# Patient Record
Sex: MALE | Race: WHITE | Employment: OTHER | URBAN - METROPOLITAN AREA
[De-identification: names, ages, dates, MRNs, and addresses within clinical notes are randomized per-mention and may not be internally consistent; named-entity substitution may affect disease eponyms.]

---

## 2014-09-03 LAB — LEFT VENTRICULAR EJECTION FRACTION, EXTERNAL: 40

## 2017-01-06 ENCOUNTER — OFFICE VISIT (OUTPATIENT)
Dept: SURGERY | Age: 62
End: 2017-01-06

## 2017-01-06 VITALS
WEIGHT: 186 LBS | OXYGEN SATURATION: 96 % | RESPIRATION RATE: 17 BRPM | BODY MASS INDEX: 31.76 KG/M2 | HEIGHT: 64 IN | HEART RATE: 82 BPM | TEMPERATURE: 98.5 F

## 2017-01-06 DIAGNOSIS — Z12.11 COLON CANCER SCREENING: ICD-10-CM

## 2017-01-06 DIAGNOSIS — Z01.818 PRE-OP TESTING: Primary | ICD-10-CM

## 2017-01-06 NOTE — PROGRESS NOTES
Review of Systems   Constitutional: Negative. HENT: Positive for tinnitus. Negative for congestion, ear discharge, ear pain, hearing loss, nosebleeds and sore throat. Eyes: Negative. Respiratory: Positive for cough and shortness of breath. Negative for hemoptysis, sputum production, wheezing and stridor. On excertion   Cardiovascular: Negative. Gastrointestinal: Positive for abdominal pain. Negative for blood in stool, constipation, diarrhea, heartburn, melena, nausea and vomiting. Right side abd pain occas. Genitourinary: Negative. Musculoskeletal: Negative. Skin: Positive for rash. Negative for itching. After shaving face   Neurological: Positive for dizziness. Negative for tingling, tremors, sensory change, speech change, focal weakness, seizures, loss of consciousness and headaches. Vertigo   Endo/Heme/Allergies: Negative for environmental allergies and polydipsia. Bruises/bleeds easily. Psychiatric/Behavioral: Positive for memory loss. Negative for depression, hallucinations, substance abuse and suicidal ideas. The patient is not nervous/anxious and does not have insomnia. Sleep apnea affects memory          Colon Screen    Patient: Narendra March MRN: [id number]  SSN: [id number]    YOB: 1955  Age: 64 y.o. Sex: male        Subjective:   Narendra March was referred by his PCP, Vinh Miller MD.  Patient referred for colonoscopy for   Screening colonoscopy. Patient denies rectal pain or bleeding. Abdominal surgeries as described below, specifically none. Family history as described below, specifically none. Patient has never had a colonoscopy.     Allergies   Allergen Reactions    Atorvastatin Myalgia     High dose    Peanut Diarrhea and Nausea and Vomiting    Acetaminophen Other (comments)     hallucinations    Dimethicone Rash    Percocet [Oxycodone-Acetaminophen] Other (comments)     hallucinations       Past Medical History Diagnosis Date    BPPV (benign paroxysmal positional vertigo) 5/26/2010    CAD (coronary artery disease), native coronary artery      cath (jan 2014): 40% LM, pLAD 85%, dLAD 70%, dLCx 80%, mRCA 95%. s/p CABG x 4 (LIMA->D, SVG->dLAD, Seq SVG -> RPDA & OM2)    Carotid duplex 01/13/2014     Mod 50-69% VALENTIN stenosis. Mild 5-16% LICA stenosis. Occluded right vertebral.       Carpal tunnel syndrome     Cerebrovascular disease      Moderate VALENTIN, mild LICA, occluded R vertebral (Doppler Jan 2014)    Chest pain, musculoskeletal      post CABG    Diabetes ([facility]     Dyslipidemia     H/O tobacco use, presenting hazards to health      quit 2014    Headache(784.0)      cluster    Heart attack ([facility]     Hiatal hernia     History of echocardiogram 09/03/2014     Tech difficult. EF 40-45%. Mild, diffuse hypk. Mild LVH. Indeterminate diastolic fx. Mild LAE. No significant valvular heart disease.  Hypercholesterolemia     Hypertension     Hypertensive heart disease with congestive heart failure ([address]     Internal carotid artery stenosis 5/26/2010    Obesity (BMI 30-39. 9)     S/P CABG x 4     S/P cardiac catheterization 01/10/2014     pLM 40%. pLAD 85%. dCX 80%. mRCA 95%. CABG recommended.  Sleep apnea     Stroke ([facility] 5/22/09     TIA    Systolic HF (heart failure) ([facility])      LV EF 35% (echo Jan 2014). Mean LA pressure 32 (Jan 2014)    Vertigo      Past Surgical History   Procedure Laterality Date    Hx urological       repair of spermatocele    Hx coronary artery bypass graft  1/14/14     LIMA to the D1, and GSV to the distal LAD, and anotherGSV sequentially to the PDA of the RCA and to theOM2 and ascending aortic endarterectomy, Dr. Ra Marroquin, 1/14/14.     Hx coronary stent placement        Family History   Problem Relation Age of Onset    Cancer Father      prostate    Heart Disease Father     Heart Disease Brother      age 48    Other Son      Brain damage    Heart Attack Brother Social History   Substance Use Topics    Smoking status: Former Smoker     Packs/day: 0.25     Years: 10.00     Types: Cigarettes     Quit date: 1/9/2014    Smokeless tobacco: Never Used      Comment:  less then 5 ciggs. a week    Alcohol use No      Prior to Admission medications    Medication Sig Start Date End Date Taking? Authorizing Provider   lovastatin (MEVACOR) 10 mg tablet TAKE ONE TABLET BY MOUTH IN THE EVENING 12/19/16  Yes Binh Garces NP   furosemide (LASIX) 40 mg tablet TAKE ONE TABLET BY MOUTH ONCE DAILY 9/26/16  Yes Binh Garces NP   glyBURIDE (DIABETA) 2.5 mg tablet TAKE ONE TABLET BY MOUTH ONCE DAILY BEFORE BREAKFAST 9/26/16  Yes Marlena Johnson MD   lisinopril (PRINIVIL, ZESTRIL) 5 mg tablet TAKE ONE TABLET BY MOUTH ONCE DAILY 6/16/16  Yes Julio ADAMS MD   carvedilol (COREG) 12.5 mg tablet TAKE ONE TABLET BY MOUTH TWICE DAILY WITH MEALS 5/4/16  Yes Julio ADAMS MD   KLOR-CON M10 10 mEq tablet TAKE ONE TABLET BY MOUTH ONCE DAILY 5/4/16  Yes Julio ADAMS MD   clopidogrel (PLAVIX) 75 mg tablet TAKE ONE TABLET BY MOUTH ONCE DAILY 3/14/16  Yes Julio ADAMS MD   lovastatin (MEVACOR) 10 mg tablet TAKE ONE TABLET BY MOUTH IN THE EVENING 11/12/15  Yes Julio ADAMS MD   cpap machine kit by Does Not Apply route. Overnight CPAP at 16 CWP with ramp and humidifier. Mask: Simplus FF small or mask of choice. Supplies 99 month. Please send compliance data [id number]. Diagnosis DAYANA. AHI 52 RDI 52 4/16/15  Yes Jose Zuleta MD   aspirin 81 mg chewable tablet Take 1 Tab by mouth daily. 1/23/14  Yes Akira Cheema PA-C   nitroglycerin (NITROSTAT) 0.4 mg SL tablet 1 Tab by SubLINGual route every five (5) minutes as needed for Chest Pain. 9/15/16   Marlena Johnson MD          Review of Systems:      Risks colonoscopy described- colon injury, missed lesion, anesthesia problems, bleeding       Lisa Ambrosio, WOODN  [birth year]73  9:01 AM

## 2017-01-06 NOTE — MR AVS SNAPSHOT
Visit Information Date & Time Provider Department Dept. Phone Encounter #  
 1/6/2017  9:00 AM TSS [facility] NURSE VISIT [facility] [phone] [id number] Your Appointments 1/6/2017  9:00 AM  
COLON SCREEN with TSS [facility] NURSE VISIT Melvin Coronado ([facility]) Appt Note: Dr Mel Aguero referring [address] [facility] [address] [facility] [address] [place] [address]  
  
    
 2/15/2017  8:20 AM  
Follow Up with José Luis Wiseman MD  
[facility] ([facility]) Appt Note: 1 year with an ekg; former Dr. Stas Rosas patient [place]l[facility] [place] [id number] [phone] [address] [place] [address] [address] Upcoming Health Maintenance Date Due Hepatitis C Screening 1955 FOOT EXAM Q1 12/10/1965 EYE EXAM RETINAL OR DILATED Q1 12/10/1965 COLONOSCOPY 12/10/1973 DTaP/Tdap/Td series (1 - Tdap) 12/10/1976 ZOSTER VACCINE AGE 60> 12/10/2015 HEMOGLOBIN A1C Q6M 3/13/2017 MICROALBUMIN Q1 9/13/2017 LIPID PANEL Q1 9/13/2017 Allergies as of 1/6/2017  Review Complete On: 1/6/2017 By: Nicolle [place]. Early, LPN Severity Noted Reaction Type Reactions Atorvastatin Medium 11/26/2014   Side Effect Myalgia High dose Peanut Medium 01/15/2014    Diarrhea, Nausea and Vomiting Acetaminophen  01/06/2017    Other (comments)  
 hallucinations Dimethicone  01/30/2015    Rash Percocet [Oxycodone-acetaminophen]  01/20/2014   Side Effect Other (comments)  
 hallucinations Current Immunizations  Reviewed on 5/11/2015 Name Date Influenza Vaccine 9/18/2014 Pneumococcal Polysaccharide (PPSV-23) 9/18/2014 Not reviewed this visit Vitals Pulse Temp Resp Height(growth percentile) Weight(growth percentile) SpO2 82 98.5 °F (36.9 °C) (Oral) 17 5' 4\" (1.626 m) 186 lb (84.4 kg) 96% BMI Smoking Status 31.93 kg/m2 Former Smoker BMI and BSA Data Body Mass Index Body Surface Area  
 31.93 kg/m 2 1.95 m 2 Preferred Pharmacy Pharmacy Name Phone [facility] [address] [phone] Your Updated Medication List  
  
   
This list is accurate as of: 1/6/17  8:49 AM.  Always use your most recent med list.  
  
  
  
  
 aspirin 81 mg chewable tablet Take 1 Tab by mouth daily. carvedilol 12.5 mg tablet Commonly known as:  COREG  
TAKE ONE TABLET BY MOUTH TWICE DAILY WITH MEALS  
  
 clopidogrel 75 mg Tab Commonly known as:  PLAVIX TAKE ONE TABLET BY MOUTH ONCE DAILY  
  
 cpap machine kit  
by Does Not Apply route. Overnight CPAP at 16 CWP with ramp and humidifier. Mask: Simplus FF small or mask of choice. Supplies 99 month. Please send compliance data [id number]. Diagnosis DAYANA. AHI 52 RDI 52  
  
 furosemide 40 mg tablet Commonly known as:  LASIX TAKE ONE TABLET BY MOUTH ONCE DAILY  
  
 glyBURIDE 2.5 mg tablet Commonly known as:  Fern Isha TAKE ONE TABLET BY MOUTH ONCE DAILY BEFORE BREAKFAST  
  
 KLOR-CON M10 10 mEq tablet Generic drug:  potassium chloride TAKE ONE TABLET BY MOUTH ONCE DAILY  
  
 lisinopril 5 mg tablet Commonly known as:  PRINIVIL, ZESTRIL  
TAKE ONE TABLET BY MOUTH ONCE DAILY * lovastatin 10 mg tablet Commonly known as:  MEVACOR  
TAKE ONE TABLET BY MOUTH IN THE EVENING  
  
 * lovastatin 10 mg tablet Commonly known as:  MEVACOR  
TAKE ONE TABLET BY MOUTH IN THE EVENING  
  
 nitroglycerin 0.4 mg SL tablet Commonly known as:  NITROSTAT  
1 Tab by SubLINGual route every five (5) minutes as needed for Chest Pain. * Notice: This list has 2 medication(s) that are the same as other medications prescribed for you.  Read the directions carefully, and ask your doctor or other care provider to review them with you. Patient Instructions MIRALAX PREP FOR PROCEDURE Your Procedure is scheduled on        plan to arrive at [facility] 2[facility] @               am/pm. Please be advised that your arrival time is subject to change. You will receive a confirmation call the day before your procedure to confirm your EXACT arrival time .~ Please do not arrive any earlier than time given as this may prolong your wait time for your procedure ~  
**Effective immediately, all patients will need to have a ride to and from [facility], and their ride must stay while they are in the procedure** YOU MUST STOP ALL BLOOD THINNING MEDICATIONS according to the doctor that prescribed them for you. Examples: Aspirin, Coumadin, Plavix, Aggrenox, Warfarin and Effient Purchase 1 bottle of MIRALAX (238 gram bottle), generic OK Purchase DULCOLAX Laxative 5 mg tablets, NOT stool softener. Purchase two 32 oz. bottles of GATORADE. (No red or purple) Diabetics use 64 oz. of water, ok to flavor with Crystal Light. You also may 
     choose to purchase two 32 oz bottles of Powerade Zero (No red or purple) 5 days before your bowel prep eat a low fiber diet (minimize fruits, vegetables and whole grains) and stop any fiber supplements. The Day before your Procedure:  Beginning at breakfast start a clear liquid diet. This consists of clear broth, coffee or tea without milk, apple and white-grape juice. You may also have Jell-O, Popsicle and clear soft drinks. NO MILK PRODUCTS AND NOTHING RED IN COLOR. 1.  At 1 pm take four 5 mg Dulcolax tablets. 2.  At 4 pm mix half of the [facility] into 32 oz of your selected fluid (Gatorade, water/Crystal Light or Powerade Zero). Drink 8 oz every 15-20 minutes until finished.  
 
3. At 8 pm mix the second half of MIRALAX into the other 32 oz of your selected fluid (Gatorade, water/Crystal Light or Powerade Zero). Drink 8 oz every 15-20 minutes until finished. 4.  If severe nausea occurs while drinking the prep, rest for 20 minutes then restart. HAVE NOTHING BY MOUTH AFTER MIDNIGHT The Day of Your Procedure: YOU MAY SHOWER AND BRUSH YOUR TEETH. YOU MAY NOT HAVE HARD CANDY OR CHEWING GUM. DO NOT TAKE ORAL DIABETIC, INSULIN, & DIURECTIC (FLUID) MEDS THE MORNING OF YOUR PROCEDURE. YOU MAY TAKE ALL OTHER MEDS WITH A SMALL SIP OF WATER. If you have been instructed to stop a blood thinner such as Aspirin, Plavix or Coumadin, etc. and have not been approved by your primary care doctor to do so, please contact our office. Contact Luet-[phone]   or Susan Madden [phone] Introducing \[facility] [place] [facility]\"" & HEALTH SERVICES! Cecilia Doherty introduces [employer] patient portal. Now you can access parts of your medical record, email your doctor's office, and request medication refills online. 1. In your internet browser, go to [email or web address] 2. Click on the First Time User? Click Here link in the Sign In box. You will see the New Member Sign Up page. 3. Enter your [employer] Access Code exactly as it appears below. You will not need to use this code after youve completed the sign-up process. If you do not sign up before the expiration date, you must request a new code. · [employer] Access Code: [email or web address] Expires: 4/6/2017  8:32 AM 
 
4. Enter the last four digits of your Social Security Number (xxxx) and Date of Birth (mm/dd/yyyy) as indicated and click Submit. You will be taken to the next sign-up page. 5. Create a [employer] ID. This will be your [employer] login ID and cannot be changed, so think of one that is secure and easy to remember. 6. Create a [employer] password. You can change your password at any time. 7. Enter your Password Reset Question and Answer.  This can be used at a later time if you forget your password. 8. Enter your e-mail address. You will receive e-mail notification when new information is available in [address] 9. Click Sign Up. You can now view and download portions of your medical record. 10. Click the Download Summary menu link to download a portable copy of your medical information. If you have questions, please visit the Frequently Asked Questions section of the [employer] website. Remember, [employer] is NOT to be used for urgent needs. For medical emergencies, dial 911. Now available from your iPhone and Android! Please provide this summary of care documentation to your next provider. Your primary care clinician is listed as Juliana Lynn. If you have any questions after today's visit, please call [phone].

## 2017-01-06 NOTE — PATIENT INSTRUCTIONS
MIRALAX PREP FOR PROCEDURE     Your Procedure is scheduled on        plan to arrive at [facility] 2[facility] @               am/pm. Please be advised that your arrival time is subject to change. You will receive a confirmation call the day before your procedure to confirm your EXACT arrival time  .~ Please do not arrive any earlier than time given as this may prolong your wait time for your procedure ~   **Effective immediately, all patients will need to have a ride to and from [facility], and their ride must stay while they are in the procedure**    YOU MUST STOP ALL BLOOD THINNING MEDICATIONS according to the doctor that prescribed them for you. Examples: Aspirin, Coumadin, Plavix, Aggrenox, Warfarin and Effient       Purchase 1 bottle of MIRALAX (238 gram bottle), generic OK               Purchase DULCOLAX Laxative 5 mg tablets, NOT stool softener. Purchase two 32 oz. bottles of GATORADE. (No red or purple)       Diabetics use 64 oz. of water, ok to flavor with Crystal Light. You also may       choose to purchase two 32 oz bottles of Powerade Zero (No red or purple)            5 days before your bowel prep eat a low fiber diet (minimize fruits, vegetables and whole grains) and stop any fiber supplements. The Day before your Procedure:      Beginning at breakfast start a clear liquid diet. This consists of clear broth, coffee or tea without milk, apple and white-grape juice. You may also have Jell-O, Popsicle and clear soft drinks. NO MILK PRODUCTS AND NOTHING RED IN COLOR. 1.  At 1 pm take four 5 mg Dulcolax tablets. 2.  At 4 pm mix half of the Carmen Carlos into 32 oz of your selected fluid (Gatorade, water/Crystal Light or Powerade Zero). Drink 8 oz every 15-20 minutes until finished. 3. At 8 pm mix the second half of MIRALAX into the other 32 oz of your selected fluid (Gatorade, water/Crystal Light or Powerade Zero). Drink 8 oz every 15-20 minutes until finished.     4. If severe nausea occurs while drinking the prep, rest for 20 minutes then restart. HAVE NOTHING BY MOUTH AFTER MIDNIGHT                 The Day of Your Procedure:      YOU MAY SHOWER AND BRUSH YOUR TEETH. YOU MAY NOT HAVE HARD CANDY OR CHEWING GUM. DO NOT TAKE ORAL DIABETIC, INSULIN, & DIURECTIC (FLUID) MEDS THE MORNING OF YOUR PROCEDURE. YOU MAY TAKE ALL OTHER MEDS WITH A SMALL SIP OF WATER. If you have been instructed to stop a blood thinner such as Aspirin, Plavix or Coumadin, etc. and have not been approved by your primary care doctor to do so, please contact our office.                     Contact Revi-[phone]   or Rg Acharya [phone]

## 2017-03-07 RX ORDER — CLOPIDOGREL BISULFATE 75 MG/1
75 TABLET ORAL DAILY
Qty: 30 TAB | Refills: 11 | Status: SHIPPED | OUTPATIENT
Start: 2017-03-07 | End: 2017-09-07

## 2017-03-08 LAB
ALBUMIN SERPL-MCNC: 4 G/DL (ref 3.6–4.8)
ALBUMIN/GLOB SERPL: 1.4 {RATIO} (ref 1.1–2.5)
ALP SERPL-CCNC: 37 IU/L (ref 39–117)
ALT SERPL-CCNC: 15 IU/L (ref 0–44)
AMBIG ABBREV CMP14 DEFAULT,977206: NORMAL
AST SERPL-CCNC: 14 IU/L (ref 0–40)
BILIRUB SERPL-MCNC: 0.2 MG/DL (ref 0–1.2)
BUN SERPL-MCNC: 17 MG/DL (ref 8–27)
BUN/CREAT SERPL: 14 (ref 10–22)
CALCIUM SERPL-MCNC: 9 MG/DL (ref 8.6–10.2)
CHLORIDE SERPL-SCNC: 103 MMOL/L (ref 96–106)
CO2 SERPL-SCNC: 23 MMOL/L (ref 18–29)
COMMENT, 144067: NORMAL
CREAT SERPL-MCNC: 1.23 MG/DL (ref 0.76–1.27)
GLOBULIN SER CALC-MCNC: 2.8 G/DL (ref 1.5–4.5)
GLUCOSE SERPL-MCNC: 178 MG/DL (ref 65–99)
HBA1C MFR BLD: 6.5 % (ref 4.8–5.6)
HCV AB S/CO SERPL IA: <0.1 S/CO RATIO (ref 0–0.9)
POTASSIUM SERPL-SCNC: 4.2 MMOL/L (ref 3.5–5.2)
PROT SERPL-MCNC: 6.8 G/DL (ref 6–8.5)
SODIUM SERPL-SCNC: 141 MMOL/L (ref 134–144)

## 2017-03-13 RX ORDER — GLYBURIDE 2.5 MG/1
TABLET ORAL
Qty: 30 TAB | Refills: 0 | Status: SHIPPED | OUTPATIENT
Start: 2017-03-13 | End: 2017-05-11 | Stop reason: SDUPTHER

## 2017-03-13 NOTE — TELEPHONE ENCOUNTER
This pharmacy faxed over request for the following prescriptions to be filled:    Medication requested :   Requested Prescriptions     Pending Prescriptions Disp Refills    glyBURIDE (DIABETA) 2.5 mg tablet 30 Tab 5     PCP: Susie Encarnacion 39. or Print: [facility]  Mail order or Local pharmacy [address]    Scheduled appointment if not seen by current providers in office: LVO 9/15/2016 f/u 3/27/2017

## 2017-03-27 ENCOUNTER — OFFICE VISIT (OUTPATIENT)
Dept: FAMILY MEDICINE CLINIC | Age: 62
End: 2017-03-27

## 2017-03-27 VITALS
BODY MASS INDEX: 32.44 KG/M2 | WEIGHT: 190 LBS | SYSTOLIC BLOOD PRESSURE: 126 MMHG | TEMPERATURE: 98.1 F | RESPIRATION RATE: 16 BRPM | HEIGHT: 64 IN | OXYGEN SATURATION: 97 % | DIASTOLIC BLOOD PRESSURE: 72 MMHG | HEART RATE: 75 BPM

## 2017-03-27 DIAGNOSIS — R07.89 CHEST PAIN, MUSCULOSKELETAL: ICD-10-CM

## 2017-03-27 DIAGNOSIS — E66.9 OBESITY (BMI 30-39.9): ICD-10-CM

## 2017-03-27 DIAGNOSIS — E78.5 DYSLIPIDEMIA: ICD-10-CM

## 2017-03-27 DIAGNOSIS — Z12.5 PROSTATE CANCER SCREENING: ICD-10-CM

## 2017-03-27 DIAGNOSIS — Z98.61 CAD S/P PERCUTANEOUS CORONARY ANGIOPLASTY: ICD-10-CM

## 2017-03-27 DIAGNOSIS — Z95.1 S/P CABG X 4: ICD-10-CM

## 2017-03-27 DIAGNOSIS — G47.33 SEVERE OBSTRUCTIVE SLEEP APNEA: ICD-10-CM

## 2017-03-27 DIAGNOSIS — I11.0 HYPERTENSIVE HEART DISEASE WITH CONGESTIVE HEART FAILURE (HCC): ICD-10-CM

## 2017-03-27 DIAGNOSIS — I25.10 CAD S/P PERCUTANEOUS CORONARY ANGIOPLASTY: ICD-10-CM

## 2017-03-27 DIAGNOSIS — E11.9 CONTROLLED TYPE 2 DIABETES MELLITUS WITHOUT COMPLICATION, WITHOUT LONG-TERM CURRENT USE OF INSULIN (HCC): Primary | ICD-10-CM

## 2017-03-27 NOTE — MR AVS SNAPSHOT
Visit Information Date & Time Provider Department Dept. Phone Encounter #  
 3/27/2017 11:00 AM Doug Graf, [address] [id number] Follow-up Instructions Return in about 6 months (around 9/27/2017) for routine care and Medicare Wellness exam. Fasting labs due 3-7 days prior to appointment. Your Appointments 5/2/2017  8:20 AM  
Follow Up with Aimee Hinojosa MD  
[facility] ([facility]) Appt Note: 1 year with an ekg; former Dr. Bismark Minaya patient; 1 year with an ekg [facility] [id number] [address] [address] [phone] [address] [place] [address] [address] Upcoming Health Maintenance Date Due  
 FOOT EXAM Q1 12/10/1965 COLONOSCOPY 12/10/1973 DTaP/Tdap/Td series (1 - Tdap) 12/10/1976 ZOSTER VACCINE AGE 60> 12/10/2015 HEMOGLOBIN A1C Q6M 9/7/2017 MICROALBUMIN Q1 9/13/2017 LIPID PANEL Q1 9/13/2017 EYE EXAM RETINAL OR DILATED Q1 2/16/2018 Allergies as of 3/27/2017  Review Complete On: 3/27/2017 By: Doug Graf MD  
  
 Severity Noted Reaction Type Reactions Atorvastatin Medium 11/26/2014   Side Effect Myalgia High dose Peanut Medium 01/15/2014    Diarrhea, Nausea and Vomiting Acetaminophen  01/06/2017    Other (comments)  
 hallucinations Dimethicone  01/30/2015    Rash Percocet [Oxycodone-acetaminophen]  01/20/2014   Side Effect Other (comments)  
 hallucinations Current Immunizations  Reviewed on 3/27/2017 Name Date Influenza Vaccine 9/18/2014 Pneumococcal Polysaccharide (PPSV-23) 9/18/2014 Reviewed by Nabila Kowalski on 3/27/2017 at 11:12 AM  
You Were Diagnosed With   
  
 Codes Comments Controlled type 2 diabetes mellitus without complication, without long-term current use of insulin ([facility]    -  Primary ICD-10-CM: E11.9 ICD-9-CM: 250.00 Dyslipidemia     ICD-10-CM: E78.5 ICD-9-CM: 272.4 Hypertensive heart disease with congestive heart failure ([facility]     ICD-10-CM: I11.0 ICD-9-CM: 402.91, 428.0   
 CAD S/P percutaneous coronary angioplasty     ICD-10-CM: I25.10, Z98.61 ICD-9-CM: 414.01, V45.82 S/P CABG x 4     ICD-10-CM: Z95.1 ICD-9-CM: V45.81 Severe obstructive sleep apnea     ICD-10-CM: G47.33 
ICD-9-CM: 327.23 Obesity (BMI 30-39. 9)     ICD-10-CM: E66.9 ICD-9-CM: 278.00 Chest pain, musculoskeletal     ICD-10-CM: R07.89 ICD-9-CM: 786.59 Prostate cancer screening     ICD-10-CM: Z12.5 ICD-9-CM: V76.44 Vitals BP Pulse Temp Resp Height(growth percentile) Weight(growth percentile) 126/72 (BP 1 Location: Left arm, BP Patient Position: Sitting) 75 98.1 °F (36.7 °C) (Oral) 16 5' 4\" (1.626 m) 190 lb (86.2 kg) SpO2 BMI Smoking Status 97% 32.61 kg/m2 Former Smoker Vitals History BMI and BSA Data Body Mass Index Body Surface Area  
 32.61 kg/m 2 1.97 m 2 Preferred Pharmacy Pharmacy Name Phone [facility] [address] [place] [address], [address] [place] [address] Rd [phone] Your Updated Medication List  
  
   
This list is accurate as of: 3/27/17 11:36 AM.  Always use your most recent med list.  
  
  
  
  
 aspirin 81 mg chewable tablet Take 1 Tab by mouth daily. carvedilol 12.5 mg tablet Commonly known as:  COREG  
TAKE ONE TABLET BY MOUTH TWICE DAILY WITH MEALS  
  
 clopidogrel 75 mg Tab Commonly known as:  PLAVIX Take 1 Tab by mouth daily. cpap machine kit  
by Does Not Apply route. Overnight CPAP at 16 CWP with ramp and humidifier. Mask: Simplus FF small or mask of choice. Supplies 99 month. Please send compliance data [id number]. Diagnosis DAYANA. AHI 52 RDI 52  
  
 furosemide 40 mg tablet Commonly known as:  LASIX TAKE ONE TABLET BY MOUTH ONCE DAILY  
  
 glyBURIDE 2.5 mg tablet Commonly known as:  [place] TAKE ONE TABLET BY MOUTH ONCE DAILY BEFORE BREAKFAST KLOR-CON M10 10 mEq tablet Generic drug:  potassium chloride TAKE ONE TABLET BY MOUTH ONCE DAILY  
  
 lisinopril 5 mg tablet Commonly known as:  PRINIVIL, ZESTRIL  
TAKE ONE TABLET BY MOUTH ONCE DAILY * lovastatin 10 mg tablet Commonly known as:  MEVACOR  
TAKE ONE TABLET BY MOUTH IN THE EVENING  
  
 * lovastatin 10 mg tablet Commonly known as:  MEVACOR  
TAKE ONE TABLET BY MOUTH IN THE EVENING  
  
 nitroglycerin 0.4 mg SL tablet Commonly known as:  NITROSTAT  
1 Tab by SubLINGual route every five (5) minutes as needed for Chest Pain. * Notice: This list has 2 medication(s) that are the same as other medications prescribed for you. Read the directions carefully, and ask your doctor or other care provider to review them with you. Follow-up Instructions Return in about 6 months (around 9/27/2017) for routine care and Medicare Wellness exam. Fasting labs due 3-7 days prior to appointment. To-Do List   
 03/27/2017 Lab:  CBC W/O DIFF   
  
 03/27/2017 Lab:  HEMOGLOBIN A1C W/O EAG   
  
 03/27/2017 Lab:  LIPID PANEL   
  
 03/27/2017 Lab:  METABOLIC PANEL, COMPREHENSIVE   
  
 03/27/2017 Lab:  MICROALBUMIN, UR, RAND W/ MICROALBUMIN/CREA RATIO   
  
 03/27/2017 Lab:  PROSTATE SPECIFIC AG (PSA) Patient Instructions A Healthy Lifestyle: Care Instructions Your Care Instructions A healthy lifestyle can help you feel good, stay at a healthy weight, and have plenty of energy for both work and play. A healthy lifestyle is something you can share with your whole family. A healthy lifestyle also can lower your risk for serious health problems, such as high blood pressure, heart disease, and diabetes. You can follow a few steps listed below to improve your health and the health of your family. Follow-up care is a key part of your treatment and safety.  Be sure to make and go to all appointments, and call your doctor if you are having problems. Its also a good idea to know your test results and keep a list of the medicines you take. How can you care for yourself at home? · Do not eat too much sugar, fat, or fast foods. You can still have dessert and treats now and then. The goal is moderation. · Start small to improve your eating habits. Pay attention to portion sizes, drink less juice and soda pop, and eat more fruits and vegetables. ¨ Eat a healthy amount of food. A 3-ounce serving of meat, for example, is about the size of a deck of cards. Fill the rest of your plate with vegetables and whole grains. ¨ Limit the amount of soda and sports drinks you have every day. Drink more water when you are thirsty. ¨ Eat at least 5 servings of fruits and vegetables every day. It may seem like a lot, but it is not hard to reach this goal. A serving or helping is 1 piece of fruit, 1 cup of vegetables, or 2 cups of leafy, raw vegetables. Have an apple or some carrot sticks as an afternoon snack instead of a candy bar. Try to have fruits and/or vegetables at every meal. 
· Make exercise part of your daily routine. You may want to start with simple activities, such as walking, bicycling, or slow swimming. Try to be active 30 to 60 minutes every day. You do not need to do all 30 to 60 minutes all at once. For example, you can exercise 3 times a day for 10 or 20 minutes. Moderate exercise is safe for most people, but it is always a good idea to talk to your doctor before starting an exercise program. 
· Keep moving. Carolyne Pages the lawn, work in the garden, or [employer]. Take the stairs instead of the elevator at work. · If you smoke, quit. People who smoke have an increased risk for heart attack, stroke, cancer, and other lung illnesses. Quitting is hard, but there are ways to boost your chance of quitting tobacco for good. ¨ Use nicotine gum, patches, or lozenges. ¨ Ask your doctor about stop-smoking programs and medicines. ¨ Keep trying. In addition to reducing your risk of diseases in the future, you will notice some benefits soon after you stop using tobacco. If you have shortness of breath or asthma symptoms, they will likely get better within a few weeks after you quit. · Limit how much alcohol you drink. Moderate amounts of alcohol (up to 2 drinks a day for men, 1 drink a day for women) are okay. But drinking too much can lead to liver problems, high blood pressure, and other health problems. Family health If you have a family, there are many things you can do together to improve your health. · Eat meals together as a family as often as possible. · Eat healthy foods. This includes fruits, vegetables, lean meats and dairy, and whole grains. · Include your family in your fitness plan. Most people think of activities such as jogging or tennis as the way to fitness, but there are many ways you and your family can be more active. Anything that makes you breathe hard and gets your heart pumping is exercise. Here are some tips: 
¨ Walk to do errands or to take your child to school or the bus. ¨ Go for a family bike ride after dinner instead of watching TV. Where can you learn more? Go to http://miahWindar Photonicslucy.info/. Enter G140 in the search box to learn more about \"A Healthy Lifestyle: Care Instructions. \" Current as of: July 26, 2016 Content Version: 11.1 © 7953-8005 [employer]. Care instructions adapted under license by [employer] (which disclaims liability or warranty for this information). If you have questions about a medical condition or this instruction, always ask your healthcare professional. [place] [address] any warranty or liability for your use of this information. Introducing \[facility] [place] [facility]\"" & HEALTH SERVICES! Corrina Vazquez introduces [employer] patient portal. Now you can access parts of your medical record, email your doctor's office, and request medication refills online. 1. In your internet browser, go to [email or web address] 2. Click on the First Time User? Click Here link in the Sign In box. You will see the New Member Sign Up page. 3. Enter your Joyus Access Code exactly as it appears below. You will not need to use this code after youve completed the sign-up process. If you do not sign up before the expiration date, you must request a new code. · Joyus Access Code: [email or web address] Expires: 4/6/2017  9:32 AM 
 
4. Enter the last four digits of your Social Security Number (xxxx) and Date of Birth (mm/dd/yyyy) as indicated and click Submit. You will be taken to the next sign-up page. 5. Create a SMARTECH MFGt ID. This will be your Joyus login ID and cannot be changed, so think of one that is secure and easy to remember. 6. Create a Joyus password. You can change your password at any time. 7. Enter your Password Reset Question and Answer. This can be used at a later time if you forget your password. 8. Enter your e-mail address. You will receive e-mail notification when new information is available in [address] 9. Click Sign Up. You can now view and download portions of your medical record. 10. Click the Download Summary menu link to download a portable copy of your medical information. If you have questions, please visit the Frequently Asked Questions section of the Joyus website. Remember, Joyus is NOT to be used for urgent needs. For medical emergencies, dial 911. Now available from your iPhone and Android! Please provide this summary of care documentation to your next provider. Your primary care clinician is listed as Vanessa Garrison. If you have any questions after today's visit, please call [phone].

## 2017-03-27 NOTE — PROGRESS NOTES
1. Have you been to the ER, urgent care clinic since your last visit? Hospitalized since your last visit? No    2. Have you seen or consulted any other health care providers outside of the [address] [place] [address] since your last visit? Include any pap smears or colon screening. No     Annual eye exam: 02-  Pneumococcal vaccine: 2014  Flu vaccine: declined  Patient instructed to remove shoes:  Yes

## 2017-03-27 NOTE — PROGRESS NOTES
SUBJECTIVE  Chief Complaint   Patient presents with    CHF    Cholesterol Problem    Coronary Artery Disease    Diabetes    Hypertension      Here for routine follow-up. He has had recent labs. No complaints besides ongoing msk chest complaints since CABG. He is complaint with his glyburide. Has had a recent diabetic eye exam.  Does not report any polyuria or polydipsia. He is currently doing well from a cardiac standpoint without any cardiac chest pain or dyspnea. He has been compliant with Plavix. He is tolerating a low dose statin without myalgias. He had myalgias at higher doses. OBJECTIVE    Blood pressure 126/72, pulse 75, temperature 98.1 °F (36.7 °C), temperature source Oral, resp. rate 16, height 5' 4\" (1.626 m), weight 190 lb (86.2 kg), SpO2 97 %. General:  alert, cooperative, well appearing, in no apparent distress. Heart: Normal S1S2, RRR. Chest: Clear, no w/r/r. Skin:  No pedal lesions. Neuro:  Monofilament testing is intact. Psych: normal affect. Mood good. Oriented x 3. Judgement and insight intact. Results for orders placed or performed in visit on 05/03/15   METABOLIC PANEL, COMPREHENSIVE   Result Value Ref Range    Glucose 178 (H) 65 - 99 mg/dL    BUN 17 8 - 27 mg/dL    Creatinine 1.23 0.76 - 1.27 mg/dL    GFR est non-AA 63 >59 mL/min/1.73    GFR est AA 73 >59 mL/min/1.73    BUN/Creatinine ratio 14 10 - 22    Sodium 141 134 - 144 mmol/L    Potassium 4.2 3.5 - 5.2 mmol/L    Chloride 103 96 - 106 mmol/L    CO2 23 18 - 29 mmol/L    Calcium 9.0 8.6 - 10.2 mg/dL    Protein, total 6.8 6.0 - 8.5 g/dL    Albumin 4.0 3.6 - 4.8 g/dL    GLOBULIN, TOTAL 2.8 1.5 - 4.5 g/dL    A-G Ratio 1.4 1.1 - 2.5    Bilirubin, total 0.2 0.0 - 1.2 mg/dL    Alk.  phosphatase 37 (L) 39 - 117 IU/L    AST (SGOT) 14 0 - 40 IU/L    ALT (SGPT) 15 0 - 44 IU/L   HEMOGLOBIN A1C W/O EAG   Result Value Ref Range    Hemoglobin A1c 6.5 (H) 4.8 - 5.6 %   HCV AB W/REFLEX VERIFICATION   Result Value Ref Range    HCV Ab <0.1 0.0 - 0.9 s/co ratio   COMMENT   Result Value Ref Range    Comment Comment    ALEXANDRE LEGGETT CMP14 DEFAULT   Result Value Ref Range    Alexandre leggett CMP14 default Comment      ASSESSMENT / PLAN    ICD-10-CM ICD-9-CM    1. Controlled type 2 diabetes mellitus without complication, without long-term current use of insulin (HCC) E11.9 250.00 CBC W/O DIFF      METABOLIC PANEL, COMPREHENSIVE      LIPID PANEL      HEMOGLOBIN A1C W/O EAG   2. Hypertensive heart disease with congestive heart failure (HCC) I11.0 402.91 CBC W/O DIFF     889.1 METABOLIC PANEL, COMPREHENSIVE      LIPID PANEL   3. CAD S/P percutaneous coronary angioplasty I25.10 414.01     Z98.61 V45.82    4. S/P CABG x 4 Z95.1 V45.81    5. Severe obstructive sleep apnea G47.33 327.23    6. Obesity (BMI 30-39. 9) E66.9 278.00    7. Dyslipidemia E78.5 272.4    8. Prostate cancer screening Z12.5 V76.44 PROSTATE SPECIFIC AG (PSA)     Reviewed labs. Diabetes -  I have counseled him on diet lower in carbs. Continue glyburide. CAD / hypertensive heart disease / dyslipidemia - under care of cardiology. Continue current care. Refills of meds as needed. Obesity - Diabetic dieting. Sleep apnea - he is on CPAP. He is scheduled for colonoscopy June 1st he says. 25 minutes of face to face time spent with the patient with at least 50% on counseling on above medical issues with respect to med compliance, screening tests, and prevention. All chart history elements were reviewed by me at the time of the visit even though marked at time of note closure. Patient understands our medical plan. Patient has provided input and agrees with goals. Alternatives have been explained and offered. All questions answered. The patient is to call if condition worsens or fails to improve.      Follow-up Disposition:  Return in about 6 months (around 9/27/2017) for routine care and Medicare Wellness exam. Fasting labs due 3-7 days prior to appointment.

## 2017-03-27 NOTE — PATIENT INSTRUCTIONS

## 2017-03-29 ENCOUNTER — TELEPHONE (OUTPATIENT)
Dept: FAMILY MEDICINE CLINIC | Age: 62
End: 2017-03-29

## 2017-03-29 NOTE — TELEPHONE ENCOUNTER
Fax received from [facility]. Per document patient is on Glyburide 2.5 mg and it is not on the formulary for [employer]. Patient was dispensed a temporary supply of the medication since he is within the first 90 days of his 2017 coverage. Prior authorization has been initiated for Glyburide via covermymeds. Questionnaire submitted. [place] Case [id number]. Status is pending review. If coverage is denied formulary medications are Glimepiride, Glipizide, and Glipizide ER.

## 2017-04-04 NOTE — TELEPHONE ENCOUNTER
I spoke to pharmacist at [facility]. This is a $4 generic. They ran it incorrectly. He should be able to get this for $4 per month.

## 2017-05-01 ENCOUNTER — TELEPHONE (OUTPATIENT)
Dept: FAMILY MEDICINE CLINIC | Age: 62
End: 2017-05-01

## 2017-05-01 NOTE — TELEPHONE ENCOUNTER
[facility] referral submitted. Authorization # [id number] valid 5/1/16-5/1/17 for 99 visits.

## 2017-05-01 NOTE — TELEPHONE ENCOUNTER
Patient is requesting (New  Updated) referral to the following office:    Speciality: Cardiovascular   Specialist Name: DR Emelyn Diaz   Office Location: [address] [place] [address]  Phone (if available): [phone]  Fax (if available): [phone](ZQNJ dial Area Code   Diagnosis: I 10  Date of appointment (if scheduled): 5/2/2017 [facility]

## 2017-05-02 ENCOUNTER — OFFICE VISIT (OUTPATIENT)
Dept: CARDIOLOGY CLINIC | Age: 62
End: 2017-05-02

## 2017-05-02 VITALS
HEART RATE: 80 BPM | SYSTOLIC BLOOD PRESSURE: 122 MMHG | HEIGHT: 64 IN | OXYGEN SATURATION: 94 % | WEIGHT: 189 LBS | BODY MASS INDEX: 32.27 KG/M2 | DIASTOLIC BLOOD PRESSURE: 60 MMHG

## 2017-05-02 DIAGNOSIS — I11.0 HYPERTENSIVE HEART DISEASE WITH CONGESTIVE HEART FAILURE (HCC): ICD-10-CM

## 2017-05-02 DIAGNOSIS — Z95.1 S/P CABG X 4: ICD-10-CM

## 2017-05-02 DIAGNOSIS — Z98.61 CAD S/P PERCUTANEOUS CORONARY ANGIOPLASTY: Primary | ICD-10-CM

## 2017-05-02 DIAGNOSIS — E78.00 HYPERCHOLESTEROLEMIA: ICD-10-CM

## 2017-05-02 DIAGNOSIS — I25.10 CAD S/P PERCUTANEOUS CORONARY ANGIOPLASTY: Primary | ICD-10-CM

## 2017-05-02 DIAGNOSIS — E78.5 DYSLIPIDEMIA: ICD-10-CM

## 2017-05-02 RX ORDER — ROSUVASTATIN CALCIUM 10 MG/1
10 TABLET, COATED ORAL
Qty: 30 TAB | Refills: 6 | Status: SHIPPED | OUTPATIENT
Start: 2017-05-02 | End: 2017-11-25 | Stop reason: SDUPTHER

## 2017-05-02 NOTE — PROGRESS NOTES
1. Have you been to the ER, urgent care clinic since your last visit? Hospitalized since your last visit? no  2. Have you seen or consulted any other health care providers outside of the [address] [place] [address] since your last visit? Include any pap smears or colon screening.  no

## 2017-05-02 NOTE — PROGRESS NOTES
HISTORY OF PRESENT ILLNESS  Gurjit Barillas is a 64 y.o. male. HPI  This is a former patient of Dr. Julian Wong who comes in for his first followup visit with me. He has been doing quite well. He has remained asymptomatic. He has had no chest pain, dyspnea, orthopnea or PND. He denies palpitations, dizziness or syncope. He has had no symptoms to indicate TIA or amaurosis fugax. He has had no claudication. He has not smoked cigarettes since 2014. He has history of known coronary artery disease. He underwent CABG X four in January of 2014, which consisted of:    1. LIMA to the proximal LAD               2. Single SVG to the distal LAD               3. Sequential SVG to the RPDA and OM2. He was readmitted with a non-STEMI myocardial infarction in January of 2015 and underwent cardiac catheterization, which demonstrated:    1. Patent LIMA to the LAD                2. Total occlusion of the SVG to the LAD                3. 95% ostial stenosis of the sequential SVG to the RPDA and obtuse marginal branch 2 with 70% OM just after the anastomosis. He subsequently underwent successful stenting of the vein graft to the RPDA and OM along with OM branch with a Xience drug-eluting stent. He has history of hypertension, diabetes mellitus, and dyslipidemia. He does have history of metabolic syndrome with high triglycerides and low HDL along with diabetes and hypertension. His last echocardiogram in March of 2014 demonstrated mild to moderate LV dysfunction with EF in the 40-45% range. There was no significant valvular dysfunction. There was no evidence of significant pulmonary hypertension. Review of Systems   Constitutional: Negative for malaise/fatigue and weight loss. HENT: Negative for hearing loss. Eyes: Negative for blurred vision and double vision. Respiratory: Negative for shortness of breath.     Cardiovascular: Negative for chest pain, palpitations, orthopnea, claudication, leg swelling and PND. Gastrointestinal: Negative for blood in stool, heartburn and melena. Genitourinary: Negative for dysuria, frequency, hematuria and urgency. Musculoskeletal: Negative for back pain and joint pain. Skin: Negative for itching and rash. Neurological: Negative for dizziness, loss of consciousness, weakness and headaches. Psychiatric/Behavioral: Negative for depression and memory loss. Physical Exam   Constitutional: He is oriented to person, place, and time. He appears well-developed and well-nourished. HENT:   Head: Normocephalic and atraumatic. Eyes: Conjunctivae are normal. Pupils are equal, round, and reactive to light. Neck: Normal range of motion. Neck supple. No JVD present. Cardiovascular: Normal rate, regular rhythm, S1 normal and S2 normal.   No extrasystoles are present. PMI is not displaced. Exam reveals no gallop and no friction rub. No murmur heard. Pulses:       Carotid pulses are 3+ on the right side, and 3+ on the left side. Pulmonary/Chest: Effort normal. He has no rales. Abdominal: Soft. There is no tenderness. Musculoskeletal: He exhibits no edema. Neurological: He is alert and oriented to person, place, and time. No cranial nerve deficit. Skin: Skin is warm and dry. Psychiatric: He has a normal mood and affect. His behavior is normal.     Visit Vitals    /60 (BP 1 Location: Left arm, BP Patient Position: Sitting)    Pulse 80    Ht 5' 4\" (1.626 m)    Wt 85.7 kg (189 lb)    SpO2 94%    BMI 32.44 kg/m2       Past Medical History:   Diagnosis Date    BPPV (benign paroxysmal positional vertigo) 5/26/2010    CABG x 4     CAD (coronary artery disease), native coronary artery     cath (jan 2014): 40% LM, pLAD 85%, dLAD 70%, dLCx 80%, mRCA 95%. s/p CABG x 4 (LIMA->D, SVG->dLAD, Seq SVG -> RPDA & OM2)    Cardiac catheterization 01/10/2014    pLM 40%. pLAD 85%. dCX 80%. mRCA 95%. CABG recommended.     Cardiac echocardiogram 09/03/2014    Tech difficult. EF 40-45%. Mild, diffuse hypk. Mild LVH. Indeterminate diastolic fx. Mild LAE. No significant valvular heart disease.  Carotid duplex 01/13/2014    Mod 50-69% VALENTIN stenosis. Mild 2-94% LICA stenosis. Occluded right vertebral.       Carpal tunnel syndrome     Cerebrovascular disease     Moderate VALENTIN, mild LICA, occluded R vertebral (Doppler Jan 2014)    Chest pain, musculoskeletal     post CABG    Diabetes ([address]     Dyslipidemia     H/O tobacco use, presenting hazards to health     quit 2014    Headache     cluster    Heart attack ([address]     Hiatal hernia     Hypercholesterolemia     Hypertension     Hypertensive heart disease with congestive heart failure ([address]     Internal carotid artery stenosis 5/26/2010    Obesity (BMI 30-39. 9)     Sleep apnea     Stroke [facility] 5/22/09    TIA    Systolic HF [facility]heart failure) ([facility])     LV EF 35% (echo Jan 2014). Mean LA pressure 32 (Jan 2014)    Vertigo        Social History     Social History    Marital status:      Spouse name: N/A    Number of children: N/A    Years of education: N/A     Occupational History    retired      Social History Main Topics    Smoking status: Former Smoker     Packs/day: 0.25     Years: 10.00     Types: Cigarettes     Quit date: 1/9/2014    Smokeless tobacco: Never Used      Comment:  less then 5 ciggs.  a week    Alcohol use No    Drug use: No    Sexual activity: Not Currently     Partners: Female     Other Topics Concern    Not on file     Social History Narrative       Family History   Problem Relation Age of Onset    Cancer Father      prostate    Heart Disease Father     Heart Disease Brother      age 48   [facility] Other Son      Brain damage    Heart Attack Brother        Past Surgical History:   Procedure Laterality Date    HX CORONARY ARTERY BYPASS GRAFT  1/14/14    LIMA to the D1, and GSV to the distal LAD, and anotherGSV sequentially to the PDA of the RCA and to theOM2 and ascending aortic endarterectomy, Dr. Dee Dee Webster, 1/14/14.  HX CORONARY STENT PLACEMENT      HX UROLOGICAL      repair of spermatocele       Current Outpatient Prescriptions   Medication Sig Dispense Refill    glyBURIDE (DIABETA) 2.5 mg tablet TAKE ONE TABLET BY MOUTH ONCE DAILY BEFORE BREAKFAST 30 Tab 0    clopidogrel (PLAVIX) 75 mg tab Take 1 Tab by mouth daily. 30 Tab 11    lovastatin (MEVACOR) 10 mg tablet TAKE ONE TABLET BY MOUTH IN THE EVENING 30 Tab 11    furosemide (LASIX) 40 mg tablet TAKE ONE TABLET BY MOUTH ONCE DAILY 30 Tab 11    nitroglycerin (NITROSTAT) 0.4 mg SL tablet 1 Tab by SubLINGual route every five (5) minutes as needed for Chest Pain. 1 Bottle 0    lisinopril (PRINIVIL, ZESTRIL) 5 mg tablet TAKE ONE TABLET BY MOUTH ONCE DAILY 30 Tab 11    carvedilol (COREG) 12.5 mg tablet TAKE ONE TABLET BY MOUTH TWICE DAILY WITH MEALS 60 Tab 11    KLOR-CON M10 10 mEq tablet TAKE ONE TABLET BY MOUTH ONCE DAILY 30 Tab 11    lovastatin (MEVACOR) 10 mg tablet TAKE ONE TABLET BY MOUTH IN THE EVENING 30 Tab 11    cpap machine kit by Does Not Apply route. Overnight CPAP at 16 CWP with ramp and humidifier. Mask: Simplus FF small or mask of choice. Supplies 99 month. Please send compliance data [id number]. Diagnosis DAYANA. AHI 52 RDI 52 1 Kit 0    aspirin 81 mg chewable tablet Take 1 Tab by mouth daily. 30 Tab 3       EKG: unchanged from previous tracings, normal sinus rhythm, nonspecific ST and T waves changes. ASSESSMENT and PLAN  Encounter Diagnoses   Name Primary?  CAD S/P stent to Seq. SVG to rPDA-OM2 Jan.2015,EF 40-45% Yes    Hypercholesterolemia     S/P CABG x 4,2014     Dyslipidemia     Hypertensive heart disease with congestive heart failure ([facility] [address]    He has been doing well. He has had no symptoms to indicate angina or cardiac decompensation. His blood pressure has been under control.   We went over his coronary anatomy and his bypass surgery, as well as the stenting of the vein graft using the diagram.  The patient did not have a good understanding of his coronary anatomy or his bypass surgery. He now seems to have a good understanding. We discussed his metabolic syndrome with low HDL, high triglycerides, diabetes, and hypertension. His cholesterol profile is not quite satisfactory and, at this point, I would replace the low-dose Mevacor with Crestor. He was given strong advice to go on a diet and have an exercise protocol. I spent over forty minutes with the patient in face-to-face consultation of which greater than fifty percent was spent in counseling and discussion.

## 2017-05-02 NOTE — MR AVS SNAPSHOT
Visit Information Date & Time Provider Department Dept. Phone Encounter #  
 5/2/2017  8:20 Chai Celaya MD [facility] [phone] [id number] Upcoming Health Maintenance Date Due  
 FOOT EXAM Q1 12/10/1965 COLONOSCOPY 12/10/1973 DTaP/Tdap/Td series (1 - Tdap) 12/10/1976 ZOSTER VACCINE AGE 60> 12/10/2015 INFLUENZA AGE 9 TO ADULT 8/1/2017 HEMOGLOBIN A1C Q6M 9/7/2017 MICROALBUMIN Q1 9/13/2017 LIPID PANEL Q1 9/13/2017 EYE EXAM RETINAL OR DILATED Q1 2/16/2018 Allergies as of 5/2/2017  Review Complete On: 5/2/2017 By: Yanni Daly MD  
  
 Severity Noted Reaction Type Reactions Atorvastatin Medium 11/26/2014   Side Effect Myalgia High dose Peanut Medium 01/15/2014    Diarrhea, Nausea and Vomiting Acetaminophen  01/06/2017    Other (comments)  
 hallucinations Dimethicone  01/30/2015    Rash Percocet [Oxycodone-acetaminophen]  01/20/2014   Side Effect Other (comments)  
 hallucinations Current Immunizations  Reviewed on 3/27/2017 Name Date Influenza Vaccine 9/18/2014 Pneumococcal Polysaccharide (PPSV-23) 9/18/2014 Not reviewed this visit You Were Diagnosed With   
  
 Codes Comments CAD S/P percutaneous coronary angioplasty    -  Primary ICD-10-CM: I25.10, Z98.61 ICD-9-CM: 414.01, V45.82 Hypercholesterolemia     ICD-10-CM: E78.00 ICD-9-CM: 272.0 Vitals BP Pulse Height(growth percentile) Weight(growth percentile) SpO2 BMI  
 122/60 (BP 1 Location: Left arm, BP Patient Position: Sitting) 80 5' 4\" (1.626 m) 189 lb (85.7 kg) 94% 32.44 kg/m2 Smoking Status Former Smoker Vitals History BMI and BSA Data Body Mass Index Body Surface Area  
 32.44 kg/m 2 1.97 m 2 Preferred Pharmacy Pharmacy Name Phone [id number] [address] [phone] Your Updated Medication List  
  
   
 This list is accurate as of: 5/2/17  9:49 AM.  Always use your most recent med list.  
  
  
  
  
 aspirin 81 mg chewable tablet Take 1 Tab by mouth daily. carvedilol 12.5 mg tablet Commonly known as:  COREG  
TAKE ONE TABLET BY MOUTH TWICE DAILY WITH MEALS  
  
 clopidogrel 75 mg Tab Commonly known as:  PLAVIX Take 1 Tab by mouth daily. cpap machine kit  
by Does Not Apply route. Overnight CPAP at 16 CWP with ramp and humidifier. Mask: Simplus FF small or mask of choice. Supplies 99 month. Please send compliance data [id number]. Diagnosis DAYANA. AHI 52 RDI 52  
  
 furosemide 40 mg tablet Commonly known as:  LASIX TAKE ONE TABLET BY MOUTH ONCE DAILY  
  
 glyBURIDE 2.5 mg tablet Commonly known as:  Micki Светлана TAKE ONE TABLET BY MOUTH ONCE DAILY BEFORE BREAKFAST  
  
 KLOR-CON M10 10 mEq tablet Generic drug:  potassium chloride TAKE ONE TABLET BY MOUTH ONCE DAILY  
  
 lisinopril 5 mg tablet Commonly known as:  PRINIVIL, ZESTRIL  
TAKE ONE TABLET BY MOUTH ONCE DAILY lovastatin 10 mg tablet Commonly known as:  MEVACOR  
TAKE ONE TABLET BY MOUTH IN THE EVENING  
  
 nitroglycerin 0.4 mg SL tablet Commonly known as:  NITROSTAT  
1 Tab by SubLINGual route every five (5) minutes as needed for Chest Pain. We Performed the Following AMB POC EKG ROUTINE W/ 12 LEADS, INTER & REP [80093 CPT(R)] To-Do List   
 05/02/2017 ECG:  CARDIAC SPECT REST AND STRESS   
  
 05/02/2017 ECG:  NUCLEAR STRESS TEST   
  
 08/02/2017 Lab:  HEPATIC FUNCTION PANEL   
  
 08/02/2017 Lab:  LIPID PANEL   
  
 11/02/2017 8:30 AM  
  Appointment with [facility] NUC CARD ROOM at [facility] NON-INVASIVE CARD ([phone]) This is a 2-part test which takes approximately 4 hours to complete. Please see part 2 of exam below for full instructions 11/02/2017 9:00 AM  
  Appointment with [facility] NUC CARD ROOM at [facility] NON-INVASIVE [facility] ([phone]) 1-No eating or coffee after midnight  2-Do not take diabetic meds (bring with) 3-Please take all other meds unless specified by cardiology Patient Instructions Stop Lovastatin Start Crestor 10 mg one tab daily Introducing [facility] SERVICES! [employer] introduces Gnodal patient portal. Now you can access parts of your medical record, email your doctor's office, and request medication refills online. 1. In your internet browser, go to [email or web address] 2. Click on the First Time User? Click Here link in the Sign In box. You will see the New Member Sign Up page. 3. Enter your Gnodal Access Code exactly as it appears below. You will not need to use this code after youve completed the sign-up process. If you do not sign up before the expiration date, you must request a new code. · Gnodal Access Code: [email or web address] Expires: 7/31/2017  8:28 AM 
 
4. Enter the last four digits of your Social Security Number (xxxx) and Date of Birth (mm/dd/yyyy) as indicated and click Submit. You will be taken to the next sign-up page. 5. Create a Gnodal ID. This will be your Gnodal login ID and cannot be changed, so think of one that is secure and easy to remember. 6. Create a Gnodal password. You can change your password at any time. 7. Enter your Password Reset Question and Answer. This can be used at a later time if you forget your password. 8. Enter your e-mail address. You will receive e-mail notification when new information is available in [address] 9. Click Sign Up. You can now view and download portions of your medical record. 10. Click the Download Summary menu link to download a portable copy of your medical information. If you have questions, please visit the Frequently Asked Questions section of the Gnodal website. Remember, Gnodal is NOT to be used for urgent needs. For medical emergencies, dial 911. Now available from your iPhone and Android! Please provide this summary of care documentation to your next provider. Your primary care clinician is listed as Franko Britt. If you have any questions after today's visit, please call [phone].

## 2017-05-15 ENCOUNTER — TELEPHONE (OUTPATIENT)
Dept: FAMILY MEDICINE CLINIC | Age: 62
End: 2017-05-15

## 2017-05-15 NOTE — TELEPHONE ENCOUNTER
Spoke with Stanton Amaral he was informed that per Dr. Elver Lozano he would rather him take tylenol vs motrin. Stanton Amaral voice complete understanding.

## 2017-05-15 NOTE — TELEPHONE ENCOUNTER
Patient states he currently has back pain and wants to know if he can take ibuprofen 800 mg.  Please call patient back per request.

## 2017-05-26 RX ORDER — CARVEDILOL 12.5 MG/1
TABLET ORAL
Qty: 60 TAB | Refills: 11 | Status: SHIPPED | OUTPATIENT
Start: 2017-05-26 | End: 2018-05-07 | Stop reason: DRUGHIGH

## 2017-06-09 RX ORDER — POTASSIUM CHLORIDE 750 MG/1
TABLET, EXTENDED RELEASE ORAL
Qty: 30 TAB | Refills: 11 | Status: SHIPPED | OUTPATIENT
Start: 2017-06-09 | End: 2017-10-02 | Stop reason: SDUPTHER

## 2017-07-18 RX ORDER — LISINOPRIL 5 MG/1
TABLET ORAL
Qty: 30 TAB | Refills: 11 | Status: SHIPPED | OUTPATIENT
Start: 2017-07-18 | End: 2018-02-27 | Stop reason: SDUPTHER

## 2017-08-07 ENCOUNTER — TELEPHONE (OUTPATIENT)
Dept: FAMILY MEDICINE CLINIC | Age: 62
End: 2017-08-07

## 2017-08-07 NOTE — TELEPHONE ENCOUNTER
Pt would like to know if he could do the fit kit first before having a colonoscopy. He was told by a friend that it was easier. Please advise.

## 2017-08-08 NOTE — TELEPHONE ENCOUNTER
Nurse to advise that the colonoscopy is much more accurate and less frequent of a test.   If no polyps are found, he can avoid testing for 10 years. The FIT test is every year and has a more common rate of error. The best test is the colonoscopy in my opinion.

## 2017-08-08 NOTE — TELEPHONE ENCOUNTER
Spoke with patient. He was advised that per Dr. Richie Baumgarten, the colonoscopy is much more accurate and less frequent of a test.   If no polyps are found, he can avoid testing for 10 years. The FIT test is every year and has a more common rate of error. The best test is the colonoscopy in Dr. Glez School opinion. He voices understanding.

## 2017-08-17 LAB
ALBUMIN SERPL-MCNC: 4.3 G/DL (ref 3.6–4.8)
ALBUMIN SERPL-MCNC: 4.5 G/DL (ref 3.6–4.8)
ALBUMIN/CREAT UR: 4.1 MG/G CREAT (ref 0–30)
ALBUMIN/GLOB SERPL: 1.8 {RATIO} (ref 1.2–2.2)
ALP SERPL-CCNC: 37 IU/L (ref 39–117)
ALP SERPL-CCNC: 38 IU/L (ref 39–117)
ALT SERPL-CCNC: 12 IU/L (ref 0–44)
ALT SERPL-CCNC: 15 IU/L (ref 0–44)
AST SERPL-CCNC: 14 IU/L (ref 0–40)
AST SERPL-CCNC: 14 IU/L (ref 0–40)
BILIRUB DIRECT SERPL-MCNC: 0.11 MG/DL (ref 0–0.4)
BILIRUB SERPL-MCNC: 0.4 MG/DL (ref 0–1.2)
BILIRUB SERPL-MCNC: 0.4 MG/DL (ref 0–1.2)
BUN SERPL-MCNC: 21 MG/DL (ref 8–27)
BUN/CREAT SERPL: 18 (ref 10–24)
CALCIUM SERPL-MCNC: 9.2 MG/DL (ref 8.6–10.2)
CHLORIDE SERPL-SCNC: 101 MMOL/L (ref 96–106)
CHOLEST SERPL-MCNC: 166 MG/DL (ref 100–199)
CO2 SERPL-SCNC: 22 MMOL/L (ref 18–29)
CREAT SERPL-MCNC: 1.19 MG/DL (ref 0.76–1.27)
CREAT UR-MCNC: 157.2 MG/DL
ERYTHROCYTE [DISTWIDTH] IN BLOOD BY AUTOMATED COUNT: 14.8 % (ref 12.3–15.4)
GLOBULIN SER CALC-MCNC: 2.5 G/DL (ref 1.5–4.5)
GLUCOSE SERPL-MCNC: 102 MG/DL (ref 65–99)
HBA1C MFR BLD: 6.2 % (ref 4.8–5.6)
HCT VFR BLD AUTO: 41.1 % (ref 37.5–51)
HDLC SERPL-MCNC: 31 MG/DL
HGB BLD-MCNC: 13.4 G/DL (ref 12.6–17.7)
INTERPRETATION, 910389: NORMAL
LDLC SERPL CALC-MCNC: 88 MG/DL (ref 0–99)
Lab: NORMAL
MCH RBC QN AUTO: 27.4 PG (ref 26.6–33)
MCHC RBC AUTO-ENTMCNC: 32.6 G/DL (ref 31.5–35.7)
MCV RBC AUTO: 84 FL (ref 79–97)
MICROALBUMIN UR-MCNC: 6.4 UG/ML
PLATELET # BLD AUTO: 207 X10E3/UL (ref 150–379)
POTASSIUM SERPL-SCNC: 4.6 MMOL/L (ref 3.5–5.2)
PROT SERPL-MCNC: 7 G/DL (ref 6–8.5)
PROT SERPL-MCNC: 7.2 G/DL (ref 6–8.5)
PSA SERPL-MCNC: 1.6 NG/ML (ref 0–4)
RBC # BLD AUTO: 4.89 X10E6/UL (ref 4.14–5.8)
SODIUM SERPL-SCNC: 141 MMOL/L (ref 134–144)
TRIGL SERPL-MCNC: 234 MG/DL (ref 0–149)
VLDLC SERPL CALC-MCNC: 47 MG/DL (ref 5–40)
WBC # BLD AUTO: 8.6 X10E3/UL (ref 3.4–10.8)

## 2017-09-05 DIAGNOSIS — Z01.818 PRE-OP TESTING: Primary | ICD-10-CM

## 2017-09-06 ENCOUNTER — HOSPITAL ENCOUNTER (OUTPATIENT)
Dept: PREADMISSION TESTING | Age: 62
Discharge: HOME | End: 2017-09-06
Payer: MEDICARE

## 2017-09-06 ENCOUNTER — ANESTHESIA EVENT (OUTPATIENT)
Dept: ENDOSCOPY | Age: 62
End: 2017-09-06
Payer: MEDICARE

## 2017-09-06 DIAGNOSIS — Z01.818 PRE-OP TESTING: ICD-10-CM

## 2017-09-06 LAB
ANION GAP SERPL CALC-SCNC: 10 MMOL/L (ref 3–18)
BUN SERPL-MCNC: 20 MG/DL (ref 7–18)
BUN/CREAT SERPL: 14 (ref 12–20)
CALCIUM SERPL-MCNC: 9.3 MG/DL (ref 8.5–10.1)
CHLORIDE SERPL-SCNC: 103 MMOL/L (ref 100–108)
CO2 SERPL-SCNC: 25 MMOL/L (ref 21–32)
CREAT SERPL-MCNC: 1.46 MG/DL (ref 0.6–1.3)
GLUCOSE SERPL-MCNC: 132 MG/DL (ref 74–99)
POTASSIUM SERPL-SCNC: 4.2 MMOL/L (ref 3.5–5.5)
SODIUM SERPL-SCNC: 138 MMOL/L (ref 136–145)

## 2017-09-06 PROCEDURE — 36415 COLL VENOUS BLD VENIPUNCTURE: CPT | Performed by: COLON & RECTAL SURGERY

## 2017-09-06 PROCEDURE — 80048 BASIC METABOLIC PNL TOTAL CA: CPT | Performed by: COLON & RECTAL SURGERY

## 2017-09-07 ENCOUNTER — HOSPITAL ENCOUNTER (OUTPATIENT)
Age: 62
Setting detail: OUTPATIENT SURGERY
Discharge: HOME | End: 2017-09-07
Attending: COLON & RECTAL SURGERY | Admitting: COLON & RECTAL SURGERY
Payer: MEDICARE

## 2017-09-07 ENCOUNTER — ANESTHESIA (OUTPATIENT)
Dept: ENDOSCOPY | Age: 62
End: 2017-09-07
Payer: MEDICARE

## 2017-09-07 VITALS
HEART RATE: 63 BPM | DIASTOLIC BLOOD PRESSURE: 78 MMHG | BODY MASS INDEX: 32.07 KG/M2 | OXYGEN SATURATION: 98 % | TEMPERATURE: 96.6 F | RESPIRATION RATE: 14 BRPM | WEIGHT: 181 LBS | SYSTOLIC BLOOD PRESSURE: 133 MMHG | HEIGHT: 63 IN

## 2017-09-07 LAB
GLUCOSE BLD STRIP.AUTO-MCNC: 101 MG/DL (ref 70–110)
GLUCOSE BLD STRIP.AUTO-MCNC: 113 MG/DL (ref 70–110)

## 2017-09-07 PROCEDURE — 74011250636 HC RX REV CODE- 250/636: Performed by: NURSE ANESTHETIST, CERTIFIED REGISTERED

## 2017-09-07 PROCEDURE — 77030009426 HC FCPS BIOP ENDOSC BSC -B: Performed by: COLON & RECTAL SURGERY

## 2017-09-07 PROCEDURE — 77030018846 HC SOL IRR STRL H20 ICUM -A: Performed by: COLON & RECTAL SURGERY

## 2017-09-07 PROCEDURE — 77030013992 HC SNR POLYP ENDOSC BSC -B: Performed by: COLON & RECTAL SURGERY

## 2017-09-07 PROCEDURE — 77030020508 HC PD GRND GENRTR BAYL -A: Performed by: COLON & RECTAL SURGERY

## 2017-09-07 PROCEDURE — C1729 CATH, DRAINAGE: HCPCS | Performed by: COLON & RECTAL SURGERY

## 2017-09-07 PROCEDURE — 76040000019: Performed by: COLON & RECTAL SURGERY

## 2017-09-07 PROCEDURE — 76060000031 HC ANESTHESIA FIRST 0.5 HR: Performed by: COLON & RECTAL SURGERY

## 2017-09-07 PROCEDURE — 74011250636 HC RX REV CODE- 250/636

## 2017-09-07 PROCEDURE — 82962 GLUCOSE BLOOD TEST: CPT

## 2017-09-07 PROCEDURE — 74011000250 HC RX REV CODE- 250: Performed by: NURSE ANESTHETIST, CERTIFIED REGISTERED

## 2017-09-07 PROCEDURE — 77030008565 HC TBNG SUC IRR ERBE -B: Performed by: COLON & RECTAL SURGERY

## 2017-09-07 PROCEDURE — 88305 TISSUE EXAM BY PATHOLOGIST: CPT | Performed by: COLON & RECTAL SURGERY

## 2017-09-07 RX ORDER — FENTANYL CITRATE 50 UG/ML
50 INJECTION, SOLUTION INTRAMUSCULAR; INTRAVENOUS
Status: CANCELLED | OUTPATIENT
Start: 2017-09-07

## 2017-09-07 RX ORDER — NALOXONE HYDROCHLORIDE 0.4 MG/ML
0.04 INJECTION, SOLUTION INTRAMUSCULAR; INTRAVENOUS; SUBCUTANEOUS AS NEEDED
Status: CANCELLED | OUTPATIENT
Start: 2017-09-07

## 2017-09-07 RX ORDER — SODIUM CHLORIDE 0.9 % (FLUSH) 0.9 %
5-10 SYRINGE (ML) INJECTION AS NEEDED
Status: CANCELLED | OUTPATIENT
Start: 2017-09-07

## 2017-09-07 RX ORDER — SODIUM CHLORIDE, SODIUM LACTATE, POTASSIUM CHLORIDE, CALCIUM CHLORIDE 600; 310; 30; 20 MG/100ML; MG/100ML; MG/100ML; MG/100ML
75 INJECTION, SOLUTION INTRAVENOUS CONTINUOUS
Status: DISCONTINUED | OUTPATIENT
Start: 2017-09-07 | End: 2017-09-07 | Stop reason: HOSPADM

## 2017-09-07 RX ORDER — DEXTROSE 50 % IN WATER (D50W) INTRAVENOUS SYRINGE
25-50 AS NEEDED
Status: CANCELLED | OUTPATIENT
Start: 2017-09-07

## 2017-09-07 RX ORDER — MAGNESIUM SULFATE 100 %
4 CRYSTALS MISCELLANEOUS AS NEEDED
Status: CANCELLED | OUTPATIENT
Start: 2017-09-07

## 2017-09-07 RX ORDER — INSULIN LISPRO 100 [IU]/ML
INJECTION, SOLUTION INTRAVENOUS; SUBCUTANEOUS ONCE
Status: CANCELLED | OUTPATIENT
Start: 2017-09-07 | End: 2017-09-07

## 2017-09-07 RX ORDER — ONDANSETRON 2 MG/ML
4 INJECTION INTRAMUSCULAR; INTRAVENOUS ONCE
Status: CANCELLED | OUTPATIENT
Start: 2017-09-07 | End: 2017-09-07

## 2017-09-07 RX ORDER — SODIUM CHLORIDE, SODIUM LACTATE, POTASSIUM CHLORIDE, CALCIUM CHLORIDE 600; 310; 30; 20 MG/100ML; MG/100ML; MG/100ML; MG/100ML
50 INJECTION, SOLUTION INTRAVENOUS CONTINUOUS
Status: CANCELLED | OUTPATIENT
Start: 2017-09-07

## 2017-09-07 RX ORDER — SODIUM CHLORIDE 0.9 % (FLUSH) 0.9 %
5-10 SYRINGE (ML) INJECTION EVERY 8 HOURS
Status: DISCONTINUED | OUTPATIENT
Start: 2017-09-07 | End: 2017-09-07 | Stop reason: HOSPADM

## 2017-09-07 RX ORDER — SODIUM CHLORIDE 0.9 % (FLUSH) 0.9 %
5-10 SYRINGE (ML) INJECTION AS NEEDED
Status: DISCONTINUED | OUTPATIENT
Start: 2017-09-07 | End: 2017-09-07 | Stop reason: HOSPADM

## 2017-09-07 RX ORDER — DIPHENHYDRAMINE HYDROCHLORIDE 50 MG/ML
12.5 INJECTION, SOLUTION INTRAMUSCULAR; INTRAVENOUS
Status: CANCELLED | OUTPATIENT
Start: 2017-09-07

## 2017-09-07 RX ORDER — ALBUTEROL SULFATE 0.83 MG/ML
2.5 SOLUTION RESPIRATORY (INHALATION) AS NEEDED
Status: CANCELLED | OUTPATIENT
Start: 2017-09-07

## 2017-09-07 RX ORDER — PROPOFOL 10 MG/ML
INJECTION, EMULSION INTRAVENOUS AS NEEDED
Status: DISCONTINUED | OUTPATIENT
Start: 2017-09-07 | End: 2017-09-07 | Stop reason: HOSPADM

## 2017-09-07 RX ADMIN — PROPOFOL 100 MG: 10 INJECTION, EMULSION INTRAVENOUS at 08:33

## 2017-09-07 RX ADMIN — PROPOFOL 50 MG: 10 INJECTION, EMULSION INTRAVENOUS at 08:37

## 2017-09-07 RX ADMIN — PROPOFOL 50 MG: 10 INJECTION, EMULSION INTRAVENOUS at 08:46

## 2017-09-07 RX ADMIN — FAMOTIDINE: 10 INJECTION INTRAVENOUS at 08:13

## 2017-09-07 RX ADMIN — PROPOFOL 50 MG: 10 INJECTION, EMULSION INTRAVENOUS at 08:41

## 2017-09-07 RX ADMIN — SODIUM CHLORIDE, SODIUM LACTATE, POTASSIUM CHLORIDE, AND CALCIUM CHLORIDE 75 ML/HR: 600; 310; 30; 20 INJECTION, SOLUTION INTRAVENOUS at 08:12

## 2017-09-07 RX ADMIN — SODIUM CHLORIDE, SODIUM LACTATE, POTASSIUM CHLORIDE, AND CALCIUM CHLORIDE: 600; 310; 30; 20 INJECTION, SOLUTION INTRAVENOUS at 08:27

## 2017-09-07 NOTE — H&P
HPI: Latisha Powell is a 64 y.o. male presenting with chief complain of need for colorectal cancer screening. Past Medical History:   Diagnosis Date    BPPV (benign paroxysmal positional vertigo) 5/26/2010    CABG x 4     CAD (coronary artery disease), native coronary artery     cath (jan 2014): 40% LM, pLAD 85%, dLAD 70%, dLCx 80%, mRCA 95%. s/p CABG x 4 (LIMA->D, SVG->dLAD, Seq SVG -> RPDA & OM2)    Cardiac catheterization 01/10/2014    pLM 40%. pLAD 85%. dCX 80%. mRCA 95%. CABG recommended.  Cardiac echocardiogram 09/03/2014    Tech difficult. EF 40-45%. Mild, diffuse hypk. Mild LVH. Indeterminate diastolic fx. Mild LAE. No significant valvular heart disease.  Carotid duplex 01/13/2014    Mod 50-69% VALENTIN stenosis. Mild 7-65% LICA stenosis. Occluded right vertebral.       Carpal tunnel syndrome     Cerebrovascular disease     Moderate VALENTIN, mild LICA, occluded R vertebral (Doppler Jan 2014)    Chest pain, musculoskeletal     post CABG    Diabetes ([address]     Dyslipidemia     H/O tobacco use, presenting hazards to health     quit 2014    Headache     cluster    Heart attack ([address]     Hiatal hernia     Hypercholesterolemia     Hypertension     Hypertensive heart disease with congestive heart failure ([address]     Internal carotid artery stenosis 5/26/2010    Obesity (BMI 30-39. 9)     Sleep apnea     does not use cpap machine    Stroke ([address] 5/22/09    TIA (no residuals)    Systolic HF (heart failure) ([facility])     LV EF 35% (echo Jan 2014). Mean LA pressure 32 (Jan 2014)    Vertigo        Past Surgical History:   Procedure Laterality Date    HX CORONARY ARTERY BYPASS GRAFT  1/14/14    LIMA to the D1, and GSV to the distal LAD, and anotherGSV sequentially to the PDA of the RCA and to theOM2 and ascending aortic endarterectomy, Dr. Michele Arellano, 1/14/14.     HX CORONARY STENT PLACEMENT      HX UROLOGICAL      repair of spermatocele       Family History   Problem Relation Age of Onset  Cancer Father      prostate    Heart Disease Father     Heart Disease Brother      age 48   [address] [facility] Other Son      Brain damage    Heart Attack Brother        Social History     Social History    Marital status:      Spouse name: N/A    Number of children: N/A    Years of education: N/A     Occupational History    retired      Social History Main Topics    Smoking status: Former Smoker     Packs/day: 0.25     Years: 10.00     Types: Cigarettes     Quit date: 1/9/2014    Smokeless tobacco: Never Used      Comment:  less then 5 ciggerates a week    Alcohol use Yes      Comment: beer occasionally    Drug use: No    Sexual activity: Not Currently     Partners: Female     Other Topics Concern    None     Social History Narrative       Review of Systems - negative    Outpatient Prescriptions Marked as Taking for the 9/7/17 encounter [facility] Encounter)   Medication Sig Dispense Refill    lisinopril (PRINIVIL, ZESTRIL) 5 mg tablet TAKE ONE TABLET BY MOUTH ONCE DAILY 30 Tab 11    potassium chloride (KLOR-CON M10) 10 mEq tablet TAKE ONE TABLET BY MOUTH ONCE DAILY 30 Tab 11    carvedilol (COREG) 12.5 mg tablet TAKE ONE TABLET BY MOUTH TWICE DAILY WITH MEALS 60 Tab 11    glyBURIDE (DIABETA) 2.5 mg tablet TAKE ONE TABLET BY MOUTH ONCE DAILY BEFORE  BREAKFAST 30 Tab 4    rosuvastatin (CRESTOR) 10 mg tablet Take 1 Tab by mouth nightly. 30 Tab 6    clopidogrel (PLAVIX) 75 mg tab Take 1 Tab by mouth daily. 30 Tab 11    furosemide (LASIX) 40 mg tablet TAKE ONE TABLET BY MOUTH ONCE DAILY 30 Tab 11    nitroglycerin (NITROSTAT) 0.4 mg SL tablet 1 Tab by SubLINGual route every five (5) minutes as needed for Chest Pain. 1 Bottle 0    aspirin 81 mg chewable tablet Take 1 Tab by mouth daily.  30 Tab 3       Allergies   Allergen Reactions    Atorvastatin Myalgia     High dose    Peanut Diarrhea and Nausea and Vomiting    Acetaminophen Other (comments)     hallucinations    Dimethicone Rash    Percocet [Oxycodone-Acetaminophen] Other (comments)     hallucinations       Vitals:    08/24/17 1522   Weight: 83 kg (183 lb)   Height: 5' 3\" (1.6 m)       Physical Exam   Constitutional: He appears well-developed and well-nourished. HENT:   Head: Normocephalic and atraumatic. Eyes: Conjunctivae and EOM are normal.   Abdominal: Soft. He exhibits no distension and no mass. There is no tenderness. Musculoskeletal: Normal range of motion. Lymphadenopathy:     He has no cervical adenopathy. Right: No inguinal adenopathy present. Left: No inguinal adenopathy present. Neurological: He exhibits normal muscle tone. Skin: Skin is warm and dry. Psychiatric: He has a normal mood and affect. His speech is normal.       Assessment / Plan    colonoscopy    The diagnoses and plan were discussed with the patient. All questions answered. Plan of care agreed to by all concerned.

## 2017-09-07 NOTE — ANESTHESIA POSTPROCEDURE EVALUATION
Post-Anesthesia Evaluation and Assessment    Patient: Amanda Wilhelm MRN: [id number]  SSN: [id number]    YOB: 1955  Age: 64 y.o. Sex: male       Cardiovascular Function/Vital Signs  Visit Vitals    /69    Pulse 72    Temp 36.6 °C (97.9 °F)    Resp 15    Ht 5' 3\" (1.6 m)    Wt 82.1 kg (181 lb)    SpO2 97%    BMI 32.06 kg/m2       Patient is status post MAC anesthesia for Procedure(s):  COLONOSCOPY with polypectomy. Nausea/Vomiting: None    Postoperative hydration reviewed and adequate. Pain:  Pain Scale 1: Numeric (0 - 10) (09/07/17 0858)  Pain Intensity 1: 0 (09/07/17 0858)   Managed    Neurological Status:   Neuro (WDL): Within Defined Limits (09/07/17 0858)   At baseline    Mental Status and Level of Consciousness: Arousable    Pulmonary Status:   O2 Device: Room air (09/07/17 0858)   Adequate oxygenation and airway patent    Complications related to anesthesia: None    Post-anesthesia assessment completed.  No concerns    Signed By: Luciana Lozano MD     September 7, 2017

## 2017-09-07 NOTE — ANESTHESIA POSTPROCEDURE EVALUATION
Post-Anesthesia Evaluation & Assessment    Visit Vitals    /69    Pulse 72    Temp 36.6 °C (97.9 °F)    Resp 15    Ht 5' 3\" (1.6 m)    Wt 82.1 kg (181 lb)    SpO2 97%    BMI 32.06 kg/m2       Post-operative hydration adequate. Pain score (VAS): 0 Pain Scale 1: Numeric (0 - 10) (09/07/17 0858)  Pain Intensity 1: 0 (09/07/17 0858)   Managed. Mental status & Level of consciousness: returned to baseline    Neurological status: returned to baseline    Pulmonary status: airway patent, oxygen given as needed. Complications related to anesthesia: none    Patient has met all discharge requirements.     Additional comments:        Conchis Frederick MD  September 7, 2017

## 2017-09-07 NOTE — ANESTHESIA PREPROCEDURE EVALUATION
Anesthetic History   No history of anesthetic complications            Review of Systems / Medical History  Patient summary reviewed and pertinent labs reviewed    Pulmonary        Sleep apnea: CPAP           Neuro/Psych       CVA: no residual symptoms  TIA     Cardiovascular    Hypertension: well controlled      CHF: NYHA Classification II, dyspnea on exertion    Past MI, CAD and CABG    Exercise tolerance: <4 METS     GI/Hepatic/Renal  Within defined limits              Endo/Other    Diabetes: well controlled, type 2    Morbid obesity     Other Findings   Comments:   Risk Factors for Postoperative nausea/vomiting:       History of postoperative nausea/vomiting? NO       Female? NO       Motion sickness? NO       Intended opioid administration for postoperative analgesia? NO      Smoking Abstinence  Current Smoker? NO  Elective Surgery? YES  Seen preoperatively by anesthesiologist or proxy prior to day of surgery? YES  Pt abstained from smoking 24 hours prior to anesthesia?  N/A           Physical Exam    Airway  Mallampati: II  TM Distance: 4 - 6 cm  Neck ROM: normal range of motion   Mouth opening: Normal     Cardiovascular  Regular rate and rhythm,  S1 and S2 normal,  no murmur, click, rub, or gallop             Dental    Dentition: Poor dentition     Pulmonary  Breath sounds clear to auscultation               Abdominal  GI exam deferred       Other Findings            Anesthetic Plan    ASA: 3  Anesthesia type: MAC          Induction: Intravenous  Anesthetic plan and risks discussed with: Patient

## 2017-09-07 NOTE — IP AVS SNAPSHOT
[address] [address] 
[phone] Patient: Jose Carlos Sampson MRN: [id number] You are allergic to the following Allergen Reactions Atorvastatin Myalgia High dose Peanut Diarrhea Nausea and Vomiting Acetaminophen Other (comments)  
 hallucinations Dimethicone Rash Percocet (Oxycodone-Acetaminophen) Other (comments)  
 hallucinations Recent Documentation Height Weight BMI Smoking Status 1.6 m 82.1 kg 32.06 kg/m2 Former Smoker Emergency Contacts Name Discharge Info Relation Home Work Mobile Darrell Pedro Jr. DISCHARGE CAREGIVER [3] Child [2] [phone] [address] CAREGIVER [3] Parent [1] [phone] Malu Pedro DISCHARGE CAREGIVER [3] Child [2] [phone] About your hospitalization You were admitted on:  September 7, 2017 You last received care in the:  [facility] PACU You were discharged on:  September 7, 2017 Unit phone number:  [phone] Why you were hospitalized Your primary diagnosis was:  Not on File Providers Seen During Your Hospitalizations Provider Role Specialty Primary office phone Mabel Irwin MD Attending Provider Colon and Rectal Surgery [phone] Your Primary Care Physician (PCP) Primary Care Physician Office Phone Office Fax Cecilia Fletcher [phone] Follow-up Information Follow up With Details Comments Contact Info Shilpa Wilkins MD   [facility] [place] [address] 200 [facility] 
[phone] Mabel Irwin MD  Follow up as needed. Repeat Colonoscopy in 5 years. 15[address] [facility] 200 [facility] 
[phone] Current Discharge Medication List  
  
CONTINUE these medications which have NOT CHANGED Dose & Instructions Dispensing Information Comments Morning Noon Evening Bedtime  
 carvedilol 12.5 mg tablet Commonly known as:  Vicenta Martínez Your last dose was: Your next dose is: TAKE ONE TABLET BY MOUTH TWICE DAILY WITH MEALS Quantity:  60 Tab Refills:  11  
     
   
   
   
  
 cpap machine kit Your last dose was: Your next dose is:    
   
   
 by Does Not Apply route. Overnight CPAP at 16 CWP with ramp and humidifier. Mask: Simplus FF small or mask of choice. Supplies 99 month. Please send compliance data [id number]. Diagnosis DAYANA. AHI 52 RDI 52 Quantity:  1 Kit Refills:  0  
     
   
   
   
  
 furosemide 40 mg tablet Commonly known as:  LASIX Your last dose was: Your next dose is: TAKE ONE TABLET BY MOUTH ONCE DAILY Quantity:  30 Tab Refills:  11  
     
   
   
   
  
 glyBURIDE 2.5 mg tablet Commonly known as:  Latrice Lind Your last dose was: Your next dose is: TAKE ONE TABLET BY MOUTH ONCE DAILY BEFORE  BREAKFAST Quantity:  30 Tab Refills:  4  
     
   
   
   
  
 lisinopril 5 mg tablet Commonly known as:  Saúl Monge Your last dose was: Your next dose is: TAKE ONE TABLET BY MOUTH ONCE DAILY Quantity:  30 Tab Refills:  11  
     
   
   
   
  
 nitroglycerin 0.4 mg SL tablet Commonly known as:  NITROSTAT Your last dose was: Your next dose is:    
   
   
 Dose:  0.4 mg  
1 Tab by SubLINGual route every five (5) minutes as needed for Chest Pain. Quantity:  1 Bottle Refills:  0  
     
   
   
   
  
 potassium chloride 10 mEq tablet Commonly known as:  KLOR-CON M10 Your last dose was: Your next dose is: TAKE ONE TABLET BY MOUTH ONCE DAILY Quantity:  30 Tab Refills:  11  
     
   
   
   
  
 rosuvastatin 10 mg tablet Commonly known as:  CRESTOR Your last dose was:     
   
Your next dose is:    
   
   
 Dose:  10 mg  
 Take 1 Tab by mouth nightly. Quantity:  30 Tab Refills:  6 STOP taking these medications   
 aspirin 81 mg chewable tablet  
   
  
 clopidogrel 75 mg Tab Commonly known as:  PLAVIX Discharge Instructions No aspirin or ibuprofen, Plavix (e.g. Aleve, Motrin, Advil) for 5 days. Repeat colonoscopy in 5 years. Colonoscopy: What to Expect at [facility] Your Recovery After you have a colonoscopy, you will stay at the clinic for 1 to 2 hours until the medicines wear off. Then you can go home. But you will need to arrange for a ride. Your doctor will tell you when you can eat and do your other usual activities. Your doctor will talk to you about when you will need your next colonoscopy. Your doctor can help you decide how often you need to be checked. This will depend on the results of your test and your risk for colorectal cancer. After the test, you may be bloated or have gas pains. You may need to pass gas. If a biopsy was done or a polyp was removed, you may have streaks of blood in your stool (feces) for a few days. This care sheet gives you a general idea about how long it will take for you to recover. But each person recovers at a different pace. Follow the steps below to get better as quickly as possible. How can you care for yourself at home? Activity · Rest when you feel tired. · You can do your normal activities when it feels okay to do so. Diet · Follow your doctor's directions for eating. · Unless your doctor has told you not to, drink plenty of fluids. This helps to replace the fluids that were lost during the colon prep. · Do not drink alcohol. Medicines · Your doctor will tell you if and when you can restart your medicines. He or she will also give you instructions about taking any new medicines.  
· If you take blood thinners, such as warfarin (Coumadin), clopidogrel (Plavix), or aspirin, be sure to talk to your doctor. He or she will tell you if and when to start taking those medicines again. Make sure that you understand exactly what your doctor wants you to do. · If polyps were removed or a biopsy was done during the test, your doctor may tell you not to take aspirin or other anti-inflammatory medicines for a few days. These include ibuprofen (Advil, Motrin) and naproxen (Aleve). Other instructions · For your safety, do not drive or operate machinery until the medicine wears off and you can think clearly. Your doctor may tell you not to drive or operate machinery until the day after your test. 
· Do not sign legal documents or make major decisions until the medicine wears off and you can think clearly. The anesthesia can make it hard for you to fully understand what you are agreeing to. Follow-up care is a key part of your treatment and safety. Be sure to make and go to all appointments, and call your doctor if you are having problems. It's also a good idea to know your test results and keep a list of the medicines you take. When should you call for help? Call 911 anytime you think you may need emergency care. For example, call if: 
· You passed out (lost consciousness). · You pass maroon or bloody stools. · You have severe belly pain. Call your doctor now or seek immediate medical care if: 
· Your stools are black and tarlike. · Your stools have streaks of blood, but you did not have a biopsy or any polyps removed. · You have belly pain, or your belly is swollen and firm. · You vomit. · You have a fever. · You are very dizzy. Watch closely for changes in your health, and be sure to contact your doctor if you have any problems. Where can you learn more? Go to http://miah-lucy.info/. Enter E264 in the search box to learn more about \"Colonoscopy: What to Expect at Home. \" Current as of: August 9, 2016 Content Version: 11.3 © 3601-5368 [employer]Founder. Care instructions adapted under license by [employer]Land[employer] (which disclaims liability or warranty for this information). If you have questions about a medical condition or this instruction, always ask your healthcare professional. [address] any warranty or liability for your use of this information. Colon Polyps: Care Instructions Your Care Instructions Colon polyps are growths in the colon or the rectum. The cause of most colon polyps is not known, and most people who get them do not have any problems. But a certain kind can turn into cancer. For this reason, regular testing for colon polyps is important for people age 48 and older and anyone who has an increased risk for colon cancer. Polyps are usually found through routine colon cancer screening tests. Although most colon polyps are not cancerous, they are usually removed and then tested for cancer. Screening for colon cancer saves lives because the cancer can usually be cured if it is caught early. If you have a polyp that is the type that can turn into cancer, you may need more tests to examine your entire colon. The doctor will remove any other polyps that he or she finds, and you will be tested more often. Follow-up care is a key part of your treatment and safety. Be sure to make and go to all appointments, and call your doctor if you are having problems. It's also a good idea to know your test results and keep a list of the medicines you take. How can you care for yourself at home? Regular exams to look for colon polyps are the best way to prevent polyps from turning into colon cancer. These can include stool tests, sigmoidoscopy, colonoscopy, and CT colonography. Talk with your doctor about a testing schedule that is right for you. To prevent polyps There is no home treatment that can prevent colon polyps. But these steps may help lower your risk for cancer. · Stay active. Being active can help you get to and stay at a healthy weight. Try to exercise on most days of the week. Walking is a good choice. · Eat well. Choose a variety of vegetables, fruits, legumes (such as peas and beans), fish, poultry, and whole grains. · Do not smoke. If you need help quitting, talk to your doctor about stop-smoking programs and medicines. These can increase your chances of quitting for good. · If you drink alcohol, limit how much you drink. Limit alcohol to 2 drinks a day for men and 1 drink a day for women. When should you call for help? Call your doctor now or seek immediate medical care if: 
· You have severe belly pain. · Your stools are maroon or very bloody. Watch closely for changes in your health, and be sure to contact your doctor if: 
· You have a fever. · You have nausea or vomiting. · You have a change in bowel habits (new constipation or diarrhea). · Your symptoms get worse or are not improving as expected. Where can you learn more? Go to http://miah-lucy.info/. Enter 95 969859 in the search box to learn more about \"Colon Polyps: Care Instructions. \" Current as of: May 5, 2017 Content Version: 11.3 © 0920-7757 [employer]biMatrix. Care instructions adapted under license by [employer] (which disclaims liability or warranty for this information). If you have questions about a medical condition or this instruction, always ask your healthcare professional. [place] [address] any warranty or liability for your use of this information. DISCHARGE SUMMARY from Nurse The following personal items are in your possession at time of discharge: 
 
Dental Appliances: None Visual Aid: None PATIENT INSTRUCTIONS: 
 
 
F-face looks uneven A-arms unable to move or move unevenly S-speech slurred or non-existent T-time-call 911 as soon as signs and symptoms begin-DO NOT go  
 Back to bed or wait to see if you get better-TIME IS BRAIN. Warning Signs of HEART ATTACK Call 911 if you have these symptoms: 
? Chest discomfort. Most heart attacks involve discomfort in the center of the chest that lasts more than a few minutes, or that goes away and comes back. It can feel like uncomfortable pressure, squeezing, fullness, or pain. ? Discomfort in other areas of the upper body. Symptoms can include pain or discomfort in one or both arms, the back, neck, jaw, or stomach. ? Shortness of breath with or without chest discomfort. ? Other signs may include breaking out in a cold sweat, nausea, or lightheadedness. Don't wait more than five minutes to call [address]! Fast action can save your life. Calling 911 is almost always the fastest way to get lifesaving treatment. Emergency Medical Services staff can begin treatment when they arrive  up to an hour sooner than if someone gets to the hospital by car. The discharge information has been reviewed with the patient. The patient verbalized understanding. Discharge medications reviewed with the patient and appropriate educational materials and side effects teaching were provided. Discharge Orders None General Information Please provide this summary of care documentation to your next provider. Patient Signature:  ____________________________________________________________ Date:  ____________________________________________________________  
  
Mariah Mares Provider Signature:  ____________________________________________________________ Date:  ____________________________________________________________

## 2017-09-07 NOTE — DISCHARGE INSTRUCTIONS
No aspirin or ibuprofen, Plavix (e.g. Aleve, Motrin, Advil) for 5 days. Repeat colonoscopy in 5 years. Colonoscopy: What to Expect at [address] [place] [address]  After you have a colonoscopy, you will stay at the clinic for 1 to 2 hours until the medicines wear off. Then you can go home. But you will need to arrange for a ride. Your doctor will tell you when you can eat and do your other usual activities. Your doctor will talk to you about when you will need your next colonoscopy. Your doctor can help you decide how often you need to be checked. This will depend on the results of your test and your risk for colorectal cancer. After the test, you may be bloated or have gas pains. You may need to pass gas. If a biopsy was done or a polyp was removed, you may have streaks of blood in your stool (feces) for a few days. This care sheet gives you a general idea about how long it will take for you to recover. But each person recovers at a different pace. Follow the steps below to get better as quickly as possible. How can you care for yourself at home? Activity  · Rest when you feel tired. · You can do your normal activities when it feels okay to do so. Diet  · Follow your doctor's directions for eating. · Unless your doctor has told you not to, drink plenty of fluids. This helps to replace the fluids that were lost during the colon prep. · Do not drink alcohol. Medicines  · Your doctor will tell you if and when you can restart your medicines. He or she will also give you instructions about taking any new medicines. · If you take blood thinners, such as warfarin (Coumadin), clopidogrel (Plavix), or aspirin, be sure to talk to your doctor. He or she will tell you if and when to start taking those medicines again. Make sure that you understand exactly what your doctor wants you to do.   · If polyps were removed or a biopsy was done during the test, your doctor may tell you not to take aspirin or other anti-inflammatory medicines for a few days. These include ibuprofen (Advil, Motrin) and naproxen (Aleve). Other instructions  · For your safety, do not drive or operate machinery until the medicine wears off and you can think clearly. Your doctor may tell you not to drive or operate machinery until the day after your test.  · Do not sign legal documents or make major decisions until the medicine wears off and you can think clearly. The anesthesia can make it hard for you to fully understand what you are agreeing to. Follow-up care is a key part of your treatment and safety. Be sure to make and go to all appointments, and call your doctor if you are having problems. It's also a good idea to know your test results and keep a list of the medicines you take. When should you call for help? Call 911 anytime you think you may need emergency care. For example, call if:  · You passed out (lost consciousness). · You pass maroon or bloody stools. · You have severe belly pain. Call your doctor now or seek immediate medical care if:  · Your stools are black and tarlike. · Your stools have streaks of blood, but you did not have a biopsy or any polyps removed. · You have belly pain, or your belly is swollen and firm. · You vomit. · You have a fever. · You are very dizzy. Watch closely for changes in your health, and be sure to contact your doctor if you have any problems. Where can you learn more? Go to http://miah-lucy.info/. Enter E264 in the search box to learn more about \"Colonoscopy: What to Expect at Home. \"  Current as of: August 9, 2016  Content Version: 11.3  © 7989-0354 CoinSeed. Care instructions adapted under license by [employer]dial (which disclaims liability or warranty for this information).  If you have questions about a medical condition or this instruction, always ask your healthcare professional. Claudean Flock disclaims any warranty or liability for your use of this information. Colon Polyps: Care Instructions  Your Care Instructions    Colon polyps are growths in the colon or the rectum. The cause of most colon polyps is not known, and most people who get them do not have any problems. But a certain kind can turn into cancer. For this reason, regular testing for colon polyps is important for people age 48 and older and anyone who has an increased risk for colon cancer. Polyps are usually found through routine colon cancer screening tests. Although most colon polyps are not cancerous, they are usually removed and then tested for cancer. Screening for colon cancer saves lives because the cancer can usually be cured if it is caught early. If you have a polyp that is the type that can turn into cancer, you may need more tests to examine your entire colon. The doctor will remove any other polyps that he or she finds, and you will be tested more often. Follow-up care is a key part of your treatment and safety. Be sure to make and go to all appointments, and call your doctor if you are having problems. It's also a good idea to know your test results and keep a list of the medicines you take. How can you care for yourself at home? Regular exams to look for colon polyps are the best way to prevent polyps from turning into colon cancer. These can include stool tests, sigmoidoscopy, colonoscopy, and CT colonography. Talk with your doctor about a testing schedule that is right for you. To prevent polyps  There is no home treatment that can prevent colon polyps. But these steps may help lower your risk for cancer. · Stay active. Being active can help you get to and stay at a healthy weight. Try to exercise on most days of the week. Walking is a good choice. · Eat well. Choose a variety of vegetables, fruits, legumes (such as peas and beans), fish, poultry, and whole grains. · Do not smoke.  If you need help quitting, talk to your doctor about stop-smoking programs and medicines. These can increase your chances of quitting for good. · If you drink alcohol, limit how much you drink. Limit alcohol to 2 drinks a day for men and 1 drink a day for women. When should you call for help? Call your doctor now or seek immediate medical care if:  · You have severe belly pain. · Your stools are maroon or very bloody. Watch closely for changes in your health, and be sure to contact your doctor if:  · You have a fever. · You have nausea or vomiting. · You have a change in bowel habits (new constipation or diarrhea). · Your symptoms get worse or are not improving as expected. Where can you learn more? Go to http://miah-lucy.info/. Enter 95 [id number] in the search box to learn more about \"Colon Polyps: Care Instructions. \"  Current as of: May 5, 2017  Content Version: 11.3  © 2687-8500 [employer]ADS. Care instructions adapted under license by [employer] (which disclaims liability or warranty for this information). If you have questions about a medical condition or this instruction, always ask your healthcare professional. [place] [address] any warranty or liability for your use of this information. DISCHARGE SUMMARY from Nurse    The following personal items are in your possession at time of discharge:    Dental Appliances: None  Visual Aid: None                    PATIENT INSTRUCTIONS:    After general anesthesia or intravenous sedation, for 24 hours or while taking prescription Narcotics:  · Limit your activities  · Do not drive and operate hazardous machinery  · Do not make important personal or business decisions  · Do  not drink alcoholic beverages  · If you have not urinated within 8 hours after discharge, please contact your surgeon on call.     Report the following to your surgeon:  · Excessive pain, swelling, redness or odor of or around the surgical area  · Temperature over 100.5  · Nausea and vomiting lasting longer than 4 hours or if unable to take medications  · Any signs of decreased circulation or nerve impairment to extremity: change in color, persistent  numbness, tingling, coldness or increase pain  · Any questions    *  Please give a list of your current medications to your Primary Care Provider. *  Please update this list whenever your medications are discontinued, doses are      changed, or new medications (including over-the-counter products) are added. *  Please carry medication information at all times in case of emergency situations. These are general instructions for a healthy lifestyle:    No smoking/ No tobacco products/ Avoid exposure to second hand smoke    Surgeon General's Warning:  Quitting smoking now greatly reduces serious risk to your health. Obesity, smoking, and sedentary lifestyle greatly increases your risk for illness    A healthy diet, regular physical exercise & weight monitoring are important for maintaining a healthy lifestyle    You may be retaining fluid if you have a history of heart failure or if you experience any of the following symptoms:  Weight gain of 3 pounds or more overnight or 5 pounds in a week, increased swelling in our hands or feet or shortness of breath while lying flat in bed. Please call your doctor as soon as you notice any of these symptoms; do not wait until your next office visit. Recognize signs and symptoms of STROKE:    F-face looks uneven    A-arms unable to move or move unevenly    S-speech slurred or non-existent    T-time-call 911 as soon as signs and symptoms begin-DO NOT go       Back to bed or wait to see if you get better-TIME IS BRAIN. Warning Signs of HEART ATTACK     Call 911 if you have these symptoms:   Chest discomfort. Most heart attacks involve discomfort in the center of the chest that lasts more than a few minutes, or that goes away and comes back.  It can feel like uncomfortable pressure, squeezing, fullness, or pain.  Discomfort in other areas of the upper body. Symptoms can include pain or discomfort in one or both arms, the back, neck, jaw, or stomach.  Shortness of breath with or without chest discomfort.  Other signs may include breaking out in a cold sweat, nausea, or lightheadedness. Don't wait more than five minutes to call 911 - MINUTES MATTER! Fast action can save your life. Calling 911 is almost always the fastest way to get lifesaving treatment. Emergency Medical Services staff can begin treatment when they arrive -- up to an hour sooner than if someone gets to the hospital by car. The discharge information has been reviewed with the patient. The patient verbalized understanding. Discharge medications reviewed with the patient and appropriate educational materials and side effects teaching were provided.

## 2017-09-07 NOTE — PROCEDURES
[facility]  [facility], [address]  [phone]                    Colonoscopy Procedure Note      Latisha Powell  1955  [id number]                Date of Procedure: 9/7/2017    Preoperative diagnosis: Colon cancer screening [Z12.11]    Postoperative diagnosis: descending colon polyp. :  Mat Salazar MD    Assistant(s): Endoscopy Technician-1: Belkis White  Endoscopy RN-1: Sommer Fernandez RN    Sedation: MAC    Procedure Details:  Prior to the procedure, a history and physical were performed. The patients medications, allergies and sensitivities were reviewed and all questions were answered. After informed consent was obtained for the procedure, with all risks and benefits of procedure explained. The patient was taken to the endoscopy suite and placed in the left lateral decubitus position. Patient identification and proposed procedure were verified prior to the procedure by the nurse and I. Following sequential administration of sedation as per Anesthesia, a digital rectal exam was performed and was normal.  The Olympus video colonoscope was introduced through the anus and advanced to terminal ileum. The quality of preparation was good. The colonoscope was slowly withdrawn and the mucosa examined for any abnormalities. Cecal withdrawl time was greater than six minutes. The patient tolerated the procedure well. Findings/Interventions:   Polyps - #1, 5 mm in size, located in the descending colon, removed by snare cautery and retrieved for pathology    EBL: none    Recommendations: -Repeat colonoscopy in 5 years.    NO aspirin or Plavix for 5 days     Discharge Disposition:  Home  Mat Salazar MD  9/7/2017  9:07 AM

## 2017-09-20 ENCOUNTER — TELEPHONE (OUTPATIENT)
Dept: SURGERY | Age: 62
End: 2017-09-20

## 2017-09-20 NOTE — TELEPHONE ENCOUNTER
----- Message from Shahana Wheeler MD sent at 9/11/2017  8:16 AM EDT -----  Benign polyp(s). Repeat colonoscopy in 5 years as planned. Patient notified of results. Tatyana in [place] for 5 years. Patient understands.

## 2017-09-22 NOTE — PROGRESS NOTES
[phone] (home) Spoke with Raiza JAMA verified, we tried scheduling annual medicare/routine.  Raiza Roa states he needs morning appointments and will call back once he walks his dog

## 2017-09-22 NOTE — PROGRESS NOTES
Patient due for routine and Medicare wellness. Please schedule and we will review his labs with him at that time.

## 2017-10-02 RX ORDER — POTASSIUM CHLORIDE 750 MG/1
TABLET, EXTENDED RELEASE ORAL
Qty: 90 TAB | Refills: 3 | OUTPATIENT
Start: 2017-10-02 | End: 2018-11-20 | Stop reason: SDUPTHER

## 2017-10-02 RX ORDER — FUROSEMIDE 40 MG/1
TABLET ORAL
Qty: 90 TAB | Refills: 3 | OUTPATIENT
Start: 2017-10-02 | End: 2019-08-28 | Stop reason: SDUPTHER

## 2017-10-16 ENCOUNTER — OFFICE VISIT (OUTPATIENT)
Dept: FAMILY MEDICINE CLINIC | Age: 62
End: 2017-10-16

## 2017-10-16 VITALS
RESPIRATION RATE: 14 BRPM | SYSTOLIC BLOOD PRESSURE: 100 MMHG | HEART RATE: 71 BPM | WEIGHT: 181 LBS | TEMPERATURE: 98.5 F | HEIGHT: 63 IN | BODY MASS INDEX: 32.07 KG/M2 | DIASTOLIC BLOOD PRESSURE: 68 MMHG | OXYGEN SATURATION: 98 %

## 2017-10-16 VITALS
SYSTOLIC BLOOD PRESSURE: 100 MMHG | TEMPERATURE: 98.5 F | HEIGHT: 63 IN | HEART RATE: 71 BPM | WEIGHT: 181 LBS | DIASTOLIC BLOOD PRESSURE: 68 MMHG | RESPIRATION RATE: 14 BRPM | BODY MASS INDEX: 32.07 KG/M2 | OXYGEN SATURATION: 98 %

## 2017-10-16 DIAGNOSIS — G47.33 SEVERE OBSTRUCTIVE SLEEP APNEA: ICD-10-CM

## 2017-10-16 DIAGNOSIS — Z95.1 S/P CABG X 4: ICD-10-CM

## 2017-10-16 DIAGNOSIS — Z71.89 ADVANCED DIRECTIVES, COUNSELING/DISCUSSION: ICD-10-CM

## 2017-10-16 DIAGNOSIS — Z13.31 SCREENING FOR DEPRESSION: ICD-10-CM

## 2017-10-16 DIAGNOSIS — J44.9 CHRONIC OBSTRUCTIVE PULMONARY DISEASE, UNSPECIFIED COPD TYPE (HCC): ICD-10-CM

## 2017-10-16 DIAGNOSIS — I11.0 HYPERTENSIVE HEART DISEASE WITH CONGESTIVE HEART FAILURE (HCC): ICD-10-CM

## 2017-10-16 DIAGNOSIS — E11.9 CONTROLLED TYPE 2 DIABETES MELLITUS WITHOUT COMPLICATION, WITHOUT LONG-TERM CURRENT USE OF INSULIN (HCC): Primary | ICD-10-CM

## 2017-10-16 DIAGNOSIS — Z00.00 INITIAL MEDICARE ANNUAL WELLNESS VISIT: Primary | ICD-10-CM

## 2017-10-16 DIAGNOSIS — I73.9 CLAUDICATION (HCC): ICD-10-CM

## 2017-10-16 DIAGNOSIS — Z98.61 CAD S/P PERCUTANEOUS CORONARY ANGIOPLASTY: ICD-10-CM

## 2017-10-16 DIAGNOSIS — I25.10 CAD S/P PERCUTANEOUS CORONARY ANGIOPLASTY: ICD-10-CM

## 2017-10-16 DIAGNOSIS — E78.5 DYSLIPIDEMIA: ICD-10-CM

## 2017-10-16 DIAGNOSIS — R07.89 CHEST PAIN, MUSCULOSKELETAL: ICD-10-CM

## 2017-10-16 DIAGNOSIS — E66.9 OBESITY (BMI 30-39.9): ICD-10-CM

## 2017-10-16 DIAGNOSIS — I50.22 CHRONIC SYSTOLIC HF (HEART FAILURE) (HCC): ICD-10-CM

## 2017-10-16 RX ORDER — CLOPIDOGREL BISULFATE 75 MG/1
75 TABLET ORAL DAILY
Qty: 90 TAB | Status: CANCELLED | OUTPATIENT
Start: 2017-10-16

## 2017-10-16 RX ORDER — ASPIRIN 81 MG/1
81 TABLET ORAL DAILY
COMMUNITY

## 2017-10-16 RX ORDER — CLOPIDOGREL BISULFATE 75 MG/1
75 TABLET ORAL DAILY
COMMUNITY
Start: 2017-09-28 | End: 2018-04-23 | Stop reason: SDUPTHER

## 2017-10-16 NOTE — MR AVS SNAPSHOT
Visit Information Date & Time Provider Department Dept. Phone Encounter #  
 10/16/2017  8:30 AM Patsy Alejandro MD [address] [id number] Follow-up Instructions Return in about 1 year (around 10/16/2018) for Medicare Wellness exam.  
  
Your Appointments 11/8/2017  8:40 AM  
Follow Up with Kiera Jefferson MD  
[facility] \[facility]\"" ([facility]) Appt Note: 6 month f/up [place] [address] [address] [address] [phone] [facility] [address] Upcoming Health Maintenance Date Due  
 FOOT EXAM Q1 12/10/1965 HEMOGLOBIN A1C Q6M 2/16/2018 EYE EXAM RETINAL OR DILATED Q1 2/16/2018 MICROALBUMIN Q1 8/16/2018 LIPID PANEL Q1 8/16/2018 COLONOSCOPY 9/7/2022 DTaP/Tdap/Td series (2 - Td) 10/16/2027 Allergies as of 10/16/2017  Review Complete On: 10/16/2017 By: Love Mccarthy LPN Severity Noted Reaction Type Reactions Atorvastatin Medium 11/26/2014   Side Effect Myalgia High dose Peanut Medium 01/15/2014    Diarrhea, Nausea and Vomiting Acetaminophen  01/06/2017    Other (comments)  
 hallucinations Dimethicone  01/30/2015    Rash Percocet [Oxycodone-acetaminophen]  01/20/2014   Side Effect Other (comments)  
 hallucinations Current Immunizations  Reviewed on 3/27/2017 Name Date Influenza Vaccine 9/18/2014 Pneumococcal Polysaccharide (PPSV-23) 9/18/2014 Not reviewed this visit You Were Diagnosed With   
  
 Codes Comments Controlled type 2 diabetes mellitus without complication, without long-term current use of insulin ([facility]    -  Primary ICD-10-CM: E11.9 ICD-9-CM: 250.00 Chronic systolic HF (heart failure) (HCC)     ICD-10-CM: I50.22 ICD-9-CM: 428.22 Hypertensive heart disease with congestive heart failure ([facility]     ICD-10-CM: I11.0 ICD-9-CM: 402.91, 428.0 CAD S/P percutaneous coronary angioplasty     ICD-10-CM: I25.10, Z98.61 ICD-9-CM: 414.01, V45.82 Chest pain, musculoskeletal     ICD-10-CM: R07.89 ICD-9-CM: 786.59 S/P CABG x 4     ICD-10-CM: Z95.1 ICD-9-CM: V45.81 Dyslipidemia     ICD-10-CM: E78.5 ICD-9-CM: 272.4 Severe obstructive sleep apnea     ICD-10-CM: G47.33 
ICD-9-CM: 327.23 Obesity (BMI 30-39. 9)     ICD-10-CM: E66.9 ICD-9-CM: 278.00 Vitals BP Pulse Temp Resp Height(growth percentile) Weight(growth percentile) 100/68 (BP 1 Location: Left arm, BP Patient Position: Sitting) 71 98.5 °F (36.9 °C) (Oral) 14 5' 3\" (1.6 m) 181 lb (82.1 kg) SpO2 BMI Smoking Status 98% 32.06 kg/m2 Former Smoker Vitals History BMI and BSA Data Body Mass Index Body Surface Area 32.06 kg/m 2 1.91 m 2 [facility], [facility] [address] [facility], [address] [place] [address] Rd [phone] Your Updated Medication List  
  
   
This list is accurate as of: 10/16/17  8:59 AM.  Always use your most recent med list.  
  
  
  
  
 aspirin delayed-release 81 mg tablet Take 81 mg by mouth daily. carvedilol 12.5 mg tablet Commonly known as:  COREG  
TAKE ONE TABLET BY MOUTH TWICE DAILY WITH MEALS  
  
 clopidogrel 75 mg Tab Commonly known as:  PLAVIX Take 75 mg by mouth daily. cpap machine kit  
by Does Not Apply route. Overnight CPAP at 16 CWP with ramp and humidifier. Mask: Simplus FF small or mask of choice. Supplies 99 month. Please send compliance data [id number]. Diagnosis DAYANA. AHI 52 RDI 52  
  
 furosemide 40 mg tablet Commonly known as:  LASIX TAKE ONE TABLET BY MOUTH ONCE DAILY with potassium chloride  
  
 glyBURIDE 2.5 mg tablet Commonly known as:  Yovanny Clarence TAKE ONE TABLET BY MOUTH ONCE DAILY BEFORE  BREAKFAST  
  
 lisinopril 5 mg tablet Commonly known as:  PRINIVIL, ZESTRIL  
TAKE ONE TABLET BY MOUTH ONCE DAILY nitroglycerin 0.4 mg SL tablet Commonly known as:  NITROSTAT  
1 Tab by SubLINGual route every five (5) minutes as needed for Chest Pain.  
  
 potassium chloride 10 mEq tablet Commonly known as:  KLOR-CON M10  
TAKE ONE TABLET BY MOUTH ONCE DAILY with Lasix  
  
 rosuvastatin 10 mg tablet Commonly known as:  CRESTOR Take 1 Tab by mouth nightly. Follow-up Instructions Return in about 1 year (around 10/16/2018) for Medicare Wellness exam. To-Do List   
 11/02/2017 8:30 AM  
  Appointment with HBV NUC CARD ROOM at [facility] NON-INVASIVE CARD ([phone]) This is a 2-part test which takes approximately 4 hours to complete. Please see part 2 of exam below for full instructions 11/02/2017 9:00 AM  
  Appointment with HBV NUC CARD ROOM at [facility] NON-INVASIVE CARD ([phone]) 1-No eating or coffee after midnight  2-Do not take diabetic meds (bring with) 3-Please take all other meds unless specified by cardiology Patient Instructions A Healthy Lifestyle: Care Instructions Your Care Instructions A healthy lifestyle can help you feel good, stay at a healthy weight, and have plenty of energy for both work and play. A healthy lifestyle is something you can share with your whole family. A healthy lifestyle also can lower your risk for serious health problems, such as high blood pressure, heart disease, and diabetes. You can follow a few steps listed below to improve your health and the health of your family. Follow-up care is a key part of your treatment and safety. Be sure to make and go to all appointments, and call your doctor if you are having problems. Its also a good idea to know your test results and keep a list of the medicines you take. How can you care for yourself at home? · Do not eat too much sugar, fat, or fast foods. You can still have dessert and treats now and then. The goal is moderation. · Start small to improve your eating habits. Pay attention to portion sizes, drink less juice and soda pop, and eat more fruits and vegetables. ¨ Eat a healthy amount of food. A 3-ounce serving of meat, for example, is about the size of a deck of cards. Fill the rest of your plate with vegetables and whole grains. ¨ Limit the amount of soda and sports drinks you have every day. Drink more water when you are thirsty. ¨ Eat at least 5 servings of fruits and vegetables every day. It may seem like a lot, but it is not hard to reach this goal. A serving or helping is 1 piece of fruit, 1 cup of vegetables, or 2 cups of leafy, raw vegetables. Have an apple or some carrot sticks as an afternoon snack instead of a candy bar. Try to have fruits and/or vegetables at every meal. 
· Make exercise part of your daily routine. You may want to start with simple activities, such as walking, bicycling, or slow swimming. Try to be active 30 to 60 minutes every day. You do not need to do all 30 to 60 minutes all at once. For example, you can exercise 3 times a day for 10 or 20 minutes. Moderate exercise is safe for most people, but it is always a good idea to talk to your doctor before starting an exercise program. 
· Keep moving. Griffin Sang the lawn, work in the garden, or [employer]. Take the stairs instead of the elevator at work. · If you smoke, quit. People who smoke have an increased risk for heart attack, stroke, cancer, and other lung illnesses. Quitting is hard, but there are ways to boost your chance of quitting tobacco for good. ¨ Use nicotine gum, patches, or lozenges. ¨ Ask your doctor about stop-smoking programs and medicines. ¨ Keep trying. In addition to reducing your risk of diseases in the future, you will notice some benefits soon after you stop using tobacco. If you have shortness of breath or asthma symptoms, they will likely get better within a few weeks after you quit. · Limit how much alcohol you drink. Moderate amounts of alcohol (up to 2 drinks a day for men, 1 drink a day for women) are okay. But drinking too much can lead to liver problems, high blood pressure, and other health problems. Family health If you have a family, there are many things you can do together to improve your health. · Eat meals together as a family as often as possible. · Eat healthy foods. This includes fruits, vegetables, lean meats and dairy, and whole grains. · Include your family in your fitness plan. Most people think of activities such as jogging or tennis as the way to fitness, but there are many ways you and your family can be more active. Anything that makes you breathe hard and gets your heart pumping is exercise. Here are some tips: 
¨ Walk to do errands or to take your child to school or the bus. ¨ Go for a family bike ride after dinner instead of watching TV. Where can you learn more? Go to http://miha-lucy.info/. Enter X435 in the search box to learn more about \"A Healthy Lifestyle: Care Instructions. \" Current as of: July 26, 2016 Content Version: 11.3 © 9568-0346 [employer], [employer]. Care instructions adapted under license by [employer] (which disclaims liability or warranty for this information). If you have questions about a medical condition or this instruction, always ask your healthcare professional. [place] [address] any warranty or liability for your use of this information. Follow up in 1 year for annual medicare wellness Please provide this summary of care documentation to your next provider. Your primary care clinician is listed as [place]. If you have any questions after today's visit, please call [phone].

## 2017-10-16 NOTE — PATIENT INSTRUCTIONS
Medicare Wellness Visit, Male    The best way to live healthy is to have a healthy lifestyle by eating a well-balanced diet, exercising regularly, limiting alcohol and stopping smoking. Regular physical exams and screening tests are another way to keep healthy. Preventive exams provided by your health care provider can find health problems before they become diseases or illnesses. Preventive services including immunizations, screening tests, monitoring and exams can help you take care of your own health. All people over age 72 should have a pneumovax  and and a prevnar shot to prevent pneumonia. These are once in a lifetime unless you and your provider decide differently. All people over 65 should have a yearly flu shot and a tetanus vaccine every 10 years. Screening for diabetes mellitus with a blood sugar test should be done every year. Glaucoma is a disease of the eye due to increased ocular pressure that can lead to blindness and it should be done every year by an eye professional.    Cardiovascular screening tests that check for elevated lipids (fatty part of blood) which can lead to heart disease and strokes should be done every 5 years. Colorectal screening that evaluates for blood or polyps in your colon should be done yearly as a stool test or every five years as a flexible sigmoidoscope or every 10 years as a colonoscopy up to age 76. Men up to age 76 may need a screening blood test for prostate cancer at certain intervals, depending on their personal and family history. This decision is between the patient and his provider. If you have been a smoker or had family history of abdominal aortic aneurysms, you and your provider may decide to schedule an ultrasound test of your aorta. Hepatitis C screening is also recommended for anyone born between [birth year] through [address]. A shingles vaccine is also recommended once in a lifetime after age 61.     Your Medicare Wellness Exam is recommended annually. Here is a list of your current Health Maintenance items with a due date:  Health Maintenance Due   Topic Date Due    Diabetic Foot Care  12/10/1965          Well Visit, Men 48 to 72: Care Instructions  Your Care Instructions  Physical exams can help you stay healthy. Your doctor has checked your overall health and may have suggested ways to take good care of yourself. He or she also may have recommended tests. At home, you can help prevent illness with healthy eating, regular exercise, and other steps. Follow-up care is a key part of your treatment and safety. Be sure to make and go to all appointments, and call your doctor if you are having problems. It's also a good idea to know your test results and keep a list of the medicines you take. How can you care for yourself at home? · Reach and stay at a healthy weight. This will lower your risk for many problems, such as obesity, diabetes, heart disease, and high blood pressure. · Get at least 30 minutes of exercise on most days of the week. Walking is a good choice. You also may want to do other activities, such as running, swimming, cycling, or playing tennis or team sports. · Do not smoke. Smoking can make health problems worse. If you need help quitting, talk to your doctor about stop-smoking programs and medicines. These can increase your chances of quitting for good. · Protect your skin from too much sun. When you're outdoors from 10 a.m. to 4 p.m., stay in the shade or cover up with clothing and a hat with a wide brim. Wear sunglasses that block UV rays. Even when it's cloudy, put broad-spectrum sunscreen (SPF 30 or higher) on any exposed skin. · See a dentist one or two times a year for checkups and to have your teeth cleaned. · Wear a seat belt in the car. · Limit alcohol to 2 drinks a day. Too much alcohol can cause health problems. Follow your doctor's advice about when to have certain tests.  These tests can spot problems early.  · Cholesterol. Your doctor will tell you how often to have this done based on your overall health and other things that can increase your risk for heart attack and stroke. · Blood pressure. Have your blood pressure checked during a routine doctor visit. Your doctor will tell you how often to check your blood pressure based on your age, your blood pressure results, and other factors. · Prostate exam. Talk to your doctor about whether you should have a blood test (called a PSA test) for prostate cancer. Experts disagree on whether men should have this test. Some experts recommend that you discuss the benefits and risks of the test with your doctor. · Diabetes. Ask your doctor whether you should have tests for diabetes. · Vision. Some experts recommend that you have yearly exams for glaucoma and other age-related eye problems starting at age 48. · Hearing. Tell your doctor if you notice any change in your hearing. You can have tests to find out how well you hear. · Colon cancer. You should begin tests for colon cancer at age 48. You may have one of several tests. Your doctor will tell you how often to have tests based on your age and risk. Risks include whether you already had a precancerous polyp removed from your colon or whether your parent, brother, sister, or child has had colon cancer. · Heart attack and stroke risk. At least every 4 to 6 years, you should have your risk for heart attack and stroke assessed. Your doctor uses factors such as your age, blood pressure, cholesterol, and whether you smoke or have diabetes to show what your risk for a heart attack or stroke is over the next 10 years. · Abdominal aortic aneurysm. Ask your doctor whether you should have a test to check for an aneurysm. You may need a test if you ever smoked or if your parent, brother, sister, or child has had an aneurysm. When should you call for help?   Watch closely for changes in your health, and be sure to contact your doctor if you have any problems or symptoms that concern you. Where can you learn more? Go to http://miah-lucy.info/. Enter I776 in the search box to learn more about \"Well Visit, Men 48 to 72: Care Instructions. \"  Current as of: July 19, 2016  Content Version: 11.3  © 2651-1208 [employer]. Care instructions adapted under license by [employer] (which disclaims liability or warranty for this information). If you have questions about a medical condition or this instruction, always ask your healthcare professional. [address] any warranty or liability for your use of this information.     Follow up in 6 months for routine care and POC A1C in office

## 2017-10-16 NOTE — PROGRESS NOTES
SUBJECTIVE  Chief Complaint   Patient presents with   \[facility]\"" Annual Wellness Visit      Here for routine follow-up. He has had recent labs but was a no-show by accident to his follow-up a few weeks ago. Overall he is doing well. No complaints besides ongoing msk chest complaints since CABG. He is complaint with his glyburide. Has had a recent diabetic eye exam.  Does not report any polyuria or polydipsia. He is currently doing well from a cardiac standpoint without any cardiac chest pain or dyspnea. He has been compliant with Plavix. He is tolerating a low dose statin without myalgias. He is seeing cardiology in early November for what looks like cardiac testing. He has sleep apnea and sometimes wears CPAP. OBJECTIVE    Blood pressure 100/68, pulse 71, temperature 98.5 °F (36.9 °C), temperature source Oral, resp. rate 14, height 5' 3\" (1.6 m), weight 181 lb (82.1 kg), SpO2 98 %. General:  alert, cooperative, well appearing, in no apparent distress. Heart: Normal S1S2, RRR. Chest: Clear, no w/r/r. Skin:  No pedal lesions. Neuro:  Monofilament testing is intact. Foot exam  was performed. Sensory and motor testing was assessed - intact. Pedal pulse(s) was assessed - intact. No pedal lesions. Psych: normal affect. Mood good. Oriented x 3. Judgement and insight intact. Results for Rohan Dejesus (MRN [id number]) as of 10/16/2017 09:39   Ref.  Range 8/16/2017 11:06   WBC Latest Ref Range: 3.4 - 10.8 x10E3/uL 8.6   RBC Latest Ref Range: 4.14 - 5.80 x10E6/uL 4.89   HGB Latest Ref Range: 12.6 - 17.7 g/dL 13.4   HCT Latest Ref Range: 37.5 - 51.0 % 41.1   MCV Latest Ref Range: 79 - 97 fL 84   MCH Latest Ref Range: 26.6 - 33.0 pg 27.4   MCHC Latest Ref Range: 31.5 - 35.7 g/dL 32.6   RDW Latest Ref Range: 12.3 - 15.4 % 14.8   PLATELET Latest Ref Range: 150 - 379 x10E3/uL 207   Sodium Latest Ref Range: 134 - 144 mmol/L 141   Potassium Latest Ref Range: 3.5 - 5.2 mmol/L 4.6 Chloride Latest Ref Range: 96 - 106 mmol/L 101   CO2 Latest Ref Range: 18 - 29 mmol/L 22   Glucose Latest Ref Range: 65 - 99 mg/dL 102 (H)   BUN Latest Ref Range: 8 - 27 mg/dL 21   Creatinine Latest Ref Range: 0.76 - 1.27 mg/dL 1.19   BUN/Creatinine ratio Latest Ref Range: 10 - 24  18   Calcium Latest Ref Range: 8.6 - 10.2 mg/dL 9.2   GFR est non-AA Latest Ref Range: >59 mL/min/1.73 66   GFR est AA Latest Ref Range: >59 mL/min/1.73 76   Bilirubin, total Latest Ref Range: 0.0 - 1.2 mg/dL 0.4   Protein, total Latest Ref Range: 6.0 - 8.5 g/dL 7.0   Albumin Latest Ref Range: 3.6 - 4.8 g/dL 4.5   A-G Ratio Latest Ref Range: 1.2 - 2.2  1.8   ALT (SGPT) Latest Ref Range: 0 - 44 IU/L 12   AST Latest Ref Range: 0 - 40 IU/L 14   Alk. phosphatase Latest Ref Range: 39 - 117 IU/L 37 (L)   Triglyceride Latest Ref Range: 0 - 149 mg/dL 234 (H)   Cholesterol, total Latest Ref Range: 100 - 199 mg/dL 166   HDL Cholesterol Latest Ref Range: >39 mg/dL 31 (L)   LDL, calculated Latest Ref Range: 0 - 99 mg/dL 88   VLDL, calculated Latest Ref Range: 5 - 40 mg/dL 47 (H)   Hemoglobin A1c, (calculated) Latest Ref Range: 4.8 - 5.6 % 6.2 (H)   Creatinine, urine Latest Ref Range: Not Estab. mg/dL 157.2   Microalbumin, urine Latest Ref Range: Not Estab. ug/mL 6.4   Prostate Specific Ag Latest Ref Range: 0.0 - 4.0 ng/mL 1.6     ASSESSMENT / PLAN    ICD-10-CM ICD-9-CM    1. Controlled type 2 diabetes mellitus without complication, without long-term current use of insulin ([facility]) E11.9 250.00    2. Chronic systolic HF (heart failure) ([facility]) I50.22 428.22    3. Hypertensive heart disease with congestive heart failure ([facility]) I11.0 402.91      428.0    4. CAD S/P percutaneous coronary angioplasty I25.10 414.01     Z98.61 V45.82    5. Chest pain, musculoskeletal R07.89 786.59    6. S/P CABG x 4 Z95.1 V45.81    7. Dyslipidemia E78.5 272.4    8. Severe obstructive sleep apnea G47.33 327.23    9. Obesity (BMI 30-39. 9) E66.9 278.00      Reviewed labs. Diabetes -  I have counseled him on diet lower in carbs. Continue glyburide. A1c is showing adequate control. Advised regular foot checks and annual eye exams. CAD / chronic CHF (compensated) / hypertensive heart disease / dyslipidemia / chest pain (msk) s/p CABG - under care of cardiology. Continue current care. Refills of meds as needed. Sleep apnea - he is on CPAP. Obesity - Diabetic dieting. 25 minutes of face to face time spent with the patient with at least 50% on counseling on above medical issues with respect to med compliance, screening tests, and prevention. All chart history elements were reviewed by me at the time of the visit even though marked at time of note closure. Patient understands our medical plan. Patient has provided input and agrees with goals. Alternatives have been explained and offered. All questions answered. The patient is to call if condition worsens or fails to improve.      Follow-up Disposition:  Return in about 1 year (around 10/16/2018) for Medicare Wellness exam.

## 2017-10-16 NOTE — PROGRESS NOTES
This is an Initial Medicare Annual Wellness Exam (AWV) (Performed 12 months after IPPE or effective date of Medicare Part B enrollment, Once in a lifetime)    I have reviewed the patient's medical history in detail and updated the computerized patient record. History     Past Medical History:   Diagnosis Date    BPPV (benign paroxysmal positional vertigo) 5/26/2010    CABG x 4     CAD (coronary artery disease), native coronary artery     cath (jan 2014): 40% LM, pLAD 85%, dLAD 70%, dLCx 80%, mRCA 95%. s/p CABG x 4 (LIMA->D, SVG->dLAD, Seq SVG -> RPDA & OM2)    Cardiac catheterization 01/10/2014    pLM 40%. pLAD 85%. dCX 80%. mRCA 95%. CABG recommended.  Cardiac echocardiogram 09/03/2014    Tech difficult. EF 40-45%. Mild, diffuse hypk. Mild LVH. Indeterminate diastolic fx. Mild LAE. No significant valvular heart disease.  Carotid duplex 01/13/2014    Mod 50-69% VALENTIN stenosis. Mild 4-77% LICA stenosis. Occluded right vertebral.       Carpal tunnel syndrome     Cerebrovascular disease     Moderate VALENTIN, mild LICA, occluded R vertebral (Doppler Jan 2014)    Chest pain, musculoskeletal     post CABG    Diabetes ([address]     Dyslipidemia     H/O tobacco use, presenting hazards to health     quit 2014    Headache(784.0)     cluster    Heart attack     Hiatal hernia     Hypercholesterolemia     Hypertension     Hypertensive heart disease with congestive heart failure ([address]     Internal carotid artery stenosis 5/26/2010    Obesity (BMI 30-39. 9)     S/P colonoscopic polypectomy 09/2017    tubular adenoma, f/u 5 years    Sleep apnea     does not use cpap machine    Stroke ([address] 5/22/09    TIA (no residuals)    Systolic HF (heart failure) (HCC)     LV EF 35% (echo Jan 2014).  Mean LA pressure 32 (Jan 2014)    Vertigo       Past Surgical History:   Procedure Laterality Date    COLONOSCOPY N/A 9/7/2017    COLONOSCOPY with polypectomy performed by Keaton Schaefer MD at [facility]  HX CORONARY ARTERY BYPASS GRAFT  1/14/14    LIMA to the D1, and GSV to the distal LAD, and anotherGSV sequentially to the PDA of the RCA and to theOM2 and ascending aortic endarterectomy, Dr. Ester Topete, 1/14/14.  HX CORONARY STENT PLACEMENT      HX UROLOGICAL      repair of spermatocele     Current Outpatient Prescriptions   Medication Sig Dispense Refill    aspirin delayed-release 81 mg tablet Take 81 mg by mouth daily.  clopidogrel (PLAVIX) 75 mg tab Take 75 mg by mouth daily.  furosemide (LASIX) 40 mg tablet TAKE ONE TABLET BY MOUTH ONCE DAILY with potassium chloride 90 Tab 3    potassium chloride (KLOR-CON M10) 10 mEq tablet TAKE ONE TABLET BY MOUTH ONCE DAILY with Lasix 90 Tab 3    lisinopril (PRINIVIL, ZESTRIL) 5 mg tablet TAKE ONE TABLET BY MOUTH ONCE DAILY 30 Tab 11    carvedilol (COREG) 12.5 mg tablet TAKE ONE TABLET BY MOUTH TWICE DAILY WITH MEALS 60 Tab 11    glyBURIDE (DIABETA) 2.5 mg tablet TAKE ONE TABLET BY MOUTH ONCE DAILY BEFORE  BREAKFAST 30 Tab 4    rosuvastatin (CRESTOR) 10 mg tablet Take 1 Tab by mouth nightly. 30 Tab 6    nitroglycerin (NITROSTAT) 0.4 mg SL tablet 1 Tab by SubLINGual route every five (5) minutes as needed for Chest Pain. 1 Bottle 0    cpap machine kit by Does Not Apply route. Overnight CPAP at 16 CWP with ramp and humidifier. Mask: Simplus FF small or mask of choice. Supplies 99 month. Please send compliance data [id number]. Diagnosis DAYANA.  AHI 52 RDI 52 1 Kit 0     Allergies   Allergen Reactions    Atorvastatin Myalgia     High dose    Peanut Diarrhea and Nausea and Vomiting    Acetaminophen Other (comments)     hallucinations    Dimethicone Rash    Percocet [Oxycodone-Acetaminophen] Other (comments)     hallucinations     Family History   Problem Relation Age of Onset    Cancer Father      prostate    Heart Disease Father     Heart Disease Brother      age 48   [facility] Other Son      Brain damage    Heart Attack Brother      Social History   Substance Use Topics    Smoking status: Former Smoker     Packs/day: 0.25     Years: 10.00     Types: Cigarettes     Quit date: 1/9/2014    Smokeless tobacco: Never Used      Comment:  less then 5 ciggerates a week    Alcohol use Yes      Comment: beer occasionally     Patient Active Problem List   Diagnosis Code    S/P CABG x 4 Z95.1    Cerebrovascular disease I67.9    Dyslipidemia E78.5    CAD S/P percutaneous coronary angioplasty I25.10, Z98.61    Chronic systolic HF (heart failure) ([facility]) I50.22    Hypertensive heart disease with congestive heart failure ([facility] I11.0    Chest pain, musculoskeletal R07.89    Obesity (BMI 30-39. 9) E66.9    H/O tobacco use, presenting hazards to health Z87.891    Carpal tunnel syndrome on both sides G56.03    Severe obstructive sleep apnea G47.33    Advance care planning Z71.89       Depression Risk Factor Screening:     PHQ over the last two weeks 10/16/2017   Little interest or pleasure in doing things Not at all   Feeling down, depressed or hopeless Not at all   Total Score PHQ 2 0     Alcohol Risk Factor Screening: You do not drink alcohol or very rarely. Functional Ability and Level of Safety:     Hearing Loss  Hearing is good. Activities of Daily Living  The home contains: no safety equipment. Patient does total self care    Fall RiskFall Risk Assessment, last 12 mths 10/16/2017   Able to walk? Yes   Fall in past 12 months?  No       Abuse Screen  Patient is not abused    Cognitive Screening   Evaluation of Cognitive Function:  Has your family/caregiver stated any concerns about your memory: no  Normal    Patient Care Team   Patient Care Team:  Josefina Colvin MD as PCP - General (Family Practice)  Yesy Mckeon MD (Inactive) (Orthopedic Surgery)  Cameron Fuentes MD (Neurology)  Silvano Nicole MD (Pulmonary Disease)  Leydi Meehan MD (Ophthalmology)  Iona Castillo MD (Cardiology)    Assessment/Plan   Education and counseling provided:  Are appropriate based on today's review and evaluation      ICD-10-CM ICD-9-CM    1. Initial Medicare annual wellness visit Z00.00 V70.0    2. Advanced directives, counseling/discussion Z71.89 V65.49 ADVANCE CARE PLANNING FIRST 30 MINS   3. Screening for depression Z13.89 V79.0 DEPRESSION SCREEN ANNUAL     Age and sex specific counseling. Screening for depression and alcoholism. Advanced directives / end of life planning discussion - see ACP note. Care team updated. Declines vaccines. Medicare recommended screening and prevention test guidelines are filled out, reviewed, and provided to patient. Scanned to chart as well. Screening and prevention is up to date. Patient understands our medical plan. Patient has provided input and agrees with goals. All questions answered.

## 2017-10-16 NOTE — PATIENT INSTRUCTIONS

## 2017-10-16 NOTE — ACP (ADVANCE CARE PLANNING)
Advance Care Planning    Advance Care Planning (ACP) Provider Note - Comprehensive     Date of ACP Conversation: 10/16/17  Persons included in Conversation:  patient  Length of ACP Conversation in minutes:  16 minutes    Authorized Decision Maker (if patient is incapable of making informed decisions): This person is:  He mentions a daughter that he is close to that would be likely his healthcare POA.           General ACP for ALL Patients with Decision Making Capacity:   Importance of advance care planning, including choosing a healthcare agent to communicate patient's healthcare decisions if patient lost the ability to make decisions, such as after a sudden illness or accident  Understanding of the healthcare agent role was assessed and information provided    Review of Existing Advance Directive:  None currently exists    For Serious or Chronic Illness:  No known illness    Interventions Provided:  Referral made for ACP follow-up assistance to:  nurse  Recommended review of completed ACP document annually or upon change in health status

## 2017-10-16 NOTE — MR AVS SNAPSHOT
Visit Information Date & Time Provider Department Dept. Phone Encounter #  
 10/16/2017  8:15 AM Mahsa Carmen, [address] [id number] Follow-up Instructions Return in about 6 months (around 4/16/2018) for routine care. A1c to be done in office. Your Appointments 11/8/2017  8:40 AM  
Follow Up with Raven Menjivar MD  
[facility] ([facility]) Appt Note: 6 month f/up [place] [address] [address] [address]  
[phone] [facility] [place] [address] Upcoming Health Maintenance Date Due  
 FOOT EXAM Q1 12/10/1965 HEMOGLOBIN A1C Q6M 2/16/2018 EYE EXAM RETINAL OR DILATED Q1 2/16/2018 MICROALBUMIN Q1 8/16/2018 LIPID PANEL Q1 8/16/2018 COLONOSCOPY 9/7/2022 DTaP/Tdap/Td series (2 - Td) 10/16/2027 Allergies as of 10/16/2017  Review Complete On: 10/16/2017 By: Rita Loomis LPN Severity Noted Reaction Type Reactions Atorvastatin Medium 11/26/2014   Side Effect Myalgia High dose Peanut Medium 01/15/2014    Diarrhea, Nausea and Vomiting Acetaminophen  01/06/2017    Other (comments)  
 hallucinations Dimethicone  01/30/2015    Rash Percocet [Oxycodone-acetaminophen]  01/20/2014   Side Effect Other (comments)  
 hallucinations Current Immunizations  Reviewed on 3/27/2017 Name Date Influenza Vaccine 9/18/2014 Pneumococcal Polysaccharide (PPSV-23) 9/18/2014 Not reviewed this visit You Were Diagnosed With   
  
 Codes Comments Initial Medicare annual wellness visit    -  Primary ICD-10-CM: Z00.00 ICD-9-CM: V70.0 Advanced directives, counseling/discussion     ICD-10-CM: Z71.89 ICD-9-CM: V65.49 Screening for depression     ICD-10-CM: Z13.89 ICD-9-CM: V79.0 Vitals BP Pulse Temp Resp Height(growth percentile) Weight(growth percentile) 100/68 (BP 1 Location: Left arm, BP Patient Position: Sitting) 71 98.5 °F (36.9 °C) (Oral) 14 5' 3\" (1.6 m) 181 lb (82.1 kg) SpO2 BMI Smoking Status 98% 32.06 kg/m2 Former Smoker Vitals History BMI and BSA Data Body Mass Index Body Surface Area 32.06 kg/m 2 1.91 m 2 Preferred Pharmacy Pharmacy Name Phone [facility] [address] [phone] Your Updated Medication List  
  
   
This list is accurate as of: 10/16/17  9:00 AM.  Always use your most recent med list.  
  
  
  
  
 aspirin delayed-release 81 mg tablet Take 81 mg by mouth daily. carvedilol 12.5 mg tablet Commonly known as:  COREG  
TAKE ONE TABLET BY MOUTH TWICE DAILY WITH MEALS  
  
 clopidogrel 75 mg Tab Commonly known as:  PLAVIX Take 75 mg by mouth daily. cpap machine kit  
by Does Not Apply route. Overnight CPAP at 16 CWP with ramp and humidifier. Mask: Simplus FF small or mask of choice. Supplies 99 month. Please send compliance data [id number]. Diagnosis DAYANA. AHI 52 RDI 52  
  
 furosemide 40 mg tablet Commonly known as:  LASIX TAKE ONE TABLET BY MOUTH ONCE DAILY with potassium chloride  
  
 glyBURIDE 2.5 mg tablet Commonly known as:  Rosy Ohms TAKE ONE TABLET BY MOUTH ONCE DAILY BEFORE  BREAKFAST  
  
 lisinopril 5 mg tablet Commonly known as:  PRINIVIL, ZESTRIL  
TAKE ONE TABLET BY MOUTH ONCE DAILY  
  
 nitroglycerin 0.4 mg SL tablet Commonly known as:  NITROSTAT  
1 Tab by SubLINGual route every five (5) minutes as needed for Chest Pain.  
  
 potassium chloride 10 mEq tablet Commonly known as:  KLOR-CON M10  
TAKE ONE TABLET BY MOUTH ONCE DAILY with Lasix  
  
 rosuvastatin 10 mg tablet Commonly known as:  CRESTOR Take 1 Tab by mouth nightly. We Performed the Following ADVANCE CARE PLANNING FIRST 30 MINS [42969 CPT(R)] [facility] [[id number] [facility]] Follow-up Instructions Return in about 6 months (around 4/16/2018) for routine care. A1c to be done in office. To-Do List   
 11/02/2017 8:30 AM  
  Appointment with [facility] at [facility] ([phone]) This is a 2-part test which takes approximately 4 hours to complete. Please see part 2 of exam below for full instructions 11/02/2017 9:00 AM  
  Appointment with [facility] ROOM at [facility] ([phone]) 1-No eating or coffee after midnight  2-Do not take diabetic meds (bring with) 3-Please take all other meds unless specified by cardiology Patient Instructions Medicare Wellness Visit, Male The best way to live healthy is to have a healthy lifestyle by eating a well-balanced diet, exercising regularly, limiting alcohol and stopping smoking. Regular physical exams and screening tests are another way to keep healthy. Preventive exams provided by your health care provider can find health problems before they become diseases or illnesses. Preventive services including immunizations, screening tests, monitoring and exams can help you take care of your own health. All people over age 72 should have a pneumovax  and and a prevnar shot to prevent pneumonia. These are once in a lifetime unless you and your provider decide differently. All people over 65 should have a yearly flu shot and a tetanus vaccine every 10 years. Screening for diabetes mellitus with a blood sugar test should be done every year. Glaucoma is a disease of the eye due to increased ocular pressure that can lead to blindness and it should be done every year by an eye professional. 
 
Cardiovascular screening tests that check for elevated lipids (fatty part of blood) which can lead to heart disease and strokes should be done every 5 years.  
 
Colorectal screening that evaluates for blood or polyps in your colon should be done yearly as a stool test or every five years as a flexible sigmoidoscope or every 10 years as a colonoscopy up to age 76. Men up to age 76 may need a screening blood test for prostate cancer at certain intervals, depending on their personal and family history. This decision is between the patient and his provider. If you have been a smoker or had family history of abdominal aortic aneurysms, you and your provider may decide to schedule an ultrasound test of your aorta. Hepatitis C screening is also recommended for anyone born between [birth year] through [address]. A shingles vaccine is also recommended once in a lifetime after age 61. Your Medicare Wellness Exam is recommended annually. Here is a list of your current Health Maintenance items with a due date: 
Health Maintenance Due Topic Date Due  
 Diabetic Foot Care  12/10/1965 Well Visit, Men 48 to 72: Care Instructions Your Care Instructions Physical exams can help you stay healthy. Your doctor has checked your overall health and may have suggested ways to take good care of yourself. He or she also may have recommended tests. At home, you can help prevent illness with healthy eating, regular exercise, and other steps. Follow-up care is a key part of your treatment and safety. Be sure to make and go to all appointments, and call your doctor if you are having problems. It's also a good idea to know your test results and keep a list of the medicines you take. How can you care for yourself at home? · Reach and stay at a healthy weight. This will lower your risk for many problems, such as obesity, diabetes, heart disease, and high blood pressure. · Get at least 30 minutes of exercise on most days of the week. Walking is a good choice. You also may want to do other activities, such as running, swimming, cycling, or playing tennis or team sports. · Do not smoke. Smoking can make health problems worse.  If you need help quitting, talk to your doctor about stop-smoking programs and medicines. These can increase your chances of quitting for good. · Protect your skin from too much sun. When you're outdoors from 10 a.m. to 4 p.m., stay in the shade or cover up with clothing and a hat with a wide brim. Wear sunglasses that block UV rays. Even when it's cloudy, put broad-spectrum sunscreen (SPF 30 or higher) on any exposed skin. · See a dentist one or two times a year for checkups and to have your teeth cleaned. · Wear a seat belt in the car. · Limit alcohol to 2 drinks a day. Too much alcohol can cause health problems. Follow your doctor's advice about when to have certain tests. These tests can spot problems early. · Cholesterol. Your doctor will tell you how often to have this done based on your overall health and other things that can increase your risk for heart attack and stroke. · Blood pressure. Have your blood pressure checked during a routine doctor visit. Your doctor will tell you how often to check your blood pressure based on your age, your blood pressure results, and other factors. · Prostate exam. Talk to your doctor about whether you should have a blood test (called a PSA test) for prostate cancer. Experts disagree on whether men should have this test. Some experts recommend that you discuss the benefits and risks of the test with your doctor. · Diabetes. Ask your doctor whether you should have tests for diabetes. · Vision. Some experts recommend that you have yearly exams for glaucoma and other age-related eye problems starting at age 48. · Hearing. Tell your doctor if you notice any change in your hearing. You can have tests to find out how well you hear. · Colon cancer. You should begin tests for colon cancer at age 48. You may have one of several tests. Your doctor will tell you how often to have tests based on your age and risk.  Risks include whether you already had a precancerous polyp removed from your colon or whether your parent, brother, sister, or child has had colon cancer. · Heart attack and stroke risk. At least every 4 to 6 years, you should have your risk for heart attack and stroke assessed. Your doctor uses factors such as your age, blood pressure, cholesterol, and whether you smoke or have diabetes to show what your risk for a heart attack or stroke is over the next 10 years. · Abdominal aortic aneurysm. Ask your doctor whether you should have a test to check for an aneurysm. You may need a test if you ever smoked or if your parent, brother, sister, or child has had an aneurysm. When should you call for help? Watch closely for changes in your health, and be sure to contact your doctor if you have any problems or symptoms that concern you. Where can you learn more? Go to http://miah-lucy.info/. Enter E983 in the search box to learn more about \"Well Visit, Men 48 to 72: Care Instructions. \" Current as of: July 19, 2016 Content Version: 11.3 © 1202-9197 [employer]. Care instructions adapted under license by [employer] (which disclaims liability or warranty for this information). If you have questions about a medical condition or this instruction, always ask your healthcare professional. [address] any warranty or liability for your use of this information. Follow up in 6 months for routine care and POC A1C in office Please provide this summary of care documentation to your next provider. Your primary care clinician is listed as Aric Mckeon. If you have any questions after today's visit, please call [phone].

## 2017-10-16 NOTE — PROGRESS NOTES
Chief Complaint   Patient presents with    Diabetes    Cholesterol Problem    Coronary Artery Disease     1. Have you been to the ER, urgent care clinic since your last visit? Hospitalized since your last visit? Yes, [facility] for endoscopy and colonoscopy with Dr. Camila Coles    2. Have you seen or consulted any other health care providers outside of the [address] [place] [address] since your last visit? Include any pap smears or colon screening.  No    TDAP: unknown patient declined  Zostavax: declined  Influenza vaccine: declined

## 2017-10-16 NOTE — PROGRESS NOTES
Chief Complaint   Patient presents with   [facility] Annual Wellness Visit     1. Have you been to the ER, urgent care clinic since your last visit? Hospitalized since your last visit? Yes, [facility] for endoscopy and colonoscopy with Dr. Shane Wall    2. Have you seen or consulted any other health care providers outside of the [address] [place] [address] since your last visit? Include any pap smears or colon screening. No     PHQ over the last two weeks 10/16/2017   Little interest or pleasure in doing things Not at all   Feeling down, depressed or hopeless Not at all   Total Score PHQ 2 0     Abuse Screening Questionnaire 10/16/2017   Do you ever feel afraid of your partner? N   Are you in a relationship with someone who physically or mentally threatens you? N   Is it safe for you to go home? Y     Fall Risk Assessment, last 12 mths 10/16/2017   Able to walk? Yes   Fall in past 12 months?  No

## 2017-10-25 RX ORDER — GLYBURIDE 2.5 MG/1
TABLET ORAL
Qty: 30 TAB | Refills: 5 | Status: SHIPPED | OUTPATIENT
Start: 2017-10-25 | End: 2018-02-19 | Stop reason: SDUPTHER

## 2017-11-07 DIAGNOSIS — I25.10 CAD S/P PERCUTANEOUS CORONARY ANGIOPLASTY: Primary | ICD-10-CM

## 2017-11-07 DIAGNOSIS — Z98.61 CAD S/P PERCUTANEOUS CORONARY ANGIOPLASTY: Primary | ICD-10-CM

## 2017-11-13 ENCOUNTER — HOSPITAL ENCOUNTER (OUTPATIENT)
Dept: NON INVASIVE DIAGNOSTICS | Age: 62
Discharge: HOME | End: 2017-11-13
Attending: INTERNAL MEDICINE
Payer: MEDICARE

## 2017-11-13 DIAGNOSIS — Z98.61 CAD S/P PERCUTANEOUS CORONARY ANGIOPLASTY: ICD-10-CM

## 2017-11-13 DIAGNOSIS — I25.10 CAD S/P PERCUTANEOUS CORONARY ANGIOPLASTY: ICD-10-CM

## 2017-11-13 LAB
ATTENDING PHYSICIAN, CST07: NORMAL
DIAGNOSIS, 93000: NORMAL
DUKE TM SCORE RESULT, CST14: NORMAL
DUKE TREADMILL SCORE, CST13: NORMAL
ECG INTERP BEFORE EX, CST11: NORMAL
ECG INTERP DURING EX, CST12: NORMAL
FUNCTIONAL CAPACITY, CST17: NORMAL
KNOWN CARDIAC CONDITION, CST08: NORMAL
MAX. DIASTOLIC BP, CST04: 60 MMHG
MAX. HEART RATE, CST05: 113 BPM
MAX. SYSTOLIC BP, CST03: 130 MMHG
OVERALL BP RESPONSE TO EXERCISE, CST16: NORMAL
OVERALL HR RESPONSE TO EXERCISE, CST15: NORMAL
PEAK EX METS, CST10: 1.5 METS
PROTOCOL NAME, CST01: NORMAL
TEST INDICATION, CST09: NORMAL

## 2017-11-13 PROCEDURE — A9500 TC99M SESTAMIBI: HCPCS

## 2017-11-13 PROCEDURE — 74011000258 HC RX REV CODE- 258: Performed by: INTERNAL MEDICINE

## 2017-11-13 PROCEDURE — 78452 HT MUSCLE IMAGE SPECT MULT: CPT | Performed by: INTERNAL MEDICINE

## 2017-11-13 PROCEDURE — 93017 CV STRESS TEST TRACING ONLY: CPT | Performed by: INTERNAL MEDICINE

## 2017-11-13 PROCEDURE — 74011250636 HC RX REV CODE- 250/636: Performed by: INTERNAL MEDICINE

## 2017-11-13 RX ORDER — SODIUM CHLORIDE 9 MG/ML
100 INJECTION, SOLUTION INTRAVENOUS ONCE
Status: COMPLETED | OUTPATIENT
Start: 2017-11-13 | End: 2017-11-13

## 2017-11-13 RX ADMIN — SODIUM CHLORIDE 100 ML/HR: 900 INJECTION, SOLUTION INTRAVENOUS at 09:10

## 2017-11-13 RX ADMIN — REGADENOSON 0.4 MG: 0.08 INJECTION, SOLUTION INTRAVENOUS at 09:10

## 2017-11-13 NOTE — PROGRESS NOTES
Patient was given 10.99 milliCuries of 99mTc-Sestamibi for the resting images. Patient was also given 33.0 milliCuries of 99mTc-Sestamibi for the stress images. Injected 0.4mg Lexiscan while slowly walking on treadmill. Patient's armband was discarded and shredded.

## 2017-11-13 NOTE — PROCEDURES
[address] STRESS    Name:  Abraham Bruno  MR#:  [id number]  :  1955  Account #:  [de-identified]  Date of Adm:  2017  Date of Service:  2017      PHARMACOLOGICAL NUCLEAR STRESS TEST/GATED SPECT  IMAGING    DATE OF STUDY: 2017    REFERRING PHYSICIAN: Dr. Nathan Carey. INDICATIONS: Coronary artery disease, history of prior bypass  surgery and stenting of bypass grafts. Baseline heart rate 74, blood  pressure 124/70. Resting EKG shows sinus rhythm with lateral ST-T  abnormalities, cannot exclude ischemia. PHARMACOLOGICAL STRESS PORTION: The patient underwent  Lexiscan infusion 0.4 mg intravenously per protocol via the left AC IV and  then did a low level 3-minute walk. The patient remained in sinus  rhythm with a baseline lateral ST segment depression, making this a  nondiagnostic EKG portion of the stress test.    PERFUSION IMAGING: The patient received intravenous technetium-  99m sestamibi 11.0 mCi intravenously per protocol via the left AC IV for  resting images and then 33.0 mCi via the same IV an hour and 15  minutes later for stress imaging. Tomographic views of the left ventricle  were obtained and compared. Cavity size was normal, as well as rest  and stress images. There was no transient ischemic dilatation present. There was a small to medium-sized, fairly dense, but fixed perfusion  imaging abnormality involving the anterolateral wall, primarily the mid  and basal portion, which usually represents an area of infarction. There  were no reversible perfusion imaging abnormalities concerning for  myocardial ischemia. On gated analysis, left ventricular end diastolic  volume was calculated to be normal, overall systolic function was mild  to moderately depressed with ejection fraction calculated at 40% with  anterolateral hypokinesis and septal wall dyskinesis. CONCLUSIONS  1.  Abnormal and intermediate risk pharmacological nuclear stress test.  2. Small to medium sized, dense, fixed anterolateral perfusion defect  consistent with prior myocardial infarction. 3. No reversible perfusion image abnormalities significant for  myocardial ischemia. 4. Normal left ventricular size, mildly to moderately depressed left  ventricular systolic function, ejection fraction calculated at 40%.         MD Otf Jim / XWX  D:  11/13/2017   12:35  T:  11/13/2017   16:19  Job #:  [id number]

## 2017-11-15 ENCOUNTER — TELEPHONE (OUTPATIENT)
Dept: CARDIOLOGY CLINIC | Age: 62
End: 2017-11-15

## 2017-11-15 NOTE — TELEPHONE ENCOUNTER
----- Message from Josué Davis MD sent at 11/15/2017 10:22 AM EST -----  Let him know it looks unchanged but Dr. Claudeen Alberts will go over with him at his next appt.

## 2017-11-25 DIAGNOSIS — E78.00 HYPERCHOLESTEROLEMIA: ICD-10-CM

## 2017-11-27 RX ORDER — ROSUVASTATIN CALCIUM 10 MG/1
TABLET, COATED ORAL
Qty: 90 TAB | Refills: 3 | Status: SHIPPED | OUTPATIENT
Start: 2017-11-27 | End: 2018-04-23 | Stop reason: SDUPTHER

## 2018-02-19 RX ORDER — GLYBURIDE 2.5 MG/1
2.5 TABLET ORAL DAILY
Qty: 90 TAB | Refills: 0 | Status: SHIPPED | OUTPATIENT
Start: 2018-02-19 | End: 2018-08-18 | Stop reason: SDUPTHER

## 2018-02-19 NOTE — TELEPHONE ENCOUNTER
This pharmacy faxed over request for the following prescriptions to be filled: REQUESTING A 90 DAY SUPPLY     Medication requested :   Requested Prescriptions     Pending Prescriptions Disp Refills    glyBURIDE (DIABETA) 2.5 mg tablet 30 Tab 5     PCP: Susie Encarnacion 39. or Print: [facility]  Mail order or Local pharmacy [address]     Scheduled appointment if not seen by current providers in office: LOV 10/16/2017 f/u 4/16/2018

## 2018-02-20 NOTE — TELEPHONE ENCOUNTER
Attempted to contact patient regarding medication refill being sent to pharmacy. No answer [phone] and voicemail not set up.

## 2018-02-27 RX ORDER — LISINOPRIL 5 MG/1
TABLET ORAL
Qty: 30 TAB | Refills: 11 | Status: SHIPPED | OUTPATIENT
Start: 2018-02-27 | End: 2019-09-16 | Stop reason: SDUPTHER

## 2018-03-12 RX ORDER — CLOPIDOGREL BISULFATE 75 MG/1
TABLET ORAL
Qty: 90 TAB | Refills: 3 | Status: SHIPPED | OUTPATIENT
Start: 2018-03-12 | End: 2019-04-20 | Stop reason: SDUPTHER

## 2018-04-23 ENCOUNTER — OFFICE VISIT (OUTPATIENT)
Dept: CARDIOLOGY CLINIC | Age: 63
End: 2018-04-23

## 2018-04-23 VITALS
OXYGEN SATURATION: 96 % | DIASTOLIC BLOOD PRESSURE: 60 MMHG | HEART RATE: 84 BPM | HEIGHT: 63 IN | WEIGHT: 192 LBS | BODY MASS INDEX: 34.02 KG/M2 | SYSTOLIC BLOOD PRESSURE: 130 MMHG

## 2018-04-23 DIAGNOSIS — Z98.61 CAD S/P PERCUTANEOUS CORONARY ANGIOPLASTY: Primary | ICD-10-CM

## 2018-04-23 DIAGNOSIS — E78.5 DYSLIPIDEMIA: ICD-10-CM

## 2018-04-23 DIAGNOSIS — I25.10 CAD S/P PERCUTANEOUS CORONARY ANGIOPLASTY: Primary | ICD-10-CM

## 2018-04-23 DIAGNOSIS — I11.0 HYPERTENSIVE HEART DISEASE WITH CONGESTIVE HEART FAILURE, UNSPECIFIED HEART FAILURE TYPE (HCC): ICD-10-CM

## 2018-04-23 RX ORDER — ROSUVASTATIN CALCIUM 20 MG/1
20 TABLET, COATED ORAL DAILY
Qty: 30 TAB | Refills: 6 | Status: SHIPPED | OUTPATIENT
Start: 2018-04-23 | End: 2019-10-13 | Stop reason: SDUPTHER

## 2018-04-23 NOTE — PROGRESS NOTES
1. Have you been to the ER, urgent care clinic since your last visit? Hospitalized since your last visit? Yes When: 09/07/2017 for Colon screening    2. Have you seen or consulted any other health care providers outside of the [address] [place] [address] since your last visit? Include any pap smears or colon screening.  No

## 2018-04-23 NOTE — MR AVS SNAPSHOT
[address] [place] [id number] 
[phone] Patient: Leanna Brown MRN: [id number] SHW:[id number] Visit Information Date & Time Provider Department Dept. Phone Encounter #  
 4/23/2018  8:20 AM Brenden To MD [facility] [id number] Upcoming Health Maintenance Date Due HEMOGLOBIN A1C Q6M 2/16/2018 EYE EXAM RETINAL OR DILATED Q1 2/16/2018 MICROALBUMIN Q1 8/16/2018 LIPID PANEL Q1 8/16/2018 FOOT EXAM Q1 10/16/2018 MEDICARE YEARLY EXAM 10/17/2018 COLONOSCOPY 9/7/2022 DTaP/Tdap/Td series (2 - Td) 10/16/2027 Allergies as of 4/23/2018  Review Complete On: 4/23/2018 By: Brenden To MD  
  
 Severity Noted Reaction Type Reactions Atorvastatin Medium 11/26/2014   Side Effect Myalgia High dose Peanut Medium 01/15/2014    Diarrhea, Nausea and Vomiting Acetaminophen  01/06/2017    Other (comments)  
 hallucinations Dimethicone  01/30/2015    Rash Percocet [Oxycodone-acetaminophen]  01/20/2014   Side Effect Other (comments)  
 hallucinations Current Immunizations  Reviewed on 10/16/2017 Name Date Influenza Vaccine 9/18/2014 Pneumococcal Polysaccharide (PPSV-23) 9/18/2014 Not reviewed this visit You Were Diagnosed With   
  
 Codes Comments CAD S/P percutaneous coronary angioplasty    -  Primary ICD-10-CM: I25.10, Z98.61 ICD-9-CM: 414.01, V45.82 Dyslipidemia     ICD-10-CM: E78.5 ICD-9-CM: 272.4 Hypertensive heart disease with congestive heart failure, unspecified heart failure type ([facility] [address]     ICD-10-CM: I11.0 ICD-9-CM: 402.91, 428.0 Hypercholesterolemia     ICD-10-CM: E78.00 ICD-9-CM: 272.0 Vitals BP Pulse Height(growth percentile) Weight(growth percentile) SpO2 BMI  
 130/60 (BP 1 Location: Left arm, BP Patient Position: Sitting) 84 5' 3\" (1.6 m) 192 lb (87.1 kg) 96% 34.01 kg/m2 Smoking Status Former Smoker Vitals History BMI and BSA Data Body Mass Index Body Surface Area 34.01 kg/m 2 1.97 m 2 Preferred Pharmacy Pharmacy Name Phone Rachel Espinoza [facility], [address] [place] [address] Rd [phone] Your Updated Medication List  
  
   
This list is accurate as of 4/23/18  9:20 AM.  Always use your most recent med list.  
  
  
  
  
 aspirin delayed-release 81 mg tablet Take 81 mg by mouth daily. carvedilol 12.5 mg tablet Commonly known as:  COREG  
TAKE ONE TABLET BY MOUTH TWICE DAILY WITH MEALS  
  
 clopidogrel 75 mg Tab Commonly known as:  PLAVIX TAKE ONE TABLET BY MOUTH ONCE DAILY  
  
 cpap machine kit  
by Does Not Apply route. Overnight CPAP at 16 CWP with ramp and humidifier. Mask: Simplus FF small or mask of choice. Supplies 99 month. Please send compliance data [id number]. Diagnosis DAYANA. AHI 52 RDI 52  
  
 furosemide 40 mg tablet Commonly known as:  LASIX TAKE ONE TABLET BY MOUTH ONCE DAILY with potassium chloride  
  
 glyBURIDE 2.5 mg tablet Commonly known as:  Brayan Slates Take 1 Tab by mouth daily. lisinopril 5 mg tablet Commonly known as:  PRINIVIL, ZESTRIL  
TAKE ONE TABLET BY MOUTH ONCE DAILY  
  
 nitroglycerin 0.4 mg SL tablet Commonly known as:  NITROSTAT  
1 Tab by SubLINGual route every five (5) minutes as needed for Chest Pain.  
  
 potassium chloride 10 mEq tablet Commonly known as:  KLOR-CON M10  
TAKE ONE TABLET BY MOUTH ONCE DAILY with Lasix  
  
 rosuvastatin 20 mg tablet Commonly known as:  CRESTOR Take 1 Tab by mouth daily. Prescriptions Sent to Pharmacy Refills  
 rosuvastatin (CRESTOR) 20 mg tablet 6 Sig: Take 1 Tab by mouth daily. Class: Normal  
 Pharmacy: [facility] DR MAGGIE WALKER [facility], [address] [place] [address] Rd Ph #: [phone] Route: Oral  
  
We Performed the Following AMB POC EKG ROUTINE W/ 12 LEADS, INTER & REP [27048 CPT(R)] Patient Instructions Increase Coreg to 25 mg twice daily Increase Crestor to 20 mg daily Please provide this summary of care documentation to your next provider. Your primary care clinician is listed as Luis Waldrop. If you have any questions after today's visit, please call [phone].

## 2018-04-23 NOTE — PROGRESS NOTES
HISTORY OF PRESENT ILLNESS  Nunu Bowling is a 58 y.o. male. HPI  He has been feeling well. He denies exertional chest pain, dyspnea, orthopnea, PND. He has had no palpitations, dizziness or syncope. He denies any symptoms of TIA or amaurosis fugax. He has gained about 10 pounds. He has not been on a good diet. He has been eating pancakes with syrups and hearty meals at the breakfast place quite often. He had cholesterol profile on 8/16/17 which demonstrated triglyceride up to 234, HDL 31 and LD 88 on Crestor at 10 mg a day. He underwent stress nuclear cardiac imaging on 11/13/17 which demonstrated no reversible perfusion imaging abnormalities to indicate ischemia. There was a small to medium sized and fixed anterolateral perfusion defect consistent with prior myocardial infarction. EF was estimated at 40%, essentially unchanged. He has history of known coronary artery disease. He underwent CABG X four in January of 2014, which consisted of:    1. LIMA to the proximal LAD               2. Single SVG to the distal LAD               3. Sequential SVG to the RPDA and OM2.       He was readmitted with a non-STEMI myocardial infarction in January of 2015 and underwent cardiac catheterization, which demonstrated:    1. Patent LIMA to the LAD                2. Total occlusion of the SVG to the LAD                3. 95% ostial stenosis of the sequential SVG to the RPDA and obtuse marginal branch 2 with 70% OM just after the anastomosis.       He subsequently underwent successful stenting of the vein graft to the RPDA and OM along with OM branch with a Xience drug-eluting stent.       He has history of hypertension, diabetes mellitus, and dyslipidemia. He does have history of metabolic syndrome with high triglycerides and low HDL along with diabetes and hypertension.       His last echocardiogram in March of 2014 demonstrated mild to moderate LV dysfunction with EF in the 40-45% range.   There was no significant valvular dysfunction. There was no evidence of significant pulmonary hypertension. Review of Systems   Constitutional: Negative for malaise/fatigue and weight loss. HENT: Negative for hearing loss. Eyes: Negative for blurred vision and double vision. Respiratory: Negative for shortness of breath. Cardiovascular: Negative for chest pain, palpitations, orthopnea, claudication, leg swelling and PND. Gastrointestinal: Negative for blood in stool, heartburn and melena. Genitourinary: Negative for dysuria, frequency, hematuria and urgency. Musculoskeletal: Negative for back pain and joint pain. Skin: Negative for itching and rash. Neurological: Negative for dizziness, loss of consciousness and weakness. Psychiatric/Behavioral: Negative for depression and memory loss. Physical Exam   Constitutional: He is oriented to person, place, and time. He appears well-developed and well-nourished. HENT:   Head: Normocephalic and atraumatic. Eyes: Conjunctivae are normal. Pupils are equal, round, and reactive to light. Neck: Normal range of motion. Neck supple. No JVD present. Cardiovascular: Normal rate, regular rhythm, S1 normal and S2 normal.   No extrasystoles are present. Exam reveals no gallop and no friction rub. No murmur heard. Pulses:       Carotid pulses are 3+ on the right side, and 3+ on the left side. Pulmonary/Chest: Effort normal. He has no wheezes. He has no rales. Abdominal: Soft. There is no tenderness. Musculoskeletal: He exhibits no edema. Neurological: He is alert and oriented to person, place, and time. No cranial nerve deficit. Skin: Skin is warm and dry. Psychiatric: He has a normal mood and affect.  His behavior is normal.     Visit Vitals    /60 (BP 1 Location: Left arm, BP Patient Position: Sitting)    Pulse 84    Ht 5' 3\" (1.6 m)    Wt 87.1 kg (192 lb)    SpO2 96%    BMI 34.01 kg/m2       Past Medical History:   Diagnosis Date    BPPV (benign paroxysmal positional vertigo) 5/26/2010    CABG x 4     CAD (coronary artery disease), native coronary artery     cath (jan 2014): 40% LM, pLAD 85%, dLAD 70%, dLCx 80%, mRCA 95%. s/p CABG x 4 (LIMA->D, SVG->dLAD, Seq SVG -> RPDA & OM2)    Cardiac catheterization 01/10/2014    pLM 40%. pLAD 85%. dCX 80%. mRCA 95%. CABG recommended.  Cardiac echocardiogram 09/03/2014    Tech difficult. EF 40-45%. Mild, diffuse hypk. Mild LVH. Indeterminate diastolic fx. Mild LAE. No significant valvular heart disease.  Carotid duplex 01/13/2014    Mod 50-69% VALENTIN stenosis. Mild 2-48% LICA stenosis. Occluded right vertebral.       Carpal tunnel syndrome     Cerebrovascular disease     Moderate VALENTIN, mild LICA, occluded R vertebral (Doppler Jan 2014)    Chest pain, musculoskeletal     post CABG    Diabetes ([address]     Dyslipidemia     H/O tobacco use, presenting hazards to health     quit 2014    Headache(784.0)     cluster    Heart attack ([address]     Hiatal hernia     Hypercholesterolemia     Hypertension     Hypertensive heart disease with congestive heart failure ([address]     Internal carotid artery stenosis 5/26/2010    Obesity (BMI 30-39. 9)     S/P colonoscopic polypectomy 09/2017    tubular adenoma, f/u 5 years    Sleep apnea     does not use cpap machine    Stroke ([address] 5/22/09    TIA (no residuals)    Systolic HF (heart failure) (HCC)     LV EF 35% (echo Jan 2014).  Mean LA pressure 32 (Jan 2014)    Vertigo        Social History     Social History    Marital status:      Spouse name: N/A    Number of children: N/A    Years of education: N/A     Occupational History    retired      Social History Main Topics    Smoking status: Former Smoker     Packs/day: 0.25     Years: 10.00     Types: Cigarettes     Quit date: 1/9/2014    Smokeless tobacco: Never Used      Comment:  less then 5 ciggerates a week    Alcohol use Yes      Comment: beer occasionally    Drug use: No    Sexual activity: Not Currently     Partners: Female     Other Topics Concern    Not on file     Social History Narrative       Family History   Problem Relation Age of Onset    Cancer Father      prostate    Heart Disease Father     Heart Disease Brother      age 48   Smiley Gunning Other Son      Brain damage    Heart Attack Brother        Past Surgical History:   Procedure Laterality Date    COLONOSCOPY N/A 9/7/2017    COLONOSCOPY with polypectomy performed by Mukund Santamaria MD at [facility]  1/14/14    LIMA to the D1, and GSV to the distal LAD, and anotherGSV sequentially to the PDA of the RCA and to theOM2 and ascending aortic endarterectomy, Dr. Kelin Vaughn, 1/14/14.  HX CORONARY STENT PLACEMENT      HX UROLOGICAL      repair of spermatocele       Current Outpatient Prescriptions   Medication Sig Dispense Refill    clopidogrel (PLAVIX) 75 mg tab TAKE ONE TABLET BY MOUTH ONCE DAILY 90 Tab 3    lisinopril (PRINIVIL, ZESTRIL) 5 mg tablet TAKE ONE TABLET BY MOUTH ONCE DAILY 30 Tab 11    glyBURIDE (DIABETA) 2.5 mg tablet Take 1 Tab by mouth daily. 90 Tab 0    rosuvastatin (CRESTOR) 10 mg tablet TAKE ONE TABLET BY MOUTH ONCE NIGHTLY 90 Tab 3    aspirin delayed-release 81 mg tablet Take 81 mg by mouth daily.  furosemide (LASIX) 40 mg tablet TAKE ONE TABLET BY MOUTH ONCE DAILY with potassium chloride 90 Tab 3    potassium chloride (KLOR-CON M10) 10 mEq tablet TAKE ONE TABLET BY MOUTH ONCE DAILY with Lasix 90 Tab 3    carvedilol (COREG) 12.5 mg tablet TAKE ONE TABLET BY MOUTH TWICE DAILY WITH MEALS 60 Tab 11    nitroglycerin (NITROSTAT) 0.4 mg SL tablet 1 Tab by SubLINGual route every five (5) minutes as needed for Chest Pain. 1 Bottle 0    cpap machine kit by Does Not Apply route. Overnight CPAP at 16 CWP with ramp and humidifier. Mask: Simplus FF small or mask of choice. Supplies 99 month. Please send compliance data [id number]. Diagnosis DAYANA.  AHI 52 RDI 52 1 Kit 0 EKG: unchanged from previous tracings, normal sinus rhythm, nonspecific ST and T waves changes. ASSESSMENT and PLAN  Encounter Diagnoses   Name Primary?  CAD S/P CABG x 4,2014, S/P stent to Seq. SVG to rPDA-OM2 Jan.2015,EF 40-45% Yes    Dyslipidemia     Hypertensive heart disease with congestive heart failure, unspecified heart failure type [facility])    He has been doing reasonably well. However, he has gained 10 pounds and he has a great deal of dietary indiscretion. He was given strong advice to cut down carbohydrate and fat intake as well as caloric intake. He was advised to exercise regularly by walking at least 3 times a week. I would increased carvedilol to 25 mg twice a day from 12.5 mg and also increase Crestor to 20 mg from 10 mg.

## 2018-05-07 ENCOUNTER — CLINICAL SUPPORT (OUTPATIENT)
Dept: CARDIOLOGY CLINIC | Age: 63
End: 2018-05-07

## 2018-05-07 VITALS
HEIGHT: 63 IN | WEIGHT: 189 LBS | SYSTOLIC BLOOD PRESSURE: 112 MMHG | OXYGEN SATURATION: 97 % | HEART RATE: 77 BPM | DIASTOLIC BLOOD PRESSURE: 66 MMHG | BODY MASS INDEX: 33.49 KG/M2

## 2018-05-07 DIAGNOSIS — I50.42 HYPERTENSIVE HEART DISEASE WITH CHRONIC COMBINED SYSTOLIC AND DIASTOLIC CONGESTIVE HEART FAILURE (HCC): Primary | ICD-10-CM

## 2018-05-07 DIAGNOSIS — I11.0 HYPERTENSIVE HEART DISEASE WITH CHRONIC COMBINED SYSTOLIC AND DIASTOLIC CONGESTIVE HEART FAILURE (HCC): Primary | ICD-10-CM

## 2018-05-07 RX ORDER — CARVEDILOL 25 MG/1
25 TABLET ORAL 2 TIMES DAILY WITH MEALS
Qty: 90 TAB | Refills: 3 | Status: SHIPPED | OUTPATIENT
Start: 2018-05-07 | End: 2019-09-16 | Stop reason: SDUPTHER

## 2018-05-07 RX ORDER — CARVEDILOL 25 MG/1
25 TABLET ORAL 2 TIMES DAILY WITH MEALS
COMMUNITY
End: 2018-05-07 | Stop reason: SDUPTHER

## 2018-05-07 NOTE — PROGRESS NOTES
Nunu Bowling is a 58 y.o. male that is here for a blood pressure check after increasing Coreg from 12.5 bid to 25 mg bid at last office visit on 4/23/18 with Dr Massimo Cristina. His current medications are listed below. Current Outpatient Prescriptions   Medication Sig    carvedilol (COREG) 25 mg tablet Take 25 mg by mouth two (2) times daily (with meals).  rosuvastatin (CRESTOR) 20 mg tablet Take 1 Tab by mouth daily.  clopidogrel (PLAVIX) 75 mg tab TAKE ONE TABLET BY MOUTH ONCE DAILY    lisinopril (PRINIVIL, ZESTRIL) 5 mg tablet TAKE ONE TABLET BY MOUTH ONCE DAILY    glyBURIDE (DIABETA) 2.5 mg tablet Take 1 Tab by mouth daily.  aspirin delayed-release 81 mg tablet Take 81 mg by mouth daily.  furosemide (LASIX) 40 mg tablet TAKE ONE TABLET BY MOUTH ONCE DAILY with potassium chloride    potassium chloride (KLOR-CON M10) 10 mEq tablet TAKE ONE TABLET BY MOUTH ONCE DAILY with Lasix    nitroglycerin (NITROSTAT) 0.4 mg SL tablet 1 Tab by SubLINGual route every five (5) minutes as needed for Chest Pain.  cpap machine kit by Does Not Apply route. Overnight CPAP at 16 CWP with ramp and humidifier. Mask: Simplus FF small or mask of choice. Supplies 99 month. Please send compliance data [id number]. Diagnosis DAYANA. AHI 52 RDI 52     No current facility-administered medications for this visit. His   Visit Vitals    /66    Pulse 77    Ht 5' 3\" (1.6 m)    Wt 85.7 kg (189 lb)    SpO2 97%    BMI 33.48 kg/m2       Patient was seen in office today for a bp/hr check after increasing Coreg from 12.5 bid to 25 mg bid on 4/23/18 by Dr Massimo Cristina. Patient's Crestor was also increased at the time of the appt from 10 mg to 20 mg. Discussed medications with patient, and he states he takes all medications as prescribed. Patient denies having any cardiac symptoms such as chest pain, palpitations, dizziness, SOB or swelling. Patient's bp/ hr at last visit was 130/60 84 and today it is 112/66 77.  Will keep patient on current medication therapy, advised patient to keep all scheduled follow up appts with Dr Mariia Hong. Will send a new rx for Coreg to [facility] DR MAGGIE WALKER at patient's request/sending to provider for approval.  Patient indicates understanding and agrees to continue medications and keep all follow up appts.

## 2018-06-19 ENCOUNTER — DOCUMENTATION ONLY (OUTPATIENT)
Dept: FAMILY MEDICINE CLINIC | Age: 63
End: 2018-06-19

## 2018-06-19 ENCOUNTER — TELEPHONE (OUTPATIENT)
Dept: FAMILY MEDICINE CLINIC | Age: 63
End: 2018-06-19

## 2018-06-19 NOTE — TELEPHONE ENCOUNTER
Pt called and stated she pulled his lower back and would like to know what kind of pain meds he could buy over the counter. Please call pt at your earliest convenience.

## 2018-06-19 NOTE — PROGRESS NOTES
Pt was requesting something that would be safe for him to take for back pain. Spoke with DR Evonnie Hammans and he said that it would be ok for the pt to take Tylenol. Pt voiced understanding.

## 2018-06-19 NOTE — TELEPHONE ENCOUNTER
Patient is 2 months overdue for follow-up. He was a no-show 2 months ago. I would advise that he schedules to see me so I can provide more sound advice.

## 2018-06-21 ENCOUNTER — OFFICE VISIT (OUTPATIENT)
Dept: FAMILY MEDICINE CLINIC | Age: 63
End: 2018-06-21

## 2018-06-21 VITALS
DIASTOLIC BLOOD PRESSURE: 76 MMHG | WEIGHT: 188 LBS | BODY MASS INDEX: 33.31 KG/M2 | RESPIRATION RATE: 16 BRPM | OXYGEN SATURATION: 96 % | HEIGHT: 63 IN | HEART RATE: 78 BPM | SYSTOLIC BLOOD PRESSURE: 118 MMHG | TEMPERATURE: 98.1 F

## 2018-06-21 DIAGNOSIS — E66.9 OBESITY (BMI 30-39.9): ICD-10-CM

## 2018-06-21 DIAGNOSIS — Z95.1 S/P CABG X 4: ICD-10-CM

## 2018-06-21 DIAGNOSIS — I50.22 CHRONIC SYSTOLIC HF (HEART FAILURE) (HCC): ICD-10-CM

## 2018-06-21 DIAGNOSIS — E11.9 CONTROLLED TYPE 2 DIABETES MELLITUS WITHOUT COMPLICATION, WITHOUT LONG-TERM CURRENT USE OF INSULIN (HCC): Primary | ICD-10-CM

## 2018-06-21 DIAGNOSIS — S39.012A STRAIN OF LUMBAR REGION, INITIAL ENCOUNTER: ICD-10-CM

## 2018-06-21 DIAGNOSIS — Z91.199 MEDICALLY NONCOMPLIANT: ICD-10-CM

## 2018-06-21 DIAGNOSIS — E78.5 DYSLIPIDEMIA: ICD-10-CM

## 2018-06-21 DIAGNOSIS — I11.0 HYPERTENSIVE HEART DISEASE WITH CONGESTIVE HEART FAILURE, UNSPECIFIED HEART FAILURE TYPE (HCC): ICD-10-CM

## 2018-06-21 DIAGNOSIS — G47.33 SEVERE OBSTRUCTIVE SLEEP APNEA: ICD-10-CM

## 2018-06-21 DIAGNOSIS — I67.9 CEREBROVASCULAR DISEASE: ICD-10-CM

## 2018-06-21 RX ORDER — CYCLOBENZAPRINE HCL 10 MG
10 TABLET ORAL
Qty: 30 TAB | Refills: 0 | Status: SHIPPED | OUTPATIENT
Start: 2018-06-21 | End: 2019-01-07 | Stop reason: ALTCHOICE

## 2018-06-21 NOTE — PATIENT INSTRUCTIONS
A Healthy Lifestyle: Care Instructions  Your Care Instructions    A healthy lifestyle can help you feel good, stay at a healthy weight, and have plenty of energy for both work and play. A healthy lifestyle is something you can share with your whole family. A healthy lifestyle also can lower your risk for serious health problems, such as high blood pressure, heart disease, and diabetes. You can follow a few steps listed below to improve your health and the health of your family. Follow-up care is a key part of your treatment and safety. Be sure to make and go to all appointments, and call your doctor if you are having problems. It's also a good idea to know your test results and keep a list of the medicines you take. How can you care for yourself at home? · Do not eat too much sugar, fat, or fast foods. You can still have dessert and treats now and then. The goal is moderation. · Start small to improve your eating habits. Pay attention to portion sizes, drink less juice and soda pop, and eat more fruits and vegetables. ¨ Eat a healthy amount of food. A 3-ounce serving of meat, for example, is about the size of a deck of cards. Fill the rest of your plate with vegetables and whole grains. ¨ Limit the amount of soda and sports drinks you have every day. Drink more water when you are thirsty. ¨ Eat at least 5 servings of fruits and vegetables every day. It may seem like a lot, but it is not hard to reach this goal. A serving or helping is 1 piece of fruit, 1 cup of vegetables, or 2 cups of leafy, raw vegetables. Have an apple or some carrot sticks as an afternoon snack instead of a candy bar. Try to have fruits and/or vegetables at every meal.  · Make exercise part of your daily routine. You may want to start with simple activities, such as walking, bicycling, or slow swimming. Try to be active 30 to 60 minutes every day. You do not need to do all 30 to 60 minutes all at once.  For example, you can exercise 3 times a day for 10 or 20 minutes. Moderate exercise is safe for most people, but it is always a good idea to talk to your doctor before starting an exercise program.  · Keep moving. Isael Boron the lawn, work in the garden, or [employer]. Take the stairs instead of the elevator at work. · If you smoke, quit. People who smoke have an increased risk for heart attack, stroke, cancer, and other lung illnesses. Quitting is hard, but there are ways to boost your chance of quitting tobacco for good. ¨ Use nicotine gum, patches, or lozenges. ¨ Ask your doctor about stop-smoking programs and medicines. ¨ Keep trying. In addition to reducing your risk of diseases in the future, you will notice some benefits soon after you stop using tobacco. If you have shortness of breath or asthma symptoms, they will likely get better within a few weeks after you quit. · Limit how much alcohol you drink. Moderate amounts of alcohol (up to 2 drinks a day for men, 1 drink a day for women) are okay. But drinking too much can lead to liver problems, high blood pressure, and other health problems. Family health  If you have a family, there are many things you can do together to improve your health. · Eat meals together as a family as often as possible. · Eat healthy foods. This includes fruits, vegetables, lean meats and dairy, and whole grains. · Include your family in your fitness plan. Most people think of activities such as jogging or tennis as the way to fitness, but there are many ways you and your family can be more active. Anything that makes you breathe hard and gets your heart pumping is exercise. Here are some tips:  ¨ Walk to do errands or to take your child to school or the bus. ¨ Go for a family bike ride after dinner instead of watching TV. Where can you learn more? Go to http://miah-lucy.info/. Enter F370 in the search box to learn more about \"A Healthy Lifestyle: Care Instructions. \"  Current as of: May 12, 2017  Content Version: 11.4  © 3753-6547 [employer]. Care instructions adapted under license by [employer] (which disclaims liability or warranty for this information). If you have questions about a medical condition or this instruction, always ask your healthcare professional. [address] any warranty or liability for your use of this information. Learning About Diabetes Food Guidelines  Your Care Instructions    Meal planning is important to manage diabetes. It helps keep your blood sugar at a target level (which you set with your doctor). You don't have to eat special foods. You can eat what your family eats, including sweets once in a while. But you do have to pay attention to how often you eat and how much you eat of certain foods. You may want to work with a dietitian or a certified diabetes educator (CDE) to help you plan meals and snacks. A dietitian or CDE can also help you lose weight if that is one of your goals. What should you know about eating carbs? Managing the amount of carbohydrate (carbs) you eat is an important part of healthy meals when you have diabetes. Carbohydrate is found in many foods. · Learn which foods have carbs. And learn the amounts of carbs in different foods. ¨ Bread, cereal, pasta, and rice have about 15 grams of carbs in a serving. A serving is 1 slice of bread (1 ounce), ½ cup of cooked cereal, or 1/3 cup of cooked pasta or rice. ¨ Fruits have 15 grams of carbs in a serving. A serving is 1 small fresh fruit, such as an apple or orange; ½ of a banana; ½ cup of cooked or canned fruit; ½ cup of fruit juice; 1 cup of melon or raspberries; or 2 tablespoons of dried fruit. ¨ Milk and no-sugar-added yogurt have 15 grams of carbs in a serving. A serving is 1 cup of milk or 2/3 cup of no-sugar-added yogurt. ¨ Starchy vegetables have 15 grams of carbs in a serving.  A serving is ½ cup of mashed potatoes or sweet potato; 1 cup winter squash; ½ of a small baked potato; ½ cup of cooked beans; or ½ cup cooked corn or green peas. · Learn how much carbs to eat each day and at each meal. A dietitian or CDE can teach you how to keep track of the amount of carbs you eat. This is called carbohydrate counting. · If you are not sure how to count carbohydrate grams, use the Plate Method to plan meals. It is a good, quick way to make sure that you have a balanced meal. It also helps you spread carbs throughout the day. ¨ Divide your plate by types of foods. Put non-starchy vegetables on half the plate, meat or other protein food on one-quarter of the plate, and a grain or starchy vegetable in the final quarter of the plate. To this you can add a small piece of fruit and 1 cup of milk or yogurt, depending on how many carbs you are supposed to eat at a meal.  · Try to eat about the same amount of carbs at each meal. Do not \"save up\" your daily allowance of carbs to eat at one meal.  · Proteins have very little or no carbs per serving. Examples of proteins are beef, chicken, turkey, fish, eggs, tofu, cheese, cottage cheese, and peanut butter. A serving size of meat is 3 ounces, which is about the size of a deck of cards. Examples of meat substitute serving sizes (equal to 1 ounce of meat) are 1/4 cup of cottage cheese, 1 egg, 1 tablespoon of peanut butter, and ½ cup of tofu. How can you eat out and still eat healthy? · Learn to estimate the serving sizes of foods that have carbohydrate. If you measure food at home, it will be easier to estimate the amount in a serving of restaurant food. · If the meal you order has too much carbohydrate (such as potatoes, corn, or baked beans), ask to have a low-carbohydrate food instead. Ask for a salad or green vegetables. · If you use insulin, check your blood sugar before and after eating out to help you plan how much to eat in the future.   · If you eat more carbohydrate at a meal than you had planned, take a walk or do other exercise. This will help lower your blood sugar. What else should you know? · Limit saturated fat, such as the fat from meat and dairy products. This is a healthy choice because people who have diabetes are at higher risk of heart disease. So choose lean cuts of meat and nonfat or low-fat dairy products. Use olive or canola oil instead of butter or shortening when cooking. · Don't skip meals. Your blood sugar may drop too low if you skip meals and take insulin or certain medicines for diabetes. · Check with your doctor before you drink alcohol. Alcohol can cause your blood sugar to drop too low. Alcohol can also cause a bad reaction if you take certain diabetes medicines. Follow-up care is a key part of your treatment and safety. Be sure to make and go to all appointments, and call your doctor if you are having problems. It's also a good idea to know your test results and keep a list of the medicines you take. Where can you learn more? Go to http://miah-lucy.info/. Enter O813 in the search box to learn more about \"Learning About Diabetes Food Guidelines. \"  Current as of: March 13, 2017  Content Version: 11.4  © 4862-4716 [employer]. Care instructions adapted under license by [employer] (which disclaims liability or warranty for this information). If you have questions about a medical condition or this instruction, always ask your healthcare professional. [place] [address] any warranty or liability for your use of this information. Back Pain: Care Instructions  Your Care Instructions    Back pain has many possible causes. It is often related to problems with muscles and ligaments of the back. It may also be related to problems with the nerves, discs, or bones of the back. Moving, lifting, standing, sitting, or sleeping in an awkward way can strain the back. Sometimes you don't notice the injury until later. Arthritis is another common cause of back pain. Although it may hurt a lot, back pain usually improves on its own within several weeks. Most people recover in 12 weeks or less. Using good home treatment and being careful not to stress your back can help you feel better sooner. Follow-up care is a key part of your treatment and safety. Be sure to make and go to all appointments, and call your doctor if you are having problems. It's also a good idea to know your test results and keep a list of the medicines you take. How can you care for yourself at home? · Sit or lie in positions that are most comfortable and reduce your pain. Try one of these positions when you lie down:  ¨ Lie on your back with your knees bent and supported by large pillows. ¨ Lie on the floor with your legs on the seat of a sofa or chair. [place] on your side with your knees and hips bent and a pillow between your legs. ¨ Lie on your stomach if it does not make pain worse. · Do not sit up in bed, and avoid soft couches and twisted positions. Bed rest can help relieve pain at first, but it delays healing. Avoid bed rest after the first day of back pain. · Change positions every 30 minutes. If you must sit for long periods of time, take breaks from sitting. Get up and walk around, or lie in a comfortable position. · Try using a heating pad on a low or medium setting for 15 to 20 minutes every 2 or 3 hours. Try a warm shower in place of one session with the heating pad. · You can also try an ice pack for 10 to 15 minutes every 2 to 3 hours. Put a thin cloth between the ice pack and your skin. · Take pain medicines exactly as directed. ¨ If the doctor gave you a prescription medicine for pain, take it as prescribed. ¨ If you are not taking a prescription pain medicine, ask your doctor if you can take an over-the-counter medicine. · Take short walks several times a day.  You can start with 5 to 10 minutes, 3 or 4 times a day, and work up to longer walks. Walk on level surfaces and avoid hills and stairs until your back is better. · Return to work and other activities as soon as you can. Continued rest without activity is usually not good for your back. · To prevent future back pain, do exercises to stretch and strengthen your back and stomach. Learn how to use good posture, safe lifting techniques, and proper body mechanics. When should you call for help? Call your doctor now or seek immediate medical care if:  ? · You have new or worsening numbness in your legs. ? · You have new or worsening weakness in your legs. (This could make it hard to stand up.)   ? · You lose control of your bladder or bowels. ? Watch closely for changes in your health, and be sure to contact your doctor if:  ? · Your pain gets worse. ? · You are not getting better after 2 weeks. Where can you learn more? Go to http://miah-lucy.info/. Enter E592 in the search box to learn more about \"Back Pain: Care Instructions. \"  Current as of: March 21, 2017  Content Version: 11.4  © 3408-6123 [employer]. Care instructions adapted under license by [employer] (which disclaims liability or warranty for this information). If you have questions about a medical condition or this instruction, always ask your healthcare professional. [address] any warranty or liability for your use of this information. Learning About Relief for Back Pain  What is back tension and strain? Back strain happens when you overstretch, or pull, a muscle in your back. You may hurt your back in an accident or when you exercise or lift something. Most back pain will get better with rest and time. You can take care of yourself at home to help your back heal.  What can you do first to relieve back pain? When you first feel back pain, try these steps:  · Walk.  Take a short walk (10 to 20 minutes) on a level surface (no slopes, hills, or stairs) every 2 to 3 hours. Walk only distances you can manage without pain, especially leg pain. · Relax. Find a comfortable position for rest. Some people are comfortable on the floor or a medium-firm bed with a small pillow under their head and another under their knees. Some people prefer to lie on their side with a pillow between their knees. Don't stay in one position for too long. · Try heat or ice. Try using a heating pad on a low or medium setting, or take a warm shower, for 15 to 20 minutes every 2 to 3 hours. Or you can buy single-use heat wraps that last up to 8 hours. You can also try an ice pack for 10 to 15 minutes every 2 to 3 hours. You can use an ice pack or a bag of frozen vegetables wrapped in a thin towel. There is not strong evidence that either heat or ice will help, but you can try them to see if they help. You may also want to try switching between heat and cold. · Take pain medicine exactly as directed. ¨ If the doctor gave you a prescription medicine for pain, take it as prescribed. ¨ If you are not taking a prescription pain medicine, ask your doctor if you can take an over-the-counter medicine. What else can you do? · Stretch and exercise. Exercises that increase flexibility may relieve your pain and make it easier for your muscles to keep your spine in a good, neutral position. And don't forget to keep walking. · Do self-massage. You can use self-massage to unwind after work or school or to energize yourself in the morning. You can easily massage your feet, hands, or neck. Self-massage works best if you are in comfortable clothes and are sitting or lying in a comfortable position. Use oil or lotion to massage bare skin. · Reduce stress. Back pain can lead to a vicious [place]: Distress about the pain tenses the muscles in your back, which in turn causes more pain. Learn how to relax your mind and your muscles to lower your stress. Where can you learn more?   Go to http://miah-lucy.info/. Enter A000 in the search box to learn more about \"Learning About Relief for Back Pain. \"  Current as of: March 21, 2017  Content Version: 11.4  © 2995-6659 [employer]. Care instructions adapted under license by [employer] (which disclaims liability or warranty for this information). If you have questions about a medical condition or this instruction, always ask your healthcare professional. [place] [address] any warranty or liability for your use of this information.   Follow up 4 months for annual medicare wellness/routine care and please have 10 hour fasting labs today (06-)

## 2018-06-21 NOTE — PROGRESS NOTES
SUBJECTIVE  Chief Complaint   Patient presents with    Back Pain     onset Monday June 18,2018    Hip Pain     c/o left hip pain      Here for routine follow-up. He has a recent history of medical noncompliance. He missed his April appt. He has a cardiology no-show as well but was seen in April. He has had some meds adjusted. Overall he states he is doing well other than acute left sided low back pain as he was bending forward to pick something up. He says that hte pain is a constant tightness. He has had some relief with tylenol and heat. This started 2 days ago. No radiation of pain. No bowel or bladder changes. No saddle anesthesia. No radicular weakness. He is unsure if he is taking glyburide. Had a diabetic eye exam in 2017 and does not want to go back due to the co-pay. Does not report any polyuria or polydipsia. He is currently doing well from a cardiac standpoint without any cardiac chest pain or dyspnea. He has been compliant with Plavix. He is tolerating his statin without myalgias. He has sleep apnea but cannot wear CPAP because he says it suffocates him. OBJECTIVE    Blood pressure 118/76, pulse 78, temperature 98.1 °F (36.7 °C), temperature source Oral, resp. rate 16, height 5' 3\" (1.6 m), weight 188 lb (85.3 kg), SpO2 96 %. General:  alert, cooperative, well appearing, in no apparent distress. Heart: Normal S1S2, RRR. Chest: Clear, no w/r/r. Skin:  No pedal lesions. Neuro:  Monofilament testing is intact. No motor or sensory deficits. Foot exam  was performed. No deformities. Sensory and motor testing was assessed - intact. Pedal pulse(s) was assessed - intact. No pedal lesions. Back:  Negative SLR. Full ROM but slow. Rigid left paralumbar muscles. Nontender midline, paralumbar muscles, and SI joints. Psych: normal affect. Mood good. Oriented x 3. Judgement and insight intact. ASSESSMENT / PLAN    ICD-10-CM ICD-9-CM    1.  Controlled type 2 diabetes mellitus without complication, without long-term current use of insulin ([facility]) E11.9 250.00 CBC W/O DIFF      METABOLIC PANEL, COMPREHENSIVE      LIPID PANEL      HEMOGLOBIN A1C W/O EAG      MICROALBUMIN, UR, RAND W/ MICROALB/CREAT RATIO   2. Chronic systolic HF (heart failure) ([facility]) I50.22 428.22    3. Hypertensive heart disease with congestive heart failure, unspecified heart failure type ([facility]) I11.0 402.91      428.0    4. S/P CABG x 4 Z95.1 V45.81    5. Severe obstructive sleep apnea G47.33 327.23    6. Obesity (BMI 30-39. 9) E66.9 278.00    7. Dyslipidemia A15.8 503.6 METABOLIC PANEL, COMPREHENSIVE      LIPID PANEL   8. Strain of lumbar region, initial encounter S39.012A 847.2      Overdue for labs. Ordered today since he is fasting. Diabetes -  I have counseled him on diet lower in carbs. He will check if he is taking glyburide. A1c in the past showed adequate control. Advised regular foot checks and annual eye exams. He is declining an eye referral due to cost.    CAD / chronic CHF (compensated) / hypertensive heart disease / dyslipidemia - under care of cardiology. Continue current care. Refills of meds as needed. Checking LFTs and lipids. Sleep apnea - he is not tolerating CPAP. He can see his sleep specialist for this. Obesity - Diabetic dieting. Low back strain - cont tylenol. Trial of flexeril at night. Pt ed handouts. If persists in 4-6 weeks, should get imaging. 25 minutes of face to face time spent with the patient with at least 50% on counseling on above medical issues with respect to med compliance, screening tests, and prevention. All chart history elements were reviewed by me at the time of the visit even though marked at time of note closure. Patient understands our medical plan. Patient has provided input and agrees with goals. Alternatives have been explained and offered. All questions answered.   The patient is to call if condition worsens or fails to improve. Follow-up Disposition:  Return in about 4 months (around 10/21/2018) for annual medicare wellness/routine care and please have 10 hour fasting labs today (06-).

## 2018-06-21 NOTE — MR AVS SNAPSHOT
[address] [place] [address] 
[phone] Patient: Bren Avila MRN: [id number] Visit Information Date & Time Provider Department Dept. Phone Encounter #  
 6/21/2018  8:30 AM Gris Bo, [facility] [id number] Follow-up Instructions Return in about 4 months (around 10/21/2018) for annual medicare wellness/routine care and please have 10 hour fasting labs today (06-). Your Appointments 10/24/2018  8:40 AM  
Follow Up with David Ding MD  
[facility] ([facility]) Appt Note: 6 mo f/u  
 [address] [place] [address] [place] [address]  
[phone] [address] [place] [address] [address] Upcoming Health Maintenance Date Due HEMOGLOBIN A1C Q6M 2/16/2018 EYE EXAM RETINAL OR DILATED Q1 2/16/2018 Influenza Age 5 to Adult 8/1/2018 MICROALBUMIN Q1 8/16/2018 LIPID PANEL Q1 8/16/2018 FOOT EXAM Q1 10/16/2018 MEDICARE YEARLY EXAM 10/17/2018 COLONOSCOPY 9/7/2022 DTaP/Tdap/Td series (2 - Td) 10/16/2027 Allergies as of 6/21/2018  Review Complete On: 6/21/2018 By: Gris Bo MD  
  
 Severity Noted Reaction Type Reactions Atorvastatin Medium 11/26/2014   Side Effect Myalgia High dose Peanut Medium 01/15/2014    Diarrhea, Nausea and Vomiting Acetaminophen  01/06/2017    Other (comments)  
 hallucinations Dimethicone  01/30/2015    Rash Percocet [Oxycodone-acetaminophen]  01/20/2014   Side Effect Other (comments)  
 hallucinations Current Immunizations  Reviewed on 6/21/2018 Name Date Influenza Vaccine 9/18/2014 Pneumococcal Polysaccharide (PPSV-23) 9/18/2014 Reviewed by Leatha Uribe on 6/21/2018 at  8:26 AM  
You Were Diagnosed With   
  
 Codes Comments Controlled type 2 diabetes mellitus without complication, without long-term current use of insulin ([facility]    -  Primary ICD-10-CM: E11.9 ICD-9-CM: 250.00 Chronic systolic HF (heart failure) ([facility])     ICD-10-CM: I50.22 ICD-9-CM: 428.22 Hypertensive heart disease with congestive heart failure, unspecified heart failure type ([facility]     ICD-10-CM: I11.0 ICD-9-CM: 402.91, 428.0 S/P CABG x 4     ICD-10-CM: Z95.1 ICD-9-CM: V45.81 Cerebrovascular disease     ICD-10-CM: I67.9 ICD-9-CM: 437.9 Severe obstructive sleep apnea     ICD-10-CM: G47.33 
ICD-9-CM: 327.23 Obesity (BMI 30-39. 9)     ICD-10-CM: E66.9 ICD-9-CM: 278.00 Dyslipidemia     ICD-10-CM: E78.5 ICD-9-CM: 272.4 Strain of lumbar region, initial encounter     ICD-10-CM: S39.012A ICD-9-CM: 026. 2 Vitals BP Pulse Temp Resp Height(growth percentile) Weight(growth percentile) 118/76 (BP 1 Location: Left arm, BP Patient Position: Sitting) 78 98.1 °F (36.7 °C) (Oral) 16 5' 3\" (1.6 m) 188 lb (85.3 kg) SpO2 BMI Smoking Status 96% 33.3 kg/m2 Former Smoker Vitals History BMI and BSA Data Body Mass Index Body Surface Area  
 33.3 kg/m 2 1.95 m 2 Preferred Pharmacy Pharmacy Name Phone [address] [phone] Your Updated Medication List  
  
   
This list is accurate as of 6/21/18  8:50 AM.  Always use your most recent med list.  
  
  
  
  
 aspirin delayed-release 81 mg tablet Take 81 mg by mouth daily. carvedilol 25 mg tablet Commonly known as:  Thania Finder Take 1 Tab by mouth two (2) times daily (with meals). clopidogrel 75 mg Tab Commonly known as:  PLAVIX TAKE ONE TABLET BY MOUTH ONCE DAILY  
  
 cpap machine kit  
by Does Not Apply route. Overnight CPAP at 16 CWP with ramp and humidifier. Mask: Simplus FF small or mask of choice. Supplies 99 month. Please send compliance data [id number]. Diagnosis DAYANA. AHI 52 RDI 52  
  
 cyclobenzaprine 10 mg tablet Commonly known as:  FLEXERIL Take 1 Tab by mouth nightly as needed for Muscle Spasm(s). furosemide 40 mg tablet Commonly known as:  LASIX TAKE ONE TABLET BY MOUTH ONCE DAILY with potassium chloride  
  
 glyBURIDE 2.5 mg tablet Commonly known as:  Romana Roof Take 1 Tab by mouth daily. lisinopril 5 mg tablet Commonly known as:  PRINIVIL, ZESTRIL  
TAKE ONE TABLET BY MOUTH ONCE DAILY  
  
 nitroglycerin 0.4 mg SL tablet Commonly known as:  NITROSTAT  
1 Tab by SubLINGual route every five (5) minutes as needed for Chest Pain.  
  
 potassium chloride 10 mEq tablet Commonly known as:  KLOR-CON M10  
TAKE ONE TABLET BY MOUTH ONCE DAILY with Lasix  
  
 rosuvastatin 20 mg tablet Commonly known as:  CRESTOR Take 1 Tab by mouth daily. Prescriptions Sent to Pharmacy Refills  
 cyclobenzaprine (FLEXERIL) 10 mg tablet 0 Sig: Take 1 Tab by mouth nightly as needed for Muscle Spasm(s). Class: Normal  
 Pharmacy: [facility] DR MAGGIE WALKER [address] [place] [address] #: [phone] Route: Oral  
  
Follow-up Instructions Return in about 4 months (around 10/21/2018) for annual medicare wellness/routine care and please have 10 hour fasting labs today (06-). To-Do List   
 06/21/2018 Lab:  CBC W/O DIFF   
  
 06/21/2018 Lab:  HEMOGLOBIN A1C W/O EAG   
  
 06/21/2018 Lab:  LIPID PANEL   
  
 06/21/2018 Lab:  METABOLIC PANEL, COMPREHENSIVE   
  
 06/21/2018 Lab:  MICROALBUMIN, UR, RAND W/ MICROALB/CREAT RATIO Patient Instructions A Healthy Lifestyle: Care Instructions Your Care Instructions A healthy lifestyle can help you feel good, stay at a healthy weight, and have plenty of energy for both work and play. A healthy lifestyle is something you can share with your whole family. A healthy lifestyle also can lower your risk for serious health problems, such as high blood pressure, heart disease, and diabetes. You can follow a few steps listed below to improve your health and the health of your family. Follow-up care is a key part of your treatment and safety. Be sure to make and go to all appointments, and call your doctor if you are having problems. It's also a good idea to know your test results and keep a list of the medicines you take. How can you care for yourself at home? · Do not eat too much sugar, fat, or fast foods. You can still have dessert and treats now and then. The goal is moderation. · Start small to improve your eating habits. Pay attention to portion sizes, drink less juice and soda pop, and eat more fruits and vegetables. ¨ Eat a healthy amount of food. A 3-ounce serving of meat, for example, is about the size of a deck of cards. Fill the rest of your plate with vegetables and whole grains. ¨ Limit the amount of soda and sports drinks you have every day. Drink more water when you are thirsty. ¨ Eat at least 5 servings of fruits and vegetables every day. It may seem like a lot, but it is not hard to reach this goal. A serving or helping is 1 piece of fruit, 1 cup of vegetables, or 2 cups of leafy, raw vegetables. Have an apple or some carrot sticks as an afternoon snack instead of a candy bar. Try to have fruits and/or vegetables at every meal. 
· Make exercise part of your daily routine. You may want to start with simple activities, such as walking, bicycling, or slow swimming. Try to be active 30 to 60 minutes every day. You do not need to do all 30 to 60 minutes all at once. For example, you can exercise 3 times a day for 10 or 20 minutes. Moderate exercise is safe for most people, but it is always a good idea to talk to your doctor before starting an exercise program. 
· Keep moving. Myra Mass the lawn, work in the garden, or [employer].  Take the stairs instead of the elevator at work. · If you smoke, quit. People who smoke have an increased risk for heart attack, stroke, cancer, and other lung illnesses. Quitting is hard, but there are ways to boost your chance of quitting tobacco for good. ¨ Use nicotine gum, patches, or lozenges. ¨ Ask your doctor about stop-smoking programs and medicines. ¨ Keep trying. In addition to reducing your risk of diseases in the future, you will notice some benefits soon after you stop using tobacco. If you have shortness of breath or asthma symptoms, they will likely get better within a few weeks after you quit. · Limit how much alcohol you drink. Moderate amounts of alcohol (up to 2 drinks a day for men, 1 drink a day for women) are okay. But drinking too much can lead to liver problems, high blood pressure, and other health problems. Family health If you have a family, there are many things you can do together to improve your health. · Eat meals together as a family as often as possible. · Eat healthy foods. This includes fruits, vegetables, lean meats and dairy, and whole grains. · Include your family in your fitness plan. Most people think of activities such as jogging or tennis as the way to fitness, but there are many ways you and your family can be more active. Anything that makes you breathe hard and gets your heart pumping is exercise. Here are some tips: 
¨ Walk to do errands or to take your child to school or the bus. ¨ Go for a family bike ride after dinner instead of watching TV. Where can you learn more? Go to http://miah-lucy.info/. Enter T381 in the search box to learn more about \"A Healthy Lifestyle: Care Instructions. \" Current as of: May 12, 2017 Content Version: 11.4 © 4452-2812 [employer]ista.  Care instructions adapted under license by [employer] (which disclaims liability or warranty for this information). If you have questions about a medical condition or this instruction, always ask your healthcare professional. [address] any warranty or liability for your use of this information. Learning About Diabetes Food Guidelines Your Care Instructions Meal planning is important to manage diabetes. It helps keep your blood sugar at a target level (which you set with your doctor). You don't have to eat special foods. You can eat what your family eats, including sweets once in a while. But you do have to pay attention to how often you eat and how much you eat of certain foods. You may want to work with a dietitian or a certified diabetes educator (CDE) to help you plan meals and snacks. A dietitian or CDE can also help you lose weight if that is one of your goals. What should you know about eating carbs? Managing the amount of carbohydrate (carbs) you eat is an important part of healthy meals when you have diabetes. Carbohydrate is found in many foods. · Learn which foods have carbs. And learn the amounts of carbs in different foods. ¨ Bread, cereal, pasta, and rice have about 15 grams of carbs in a serving. A serving is 1 slice of bread (1 ounce), ½ cup of cooked cereal, or 1/3 cup of cooked pasta or rice. ¨ Fruits have 15 grams of carbs in a serving. A serving is 1 small fresh fruit, such as an apple or orange; ½ of a banana; ½ cup of cooked or canned fruit; ½ cup of fruit juice; 1 cup of melon or raspberries; or 2 tablespoons of dried fruit. ¨ Milk and no-sugar-added yogurt have 15 grams of carbs in a serving. A serving is 1 cup of milk or 2/3 cup of no-sugar-added yogurt. ¨ Starchy vegetables have 15 grams of carbs in a serving. A serving is ½ cup of mashed potatoes or sweet potato; 1 cup winter squash; ½ of a small baked potato; ½ cup of cooked beans; or ½ cup cooked corn or green peas.  
· Learn how much carbs to eat each day and at each meal. A dietitian or CDE can teach you how to keep track of the amount of carbs you eat. This is called carbohydrate counting. · If you are not sure how to count carbohydrate grams, use the Plate Method to plan meals. It is a good, quick way to make sure that you have a balanced meal. It also helps you spread carbs throughout the day. ¨ Divide your plate by types of foods. Put non-starchy vegetables on half the plate, meat or other protein food on one-quarter of the plate, and a grain or starchy vegetable in the final quarter of the plate. To this you can add a small piece of fruit and 1 cup of milk or yogurt, depending on how many carbs you are supposed to eat at a meal. 
· Try to eat about the same amount of carbs at each meal. Do not \"save up\" your daily allowance of carbs to eat at one meal. 
· Proteins have very little or no carbs per serving. Examples of proteins are beef, chicken, turkey, fish, eggs, tofu, cheese, cottage cheese, and peanut butter. A serving size of meat is 3 ounces, which is about the size of a deck of cards. Examples of meat substitute serving sizes (equal to 1 ounce of meat) are 1/4 cup of cottage cheese, 1 egg, 1 tablespoon of peanut butter, and ½ cup of tofu. How can you eat out and still eat healthy? · Learn to estimate the serving sizes of foods that have carbohydrate. If you measure food at home, it will be easier to estimate the amount in a serving of restaurant food. · If the meal you order has too much carbohydrate (such as potatoes, corn, or baked beans), ask to have a low-carbohydrate food instead. Ask for a salad or green vegetables. · If you use insulin, check your blood sugar before and after eating out to help you plan how much to eat in the future. · If you eat more carbohydrate at a meal than you had planned, take a walk or do other exercise. This will help lower your blood sugar. What else should you know? · Limit saturated fat, such as the fat from meat and dairy products.  This is a healthy choice because people who have diabetes are at higher risk of heart disease. So choose lean cuts of meat and nonfat or low-fat dairy products. Use olive or canola oil instead of butter or shortening when cooking. · Don't skip meals. Your blood sugar may drop too low if you skip meals and take insulin or certain medicines for diabetes. · Check with your doctor before you drink alcohol. Alcohol can cause your blood sugar to drop too low. Alcohol can also cause a bad reaction if you take certain diabetes medicines. Follow-up care is a key part of your treatment and safety. Be sure to make and go to all appointments, and call your doctor if you are having problems. It's also a good idea to know your test results and keep a list of the medicines you take. Where can you learn more? Go to http://miah-lucy.info/. Enter S662 in the search box to learn more about \"Learning About Diabetes Food Guidelines. \" Current as of: March 13, 2017 Content Version: 11.4 © 7870-5285 MynewMD. Care instructions adapted under license by [employer] (which disclaims liability or warranty for this information). If you have questions about a medical condition or this instruction, always ask your healthcare professional. [place] [address] any warranty or liability for your use of this information. Back Pain: Care Instructions Your Care Instructions Back pain has many possible causes. It is often related to problems with muscles and ligaments of the back. It may also be related to problems with the nerves, discs, or bones of the back. Moving, lifting, standing, sitting, or sleeping in an awkward way can strain the back. Sometimes you don't notice the injury until later. Arthritis is another common cause of back pain.  
Although it may hurt a lot, back pain usually improves on its own within several weeks. Most people recover in 12 weeks or less. Using good home treatment and being careful not to stress your back can help you feel better sooner. Follow-up care is a key part of your treatment and safety. Be sure to make and go to all appointments, and call your doctor if you are having problems. It's also a good idea to know your test results and keep a list of the medicines you take. How can you care for yourself at home? · Sit or lie in positions that are most comfortable and reduce your pain. Try one of these positions when you lie down: ¨ Lie on your back with your knees bent and supported by large pillows. ¨ Lie on the floor with your legs on the seat of a sofa or chair. [place] on your side with your knees and hips bent and a pillow between your legs. ¨ Lie on your stomach if it does not make pain worse. · Do not sit up in bed, and avoid soft couches and twisted positions. Bed rest can help relieve pain at first, but it delays healing. Avoid bed rest after the first day of back pain. · Change positions every 30 minutes. If you must sit for long periods of time, take breaks from sitting. Get up and walk around, or lie in a comfortable position. · Try using a heating pad on a low or medium setting for 15 to 20 minutes every 2 or 3 hours. Try a warm shower in place of one session with the heating pad. · You can also try an ice pack for 10 to 15 minutes every 2 to 3 hours. Put a thin cloth between the ice pack and your skin. · Take pain medicines exactly as directed. ¨ If the doctor gave you a prescription medicine for pain, take it as prescribed. ¨ If you are not taking a prescription pain medicine, ask your doctor if you can take an over-the-counter medicine. · Take short walks several times a day. You can start with 5 to 10 minutes, 3 or 4 times a day, and work up to longer walks. Walk on level surfaces and avoid hills and stairs until your back is better. · Return to work and other activities as soon as you can. Continued rest without activity is usually not good for your back. · To prevent future back pain, do exercises to stretch and strengthen your back and stomach. Learn how to use good posture, safe lifting techniques, and proper body mechanics. When should you call for help? Call your doctor now or seek immediate medical care if: 
? · You have new or worsening numbness in your legs. ? · You have new or worsening weakness in your legs. (This could make it hard to stand up.) ? · You lose control of your bladder or bowels. ? Watch closely for changes in your health, and be sure to contact your doctor if: 
? · Your pain gets worse. ? · You are not getting better after 2 weeks. Where can you learn more? Go to http://miah-lucy.info/. Enter A181 in the search box to learn more about \"Back Pain: Care Instructions. \" Current as of: March 21, 2017 Content Version: 11.4 © 5487-8517 Netatmo. Care instructions adapted under license by [employer] (which disclaims liability or warranty for this information). If you have questions about a medical condition or this instruction, always ask your healthcare professional. [place] [address] any warranty or liability for your use of this information. Learning About Relief for Back Pain What is back tension and strain? Back strain happens when you overstretch, or pull, a muscle in your back. You may hurt your back in an accident or when you exercise or lift something. Most back pain will get better with rest and time. You can take care of yourself at home to help your back heal. 
What can you do first to relieve back pain? When you first feel back pain, try these steps: 
· Walk. Take a short walk (10 to 20 minutes) on a level surface (no slopes, hills, or stairs) every 2 to 3 hours.  Walk only distances you can manage without pain, especially leg pain. · Relax. Find a comfortable position for rest. Some people are comfortable on the floor or a medium-firm bed with a small pillow under their head and another under their knees. Some people prefer to lie on their side with a pillow between their knees. Don't stay in one position for too long. · Try heat or ice. Try using a heating pad on a low or medium setting, or take a warm shower, for 15 to 20 minutes every 2 to 3 hours. Or you can buy single-use heat wraps that last up to 8 hours. You can also try an ice pack for 10 to 15 minutes every 2 to 3 hours. You can use an ice pack or a bag of frozen vegetables wrapped in a thin towel. There is not strong evidence that either heat or ice will help, but you can try them to see if they help. You may also want to try switching between heat and cold. · Take pain medicine exactly as directed. ¨ If the doctor gave you a prescription medicine for pain, take it as prescribed. ¨ If you are not taking a prescription pain medicine, ask your doctor if you can take an over-the-counter medicine. What else can you do? · Stretch and exercise. Exercises that increase flexibility may relieve your pain and make it easier for your muscles to keep your spine in a good, neutral position. And don't forget to keep walking. · Do self-massage. You can use self-massage to unwind after work or school or to energize yourself in the morning. You can easily massage your feet, hands, or neck. Self-massage works best if you are in comfortable clothes and are sitting or lying in a comfortable position. Use oil or lotion to massage bare skin. · Reduce stress. Back pain can lead to a vicious [place]: Distress about the pain tenses the muscles in your back, which in turn causes more pain. Learn how to relax your mind and your muscles to lower your stress. Where can you learn more? Go to http://miah-lucy.info/. Enter B633 in the search box to learn more about \"Learning About Relief for Back Pain. \" Current as of: March 21, 2017 Content Version: 11.4 © 4438-3203 Healthwise, En[employer]ed. Care instructions adapted under license by [employer] (which disclaims liability or warranty for this information). If you have questions about a medical condition or this instruction, always ask your healthcare professional. [place] [address] any warranty or liability for your use of this information. Follow up 4 months for annual medicare wellness/routine care and please have 10 hour fasting labs today (06-) Please provide this summary of care documentation to your next provider. Your primary care clinician is listed as Brynn Robertson. If you have any questions after today's visit, please call [phone].

## 2018-06-22 LAB
ALBUMIN SERPL-MCNC: 4.4 G/DL (ref 3.6–4.8)
ALBUMIN/CREAT UR: <12.6 MG/G CREAT (ref 0–30)
ALBUMIN/GLOB SERPL: 1.6 {RATIO} (ref 1.2–2.2)
ALP SERPL-CCNC: 35 IU/L (ref 39–117)
ALT SERPL-CCNC: 13 IU/L (ref 0–44)
AST SERPL-CCNC: 17 IU/L (ref 0–40)
BILIRUB SERPL-MCNC: 0.4 MG/DL (ref 0–1.2)
BUN SERPL-MCNC: 27 MG/DL (ref 8–27)
BUN/CREAT SERPL: 17 (ref 10–24)
CALCIUM SERPL-MCNC: 9.4 MG/DL (ref 8.6–10.2)
CHLORIDE SERPL-SCNC: 102 MMOL/L (ref 96–106)
CHOLEST SERPL-MCNC: 158 MG/DL (ref 100–199)
CO2 SERPL-SCNC: 26 MMOL/L (ref 20–29)
CREAT SERPL-MCNC: 1.58 MG/DL (ref 0.76–1.27)
CREAT UR-MCNC: 23.8 MG/DL
ERYTHROCYTE [DISTWIDTH] IN BLOOD BY AUTOMATED COUNT: 14.5 % (ref 12.3–15.4)
GFR SERPLBLD CREATININE-BSD FMLA CKD-EPI: 46 ML/MIN/1.73
GFR SERPLBLD CREATININE-BSD FMLA CKD-EPI: 53 ML/MIN/1.73
GLOBULIN SER CALC-MCNC: 2.8 G/DL (ref 1.5–4.5)
GLUCOSE SERPL-MCNC: 93 MG/DL (ref 65–99)
HBA1C MFR BLD: 6.1 % (ref 4.8–5.6)
HCT VFR BLD AUTO: 42 % (ref 37.5–51)
HDLC SERPL-MCNC: 33 MG/DL
HGB BLD-MCNC: 13.2 G/DL (ref 13–17.7)
INTERPRETATION, 910389: NORMAL
INTERPRETATION: NORMAL
LDLC SERPL CALC-MCNC: 52 MG/DL (ref 0–99)
Lab: NORMAL
MCH RBC QN AUTO: 27.4 PG (ref 26.6–33)
MCHC RBC AUTO-ENTMCNC: 31.4 G/DL (ref 31.5–35.7)
MCV RBC AUTO: 87 FL (ref 79–97)
MICROALBUMIN UR-MCNC: <3 UG/ML
PDF IMAGE, 910387: NORMAL
PLATELET # BLD AUTO: 198 X10E3/UL (ref 150–379)
POTASSIUM SERPL-SCNC: 4.9 MMOL/L (ref 3.5–5.2)
PROT SERPL-MCNC: 7.2 G/DL (ref 6–8.5)
RBC # BLD AUTO: 4.82 X10E6/UL (ref 4.14–5.8)
SODIUM SERPL-SCNC: 142 MMOL/L (ref 134–144)
TRIGL SERPL-MCNC: 364 MG/DL (ref 0–149)
VLDLC SERPL CALC-MCNC: 73 MG/DL (ref 5–40)
WBC # BLD AUTO: 9 X10E3/UL (ref 3.4–10.8)

## 2018-06-22 NOTE — PROGRESS NOTES
Nurse to advise that his diabetes is controlled. Advises that his kidney functions show some effects of his diabetes and hypertension. We have to focus on keeping his blood pressure and diabetes under control. He can see a kidney specialist if he wishes to get further advice on his kidneys.

## 2018-06-25 NOTE — PROGRESS NOTES
Patient identifiers verified. Patient advised that per Dr. Evonnie Hammans, his diabetes is controlled. Advises that his kidney functions show some effects of his diabetes and hypertension. We have to focus on keeping his blood pressure and diabetes under control.   Patient is unsure if he wants to see a nephrologist.

## 2018-08-20 RX ORDER — GLYBURIDE 2.5 MG/1
TABLET ORAL
Qty: 90 TAB | Refills: 1 | Status: SHIPPED | OUTPATIENT
Start: 2018-08-20 | End: 2019-03-12 | Stop reason: SDUPTHER

## 2018-11-21 RX ORDER — POTASSIUM CHLORIDE 750 MG/1
TABLET, EXTENDED RELEASE ORAL
Qty: 90 TAB | Refills: 3 | Status: SHIPPED | OUTPATIENT
Start: 2018-11-21 | End: 2018-11-27 | Stop reason: SDUPTHER

## 2018-11-28 RX ORDER — POTASSIUM CHLORIDE 750 MG/1
TABLET, EXTENDED RELEASE ORAL
Qty: 90 TAB | Refills: 3 | Status: SHIPPED | OUTPATIENT
Start: 2018-11-28 | End: 2021-04-27 | Stop reason: SDUPTHER

## 2019-01-07 ENCOUNTER — OFFICE VISIT (OUTPATIENT)
Dept: CARDIOLOGY CLINIC | Age: 64
End: 2019-01-07

## 2019-01-07 VITALS
WEIGHT: 188 LBS | HEART RATE: 74 BPM | SYSTOLIC BLOOD PRESSURE: 130 MMHG | HEIGHT: 63 IN | OXYGEN SATURATION: 97 % | DIASTOLIC BLOOD PRESSURE: 76 MMHG | BODY MASS INDEX: 33.31 KG/M2

## 2019-01-07 DIAGNOSIS — I25.10 CAD S/P PERCUTANEOUS CORONARY ANGIOPLASTY: Primary | ICD-10-CM

## 2019-01-07 DIAGNOSIS — E78.5 DYSLIPIDEMIA: ICD-10-CM

## 2019-01-07 DIAGNOSIS — Z98.61 CAD S/P PERCUTANEOUS CORONARY ANGIOPLASTY: Primary | ICD-10-CM

## 2019-01-07 NOTE — PROGRESS NOTES
HISTORY OF PRESENT ILLNESS Leanna Brown is a 61 y.o. male. HPI He has been feeling quite well. He denies any cardiac symptoms. He denies chest pain, dyspnea, orthopnea, PND, palpitations, dizziness or syncope. He denies any symptoms of TIA or amaurosis fugax. He has history of known coronary artery disease.  He underwent CABG X four in January of 2014, which consisted of:   
1. LIMA to the proximal LAD              
2. Single SVG to the distal LAD              
3. Sequential SVG to the RPDA and OM2.   
  
He was readmitted with a non-STEMI myocardial infarction in January of 2015 and underwent cardiac catheterization, which demonstrated:   
1. Patent LIMA to the LAD               
2. Total occlusion of the SVG to the LAD               
3. 95% ostial stenosis of the sequential SVG to the RPDA and obtuse marginal branch 2 with 70% OM just after the anastomosis.   
  
He subsequently underwent successful stenting of the vein graft to the RPDA and OM along with OM branch with a Xience drug-eluting stent.   
  
He has history of hypertension, diabetes mellitus, and dyslipidemia. [facility] does have history of metabolic syndrome with high triglycerides and low HDL along with diabetes and hypertension.   
  
His last echocardiogram in March of 2014 demonstrated mild to moderate LV dysfunction with EF in the 40-45% range. Sarah Ran was no significant valvular dysfunction. Sarah Ran was no evidence of significant pulmonary hypertension.    
 
Review of Systems Constitutional: Negative for malaise/fatigue and weight loss. HENT: Negative for hearing loss. Eyes: Negative for blurred vision and double vision. Respiratory: Negative for shortness of breath. Cardiovascular: Negative for chest pain, palpitations, orthopnea, claudication, leg swelling and PND. Gastrointestinal: Negative for blood in stool, heartburn and melena. Genitourinary: Negative for dysuria, frequency, hematuria and urgency. Musculoskeletal: Negative for back pain and joint pain. Skin: Negative for itching and rash. Neurological: Negative for dizziness, loss of consciousness and weakness. Psychiatric/Behavioral: Negative for depression and memory loss. Physical Exam  
Constitutional: He is oriented to person, place, and time. He appears well-developed. HENT:  
Head: Normocephalic and atraumatic. Eyes: Conjunctivae are normal. Pupils are equal, round, and reactive to light. Neck: Normal range of motion. No JVD present. Cardiovascular: Normal rate, regular rhythm, S1 normal and S2 normal.  No extrasystoles are present. Exam reveals no gallop and no friction rub. No murmur heard. Pulses: 
     Carotid pulses are 3+ on the right side, and 3+ on the left side. Pulmonary/Chest: Effort normal. He has no rales. Abdominal: Soft. There is no tenderness. Musculoskeletal: He exhibits no edema. Neurological: He is alert and oriented to person, place, and time. No cranial nerve deficit. Skin: Skin is warm and dry. Psychiatric: He has a normal mood and affect. His behavior is normal.  
 
Visit Vitals /76 Pulse 74 Ht 5' 3\" (1.6 m) Wt 85.3 kg (188 lb) SpO2 97% BMI 33.30 kg/m² Past Medical History:  
Diagnosis Date  BPPV (benign paroxysmal positional vertigo) 5/26/2010  CABG x 4   
 CAD (coronary artery disease), native coronary artery   
 cath (jan 2014): 40% LM, pLAD 85%, dLAD 70%, dLCx 80%, mRCA 95%. s/p CABG x 4 (LIMA->D, SVG->dLAD, Seq SVG -> RPDA & OM2)  Cardiac catheterization 01/10/2014 pLM 40%. pLAD 85%. dCX 80%. mRCA 95%. CABG recommended.  Cardiac echocardiogram 09/03/2014 Tech difficult. EF 40-45%. Mild, diffuse hypk. Mild LVH. Indeterminate diastolic fx. Mild LAE. No significant valvular heart disease.  Carotid duplex 01/13/2014 Mod 50-69% VALENTIN stenosis. Mild 6-60% LICA stenosis. Occluded right vertebral.     
 Carpal tunnel syndrome  Cerebrovascular disease Moderate VALENTIN, mild LICA, occluded R vertebral (Doppler 2014)  Chest pain, musculoskeletal   
 post CABG  
 Diabetes ([facility]  Dyslipidemia  H/O tobacco use, presenting hazards to health   
 quit   Headache(784.0)   
 cluster  [facility]  Hiatal hernia  Hypercholesterolemia  Hypertensive heart disease with congestive heart failure ([facility]  Internal carotid artery stenosis 2010  Obesity (BMI 30-39. 9)  S/P colonoscopic polypectomy 2017  
 tubular adenoma, f/u 5 years  Sleep apnea   
 does not use cpap machine  Stroke ([facility] 09 TIA (no residuals)  Systolic HF (heart failure) ([facility] LV EF 35% (echo 2014). Mean LA pressure 32 (2014)  Vertigo Social History Socioeconomic History  Marital status:  Spouse name: Not on file  Number of children: Not on file  Years of education: Not on file  Highest education level: Not on file Social Needs  Financial resource strain: Not on file  Food insecurity - worry: Not on file  Food insecurity - inability: Not on file  Transportation needs - medical: Not on file  Transportation needs - non-medical: Not on file Occupational History  Occupation: retired Tobacco Use  Smoking status: Former Smoker Packs/day: 0.25 Years: 10.00 Pack years: 2.50 Types: Cigarettes Last attempt to quit: 2014 Years since quittin.9  Smokeless tobacco: Never Used  Tobacco comment:  less then 5 ciggerates a week Substance and Sexual Activity  Alcohol use: Yes Comment: beer occasionally  Drug use: No  
 Sexual activity: Not Currently Partners: Female Other Topics Concern  Not on file Social History Narrative  Not on file Family History Problem Relation Age of Onset  Cancer Father   
     prostate  Heart Disease Father  Heart Disease Brother   
     age 48  Other Son   
     Brain damage  Heart Attack Brother Past Surgical History:  
Procedure Laterality Date  COLONOSCOPY N/A 9/7/2017 COLONOSCOPY with polypectomy performed by Rosio Bunn MD at [address] [place] [address] Street GRAFT  1/14/14 LIMA to the D1, and GSV to the distal LAD, and anotherGSV sequentially to the PDA of the RCA and to theOM2 and ascending aortic endarterectomy, Dr. Carrol Chapa, 1/14/14.  HX CORONARY STENT PLACEMENT    
 HX UROLOGICAL    
 repair of spermatocele Current Outpatient Medications Medication Sig Dispense Refill  potassium chloride (KLOR-CON M10) 10 mEq tablet TAKE ONE TABLET BY MOUTH ONCE DAILY WITH  LASIX 90 Tab 3  
 glyBURIDE (DIABETA) 2.5 mg tablet TAKE 1 TABLET BY MOUTH ONCE DAILY 90 Tab 1  carvedilol (COREG) 25 mg tablet Take 1 Tab by mouth two (2) times daily (with meals). 90 Tab 3  
 rosuvastatin (CRESTOR) 20 mg tablet Take 1 Tab by mouth daily. 30 Tab 6  clopidogrel (PLAVIX) 75 mg tab TAKE ONE TABLET BY MOUTH ONCE DAILY 90 Tab 3  
 lisinopril (PRINIVIL, ZESTRIL) 5 mg tablet TAKE ONE TABLET BY MOUTH ONCE DAILY 30 Tab 11  
 aspirin delayed-release 81 mg tablet Take 81 mg by mouth daily.  furosemide (LASIX) 40 mg tablet TAKE ONE TABLET BY MOUTH ONCE DAILY with potassium chloride 90 Tab 3  
 nitroglycerin (NITROSTAT) 0.4 mg SL tablet 1 Tab by SubLINGual route every five (5) minutes as needed for Chest Pain. 1 Bottle 0  
 cpap machine kit by Does Not Apply route. Overnight CPAP at 16 CWP with ramp and humidifier. Mask: Simplus FF small or mask of choice. Supplies 99 month. Please send compliance data [id number]. Diagnosis DAYANA. AHI 52 RDI 52 1 Kit 0 EKG: unchanged from previous tracings, normal sinus rhythm, nonspecific ST and T waves changes. ASSESSMENT and PLAN Encounter Diagnoses Name Primary?  CAD S/P percutaneous coronary angioplasty Yes Comment: CABGx4 Jan.2014,Stent SVG to RPDA,Obtuse marginal2  Dyslipidemia He has been doing well. He has had no symptoms to indicate angina or cardiac decompensation. His blood pressure has been under control. His cholesterol profile was improved but I would like to keep his LDL down further. He was advised to tighten up his diet. We have increased his Crestor to 20 mg from 10 mg.

## 2019-01-07 NOTE — PROGRESS NOTES
Karina Sadler presents today for Chief Complaint Patient presents with  Follow-up 6 month - no cardiac complaints Karina Sadler preferred language for health care discussion is english/other. Is someone accompanying this pt? No 
 
Is the patient using any DME equipment during OV? No 
 
Depression Screening: PHQ over the last two weeks 4/23/2018 Little interest or pleasure in doing things Not at all Feeling down, depressed, irritable, or hopeless Not at all Total Score PHQ 2 0 Learning Assessment: 
Learning Assessment 8/12/2014 PRIMARY LEARNER Patient HIGHEST LEVEL OF EDUCATION - PRIMARY LEARNER  -  
BARRIERS PRIMARY LEARNER -  
CO-LEARNER CAREGIVER -  
PRIMARY LANGUAGE ENGLISH  NEED -  
LEARNER PREFERENCE PRIMARY DEMONSTRATION  
ANSWERED BY patient RELATIONSHIP SELF Abuse Screening: 
Abuse Screening Questionnaire 10/16/2017 Do you ever feel afraid of your partner? Jean Paul Leone Are you in a relationship with someone who physically or mentally threatens you? Jean Paul Leone Is it safe for you to go home? Avis Carey Fall Risk Fall Risk Assessment, last 12 mths 10/16/2017 Able to walk? Yes Fall in past 12 months? No  
 
 
Pt currently taking Antiplatelet therapy? Plavix and ASA Coordination of Care: 1. Have you been to the ER, urgent care clinic since your last visit? Hospitalized since your last visit? No 
 
2. Have you seen or consulted any other health care providers outside of the [address] [place] [address] since your last visit? Include any pap smears or colon screening.  No

## 2019-03-07 RX ORDER — GLYBURIDE 2.5 MG/1
TABLET ORAL
Qty: 90 TAB | Refills: 1 | OUTPATIENT
Start: 2019-03-07

## 2019-03-07 NOTE — TELEPHONE ENCOUNTER
Patient advised that refill has been refused and he is overdue for follow up. He has been scheduled for 3/12/19.

## 2019-03-12 ENCOUNTER — OFFICE VISIT (OUTPATIENT)
Dept: FAMILY MEDICINE CLINIC | Age: 64
End: 2019-03-12

## 2019-03-12 VITALS
RESPIRATION RATE: 16 BRPM | DIASTOLIC BLOOD PRESSURE: 54 MMHG | HEIGHT: 63 IN | WEIGHT: 189 LBS | TEMPERATURE: 98.9 F | SYSTOLIC BLOOD PRESSURE: 96 MMHG | BODY MASS INDEX: 33.49 KG/M2 | HEART RATE: 78 BPM | OXYGEN SATURATION: 93 %

## 2019-03-12 VITALS
OXYGEN SATURATION: 93 % | WEIGHT: 189 LBS | DIASTOLIC BLOOD PRESSURE: 54 MMHG | RESPIRATION RATE: 16 BRPM | HEIGHT: 63 IN | BODY MASS INDEX: 33.49 KG/M2 | HEART RATE: 78 BPM | SYSTOLIC BLOOD PRESSURE: 96 MMHG | TEMPERATURE: 98.9 F

## 2019-03-12 DIAGNOSIS — Z71.89 ADVANCED DIRECTIVES, COUNSELING/DISCUSSION: ICD-10-CM

## 2019-03-12 DIAGNOSIS — E66.9 OBESITY (BMI 30-39.9): ICD-10-CM

## 2019-03-12 DIAGNOSIS — Z13.31 SCREENING FOR DEPRESSION: ICD-10-CM

## 2019-03-12 DIAGNOSIS — I11.0 HYPERTENSIVE HEART DISEASE WITH CONGESTIVE HEART FAILURE, UNSPECIFIED HEART FAILURE TYPE (HCC): ICD-10-CM

## 2019-03-12 DIAGNOSIS — E11.9 CONTROLLED TYPE 2 DIABETES MELLITUS WITHOUT COMPLICATION, WITHOUT LONG-TERM CURRENT USE OF INSULIN (HCC): Primary | ICD-10-CM

## 2019-03-12 DIAGNOSIS — Z95.1 S/P CABG X 4: ICD-10-CM

## 2019-03-12 DIAGNOSIS — E78.5 DYSLIPIDEMIA: ICD-10-CM

## 2019-03-12 DIAGNOSIS — I50.22 CHRONIC SYSTOLIC HF (HEART FAILURE) (HCC): ICD-10-CM

## 2019-03-12 DIAGNOSIS — Z00.00 MEDICARE ANNUAL WELLNESS VISIT, SUBSEQUENT: ICD-10-CM

## 2019-03-12 DIAGNOSIS — J06.9 VIRAL UPPER RESPIRATORY TRACT INFECTION: ICD-10-CM

## 2019-03-12 DIAGNOSIS — Z12.5 PROSTATE CANCER SCREENING: ICD-10-CM

## 2019-03-12 DIAGNOSIS — G47.33 SEVERE OBSTRUCTIVE SLEEP APNEA: ICD-10-CM

## 2019-03-12 LAB — HBA1C MFR BLD HPLC: 6.2 %

## 2019-03-12 RX ORDER — GLYBURIDE 2.5 MG/1
TABLET ORAL
Qty: 90 TAB | Refills: 1 | Status: SHIPPED | OUTPATIENT
Start: 2019-03-12 | End: 2019-09-16 | Stop reason: SDUPTHER

## 2019-03-12 NOTE — ACP (ADVANCE CARE PLANNING)
Advance Care Planning    Advance Care Planning (ACP) Provider Note - Comprehensive     Date of ACP Conversation: 03/12/19  Persons included in Conversation:  patient  Length of ACP Conversation in minutes:  16 minutes    Authorized Decision Maker (if patient is incapable of making informed decisions): This person is:  He mentions a daughter that he is close to that would be likely his healthcare POA.           General ACP for ALL Patients with Decision Making Capacity:   Importance of advance care planning, including choosing a healthcare agent to communicate patient's healthcare decisions if patient lost the ability to make decisions, such as after a sudden illness or accident  Understanding of the healthcare agent role was assessed and information provided    Review of Existing Advance Directive:  None currently exists    For Serious or Chronic Illness:  No known illness    Interventions Provided:  Referral made for ACP follow-up assistance to:  nurse  Recommended review of completed ACP document annually or upon change in health status

## 2019-03-12 NOTE — PATIENT INSTRUCTIONS
A Healthy Lifestyle: Care Instructions  Your Care Instructions    A healthy lifestyle can help you feel good, stay at a healthy weight, and have plenty of energy for both work and play. A healthy lifestyle is something you can share with your whole family. A healthy lifestyle also can lower your risk for serious health problems, such as high blood pressure, heart disease, and diabetes. You can follow a few steps listed below to improve your health and the health of your family. Follow-up care is a key part of your treatment and safety. Be sure to make and go to all appointments, and call your doctor if you are having problems. It's also a good idea to know your test results and keep a list of the medicines you take. How can you care for yourself at home? · Do not eat too much sugar, fat, or fast foods. You can still have dessert and treats now and then. The goal is moderation. · Start small to improve your eating habits. Pay attention to portion sizes, drink less juice and soda pop, and eat more fruits and vegetables. ? Eat a healthy amount of food. A 3-ounce serving of meat, for example, is about the size of a deck of cards. Fill the rest of your plate with vegetables and whole grains. ? Limit the amount of soda and sports drinks you have every day. Drink more water when you are thirsty. ? Eat at least 5 servings of fruits and vegetables every day. It may seem like a lot, but it is not hard to reach this goal. A serving or helping is 1 piece of fruit, 1 cup of vegetables, or 2 cups of leafy, raw vegetables. Have an apple or some carrot sticks as an afternoon snack instead of a candy bar. Try to have fruits and/or vegetables at every meal.  · Make exercise part of your daily routine. You may want to start with simple activities, such as walking, bicycling, or slow swimming. Try to be active 30 to 60 minutes every day. You do not need to do all 30 to 60 minutes all at once.  For example, you can exercise 3 times a day for 10 or 20 minutes. Moderate exercise is safe for most people, but it is always a good idea to talk to your doctor before starting an exercise program.  · Keep moving. Edmund Fuchsome the lawn, work in the garden, or [place]. Take the stairs instead of the elevator at work. · If you smoke, quit. People who smoke have an increased risk for heart attack, stroke, cancer, and other lung illnesses. Quitting is hard, but there are ways to boost your chance of quitting tobacco for good. ? Use nicotine gum, patches, or lozenges. ? Ask your doctor about stop-smoking programs and medicines. ? Keep trying. In addition to reducing your risk of diseases in the future, you will notice some benefits soon after you stop using tobacco. If you have shortness of breath or asthma symptoms, they will likely get better within a few weeks after you quit. · Limit how much alcohol you drink. Moderate amounts of alcohol (up to 2 drinks a day for men, 1 drink a day for women) are okay. But drinking too much can lead to liver problems, high blood pressure, and other health problems. Family health  If you have a family, there are many things you can do together to improve your health. · Eat meals together as a family as often as possible. · Eat healthy foods. This includes fruits, vegetables, lean meats and dairy, and whole grains. · Include your family in your fitness plan. Most people think of activities such as jogging or tennis as the way to fitness, but there are many ways you and your family can be more active. Anything that makes you breathe hard and gets your heart pumping is exercise. Here are some tips:  ? Walk to do errands or to take your child to school or the bus.  ? Go for a family bike ride after dinner instead of watching TV. Where can you learn more? Go to http://miah-lucy.info/. Enter K650 in the search box to learn more about \"A Healthy Lifestyle: Care Instructions. \"  Current as of: September 11, 2018  Content Version: 11.9  © 1683-2535 [employer]. Care instructions adapted under license by [employer] (which disclaims liability or warranty for this information). If you have questions about a medical condition or this instruction, always ask your healthcare professional. [address] any warranty or liability for your use of this information.

## 2019-03-12 NOTE — PROGRESS NOTES
SUBJECTIVE  Chief Complaint   Patient presents with    Diabetes    Cholesterol Problem    Medication Refill      Here for routine follow-up. He has had URI symptoms for the past few days. Mainly a cough that is minimally productive of clear sputum. He has no chest pains or dyspnea / wheezing. No sinus pain. No fevers. Taking Nyquil and getting better. Overall he states he is doing well. He is taking glyburide. Had a diabetic eye exam in 2017 and does not want to go back due to the co-pay of $40. Denies any visual changes. Does not report any polyuria or polydipsia. He is currently doing well from a cardiac standpoint without any cardiac chest pain or dyspnea. He has been compliant with Plavix. He is tolerating his statin without myalgias. He has sleep apnea but cannot wear CPAP because he says it suffocates him. OBJECTIVE    Blood pressure 96/54, pulse 78, temperature 98.9 °F (37.2 °C), temperature source Oral, resp. rate 16, height 5' 3\" (1.6 m), weight 189 lb (85.7 kg), SpO2 93 %. General:  alert, cooperative, well appearing, in no apparent distress. Heart: Normal S1S2, RRR. Chest: Clear, no w/r/r. Skin:  No pedal lesions. Neuro:  Monofilament testing is intact. No motor or sensory deficits. Foot exam  was performed. No deformities. Sensory and motor testing was assessed - intact. Pedal pulse(s) was assessed - intact. No pedal lesions. Psych: normal affect. Mood good. Oriented x 3. Judgement and insight intact. Results for orders placed or performed in visit on 03/12/19   AMB POC HEMOGLOBIN A1C   Result Value Ref Range    Hemoglobin A1c (POC) 6.2 %     ASSESSMENT / PLAN    ICD-10-CM ICD-9-CM    1.  Controlled type 2 diabetes mellitus without complication, without long-term current use of insulin (HCC) E11.9 250.00 AMB POC HEMOGLOBIN A1C      CBC W/O DIFF      METABOLIC PANEL, COMPREHENSIVE      LIPID PANEL      HEMOGLOBIN A1C W/O EAG MICROALBUMIN, UR, RAND W/ MICROALB/CREAT RATIO   2. Chronic systolic HF (heart failure) (HCC) I50.22 428.22 CBC W/O DIFF      METABOLIC PANEL, COMPREHENSIVE   3. Hypertensive heart disease with congestive heart failure, unspecified heart failure type (HCC) I11.0 402.91      428.0    4. Dyslipidemia E78.5 272.4 LIPID PANEL   5. S/P CABG x 4 Z95.1 V45.81    6. Severe obstructive sleep apnea G47.33 327.23    7. Obesity (BMI 30-39. 9) E66.9 278.00    8. Prostate cancer screening Z12.5 V76.44 PSA SCREENING (SCREENING)   9. Viral upper respiratory tract infection J06.9 465.9      Diabetes -  A1c today shows adequate control. I have counseled him on diet lower in carbs. Cont glyburide. Advised regular foot checks and annual eye exams. He is declining an eye referral due to cost.    CAD / chronic CHF (compensated) / hypertensive heart disease / dyslipidemia - under care of cardiology. Continue current care. Refills of meds as needed. Checking LFTs and lipids with next set of labs. Sleep apnea - he is not tolerating CPAP. He can see his sleep specialist for this. Obesity - Diabetic dieting. Viral URI - monitor to resolution. All chart history elements were reviewed by me at the time of the visit even though marked at time of note closure. Patient understands our medical plan. Patient has provided input and agrees with goals. Alternatives have been explained and offered. All questions answered. The patient is to call if condition worsens or fails to improve. Follow-up Disposition:  Return in about 6 months (around 9/12/2019).

## 2019-03-12 NOTE — PATIENT INSTRUCTIONS
Medicare Wellness Visit, Male    The best way to live healthy is to have a lifestyle where you eat a well-balanced diet, exercise regularly, limit alcohol use, and quit all forms of tobacco/nicotine, if applicable. Regular preventive services are another way to keep healthy. Preventive services (vaccines, screening tests, monitoring & exams) can help personalize your care plan, which helps you manage your own care. Screening tests can find health problems at the earliest stages, when they are easiest to treat. [address] [facility] follows the current, evidence-based guidelines published by the [facility] Gennaro Javan ([facility]) when recommending preventive services for our patients. Because we follow these guidelines, sometimes recommendations change over time as research supports it. (For example, a prostate screening blood test is no longer routinely recommended for men with no symptoms.)  Of course, you and your doctor may decide to screen more often for some diseases, based on your risk and co-morbidities (chronic disease you are already diagnosed with). Preventive services for you include:  - Medicare offers their members a free annual wellness visit, which is time for you and your primary care provider to discuss and plan for your preventive service needs. Take advantage of this benefit every year!  -All adults over age 72 should receive the recommended pneumonia vaccines. Current USPSTF guidelines recommend a series of two vaccines for the best pneumonia protection.   -All adults should have a flu vaccine yearly and an ECG.  All adults age 61 and older should receive a shingles vaccine once in their lifetime.    -All adults age 38-68 who are overweight should have a diabetes screening test once every three years.   -Other screening tests & preventive services for persons with diabetes include: an eye exam to screen for diabetic retinopathy, a kidney function test, a foot exam, and stricter control over your cholesterol.   -Cardiovascular screening for adults with routine risk involves an electrocardiogram (ECG) at intervals determined by the provider.   -Colorectal cancer screening should be done for adults age 54-65 with no increased risk factors for colorectal cancer. There are a number of acceptable methods of screening for this type of cancer. Each test has its own benefits and drawbacks. Discuss with your provider what is most appropriate for you during your annual wellness visit. The different tests include: colonoscopy (considered the best screening method), a fecal occult blood test, a fecal DNA test, and sigmoidoscopy.  -All adults born between [facility] should be screened once for Hepatitis C.  -An Abdominal Aortic Aneurysm (AAA) Screening is recommended for men age 73-68 who has ever smoked in their lifetime.      Here is a list of your current Health Maintenance items (your personalized list of preventive services) with a due date:  Health Maintenance Due   Topic Date Due    Shingles Vaccine (1 of 2) 12/10/2005    Flu Vaccine  08/01/2018    Annual Well Visit  10/17/2018    Hemoglobin A1C    12/21/2018    Eye Exam  02/16/2019

## 2019-03-12 NOTE — PROGRESS NOTES
Chief Complaint   Patient presents with    Annual Wellness Visit     This is a subsequent Annual Wellness Exam (AWV)    I have reviewed the patient's medical history in detail and updated the computerized patient record. History     Past Medical History:   Diagnosis Date    BPPV (benign paroxysmal positional vertigo) 5/26/2010    CABG x 4     CAD (coronary artery disease), native coronary artery     cath (jan 2014): 40% LM, pLAD 85%, dLAD 70%, dLCx 80%, mRCA 95%. s/p CABG x 4 (LIMA->D, SVG->dLAD, Seq SVG -> RPDA & OM2)    Cardiac catheterization 01/10/2014    pLM 40%. pLAD 85%. dCX 80%. mRCA 95%. CABG recommended.  Cardiac echocardiogram 09/03/2014    Tech difficult. EF 40-45%. Mild, diffuse hypk. Mild LVH. Indeterminate diastolic fx. Mild LAE. No significant valvular heart disease.  Carotid duplex 01/13/2014    Mod 50-69% VALENTIN stenosis. Mild 8-57% LICA stenosis. Occluded right vertebral.       Carpal tunnel syndrome     Cerebrovascular disease     Moderate VALENTIN, mild LICA, occluded R vertebral (Doppler Jan 2014)    Chest pain, musculoskeletal     post CABG    Diabetes ([address]     Dyslipidemia     H/O tobacco use, presenting hazards to health     quit 2014    Headache(784.0)     cluster    Heart attack ([address]     Hiatal hernia     Hypercholesterolemia     Hypertensive heart disease with congestive heart failure ([address]     Internal carotid artery stenosis 5/26/2010    Obesity (BMI 30-39. 9)     S/P colonoscopic polypectomy 09/2017    tubular adenoma, f/u 5 years    Sleep apnea     does not use cpap machine    Stroke ([address] 5/22/09    TIA (no residuals)    Systolic HF (heart failure) (HCC)     LV EF 35% (echo Jan 2014).  Mean LA pressure 32 (Jan 2014)    Vertigo       Past Surgical History:   Procedure Laterality Date    COLONOSCOPY N/A 9/7/2017    COLONOSCOPY with polypectomy performed by Earl Gutierrez MD at [facility]  1/14/14    JUNE to the D1, and GSV to the distal LAD, and anotherGSV sequentially to the PDA of the RCA and to theOM2 and ascending aortic endarterectomy, Dr. Juan Francisco Thomas, 1/14/14.  HX CORONARY STENT PLACEMENT      HX UROLOGICAL      repair of spermatocele     Current Outpatient Medications   Medication Sig Dispense Refill    glyBURIDE (DIABETA) 2.5 mg tablet TAKE 1 TABLET BY MOUTH ONCE DAILY 90 Tab 1    potassium chloride (KLOR-CON M10) 10 mEq tablet TAKE ONE TABLET BY MOUTH ONCE DAILY WITH  LASIX 90 Tab 3    carvedilol (COREG) 25 mg tablet Take 1 Tab by mouth two (2) times daily (with meals). 90 Tab 3    rosuvastatin (CRESTOR) 20 mg tablet Take 1 Tab by mouth daily. 30 Tab 6    clopidogrel (PLAVIX) 75 mg tab TAKE ONE TABLET BY MOUTH ONCE DAILY 90 Tab 3    lisinopril (PRINIVIL, ZESTRIL) 5 mg tablet TAKE ONE TABLET BY MOUTH ONCE DAILY 30 Tab 11    aspirin delayed-release 81 mg tablet Take 81 mg by mouth daily.  furosemide (LASIX) 40 mg tablet TAKE ONE TABLET BY MOUTH ONCE DAILY with potassium chloride 90 Tab 3    nitroglycerin (NITROSTAT) 0.4 mg SL tablet 1 Tab by SubLINGual route every five (5) minutes as needed for Chest Pain. 1 Bottle 0    cpap machine kit by Does Not Apply route. Overnight CPAP at 16 CWP with ramp and humidifier. Mask: Simplus FF small or mask of choice. Supplies 99 month. Please send compliance data [id number]. Diagnosis DAYANA.  AHI 52 RDI 52 1 Kit 0     Allergies   Allergen Reactions    Atorvastatin Myalgia     High dose    Peanut Diarrhea and Nausea and Vomiting    Acetaminophen Other (comments)     hallucinations    Dimethicone Rash    Percocet [Oxycodone-Acetaminophen] Other (comments)     hallucinations     Family History   Problem Relation Age of Onset    Cancer Father         prostate    Heart Disease Father     Heart Disease Brother         age 48   [facility] Other Son         Brain damage    Heart Attack Brother      Social History     Tobacco Use    Smoking status: Former Smoker Packs/day: 0.25     Years: 10.00     Pack years: 2.50     Types: Cigarettes     Last attempt to quit: 2014     Years since quittin.1    Smokeless tobacco: Never Used    Tobacco comment:  less then 5 ciggerates a week   Substance Use Topics    Alcohol use: Yes     Comment: beer occasionally     Patient Active Problem List   Diagnosis Code    S/P CABG x 4 Z95.1    Cerebrovascular disease I67.9    Dyslipidemia E78.5    CAD S/P percutaneous coronary angioplasty I25.10, Z98.61    [facility] systolic HF [facility]heart failure) ([facility]) I50.22    Hypertensive heart disease with congestive heart failure ([facility] I11.0    Chest pain, musculoskeletal R07.89    Obesity (BMI 30-39. 9) E66.9    H/O tobacco use, presenting hazards to health Z87.891    Carpal tunnel syndrome on both sides G56.03    Severe obstructive sleep apnea G47.33    Advance care planning Z71.89       Depression Risk Factor Screening:     3 most recent PHQ Screens 3/12/2019   Little interest or pleasure in doing things Not at all   Feeling down, depressed, irritable, or hopeless Not at all   Total Score PHQ 2 0     Alcohol Risk Factor Screening: You do not drink alcohol or very rarely. Functional Ability and Level of Safety:     Hearing Loss  Hearing is good. Activities of Daily Living  The home contains: no safety equipment. Patient does total self care    Fall Risk  Fall Risk Assessment, last 12 mths 10/16/2017   Able to walk? Yes   Fall in past 12 months?  No       Abuse Screen  Patient is not abused    Cognitive Screening   Evaluation of Cognitive Function:  Has your family/caregiver stated any concerns about your memory: no  Normal    Patient Care Team   Patient Care Team:  Flaquita Allison MD as PCP - General (Family Practice)  Joel Jack MD (Inactive) (Orthopedic Surgery)  Vicenta Shay MD (Neurology)  Beto De Leon MD (Pulmonary Disease)  Ramon Gonzales MD (Ophthalmology)  Robson Sevilla MD (Cardiology)    Assessment/Plan   Education and counseling provided:  Are appropriate based on today's review and evaluation    ICD-10-CM ICD-9-CM    1. Medicare annual wellness visit, subsequent Z00.00 V70.0    2. Advanced directives, counseling/discussion Z71.89 V65.49 ADVANCE CARE PLANNING FIRST 30 MINS   3. Screening for depression Z13.31 V79.0 DEPRESSION SCREEN ANNUAL     Age and sex specific counseling. Screening for depression and alcoholism. Advanced directives / end of life planning discussion - see ACP note. Care team updated. Declines vaccines. Medicare recommended screening and prevention test guidelines are filled out, reviewed, and provided to patient. Screening and prevention is up to date after PSA testing. Patient understands our medical plan. Patient has provided input and agrees with goals. All questions answered.

## 2019-04-22 RX ORDER — CLOPIDOGREL BISULFATE 75 MG/1
TABLET ORAL
Qty: 90 TAB | Refills: 3 | Status: SHIPPED | OUTPATIENT
Start: 2019-04-22 | End: 2020-08-06

## 2019-07-16 ENCOUNTER — OFFICE VISIT (OUTPATIENT)
Dept: CARDIOLOGY CLINIC | Age: 64
End: 2019-07-16

## 2019-07-16 VITALS
HEIGHT: 63 IN | OXYGEN SATURATION: 95 % | WEIGHT: 181 LBS | DIASTOLIC BLOOD PRESSURE: 70 MMHG | HEART RATE: 72 BPM | SYSTOLIC BLOOD PRESSURE: 122 MMHG | BODY MASS INDEX: 32.07 KG/M2

## 2019-07-16 DIAGNOSIS — Z98.61 CAD S/P PERCUTANEOUS CORONARY ANGIOPLASTY: Primary | ICD-10-CM

## 2019-07-16 DIAGNOSIS — R07.9 CHEST PAIN, UNSPECIFIED TYPE: ICD-10-CM

## 2019-07-16 DIAGNOSIS — E78.5 DYSLIPIDEMIA: ICD-10-CM

## 2019-07-16 DIAGNOSIS — I25.10 CAD S/P PERCUTANEOUS CORONARY ANGIOPLASTY: Primary | ICD-10-CM

## 2019-07-16 NOTE — PROGRESS NOTES
HISTORY OF PRESENT ILLNESS  Rachel Wilkinson is a 61 y.o. male. HPI  He is complaining of some chest pain in the left lateral precordial area. This occurs when he tries to move his arm or torso. It lasts only for a few seconds. He denies exertional chest tightness, pressure or shortness of breath. He has been very active. He does walk 3-4 miles a day with his dog and also he works hard in his yard without difficulty. He has had no dyspnea, orthopnea, PND. He denies palpitations, dizziness or syncope. He has had no symptoms to indicate TIA or amaurosis fugax. He has history of known coronary artery disease.  He underwent CABG X four in January of 2014, which consisted of:    1. LIMA to the proximal LAD               2. Single SVG to the distal LAD               3. Sequential SVG to the RPDA and OM2.       He was readmitted with a non-STEMI myocardial infarction in January of 2015 and underwent cardiac catheterization, which demonstrated:    1. Patent LIMA to the LAD                2. Total occlusion of the SVG to the LAD                3. 95% ostial stenosis of the sequential SVG to the RPDA and obtuse marginal branch 2 with 70% OM just after the anastomosis.       He subsequently underwent successful stenting of the vein graft to the RPDA and OM along with OM branch with a Xience drug-eluting stent.       He has history of hypertension, diabetes mellitus, and dyslipidemia. Candice Hays does have history of metabolic syndrome with high triglycerides and low HDL along with diabetes and hypertension.       His echocardiogram in March of 2014 demonstrated mild to moderate LV dysfunction with EF in the 40-45% range. Cristofer Star was no significant valvular dysfunction. Rains Star was no evidence of significant pulmonary hypertension.       Review of Systems   Constitutional: Negative for malaise/fatigue and weight loss. HENT: Negative for hearing loss. Eyes: Negative for blurred vision and double vision.    Respiratory: Positive for shortness of breath. Cardiovascular: Positive for chest pain. Negative for palpitations, orthopnea, claudication, leg swelling and PND. Gastrointestinal: Negative for blood in stool, heartburn and melena. Genitourinary: Negative for dysuria, frequency, hematuria and urgency. Musculoskeletal: Negative for back pain and joint pain. Skin: Negative for itching and rash. Neurological: Positive for dizziness. Negative for loss of consciousness. Psychiatric/Behavioral: Negative for depression and memory loss. Physical Exam   Constitutional: He is oriented to person, place, and time. He appears well-developed and well-nourished. HENT:   Head: Normocephalic and atraumatic. Eyes: Pupils are equal, round, and reactive to light. Conjunctivae are normal.   Neck: Normal range of motion. Neck supple. No JVD present. Cardiovascular: Normal rate, regular rhythm, S1 normal and S2 normal.  No extrasystoles are present. PMI is not displaced. Exam reveals no gallop and no friction rub. No murmur heard. Pulses:       Carotid pulses are 3+ on the right side, and 3+ on the left side. Pulmonary/Chest: Effort normal. He has no rales. Abdominal: Soft. There is no tenderness. Musculoskeletal: He exhibits no edema. Neurological: He is alert and oriented to person, place, and time. No cranial nerve deficit. Skin: Skin is warm and dry. Psychiatric: He has a normal mood and affect. His behavior is normal.     Visit Vitals  /70   Pulse 72   Ht 5' 3\" (1.6 m)   Wt 82.1 kg (181 lb)   SpO2 95%   BMI 32.06 kg/m²       Past Medical History:   Diagnosis Date    BPPV (benign paroxysmal positional vertigo) 5/26/2010    CABG x 4     CAD (coronary artery disease), native coronary artery     cath (jan 2014): 40% LM, pLAD 85%, dLAD 70%, dLCx 80%, mRCA 95%. s/p CABG x 4 (LIMA->D, SVG->dLAD, Seq SVG -> RPDA & OM2)    Cardiac catheterization 01/10/2014    pLM 40%. pLAD 85%. dCX 80%. mRCA 95%.   CABG recommended.  Cardiac echocardiogram 2014    Tech difficult. EF 40-45%. Mild, diffuse hypk. Mild LVH. Indeterminate diastolic fx. Mild LAE. No significant valvular heart disease.  Carotid duplex 2014    Mod 50-69% VALENTIN stenosis. Mild 8-08% LICA stenosis. Occluded right vertebral.       Carpal tunnel syndrome     Cerebrovascular disease     Moderate VALENTIN, mild LICA, occluded R vertebral (Doppler 2014)    Chest pain, musculoskeletal     post CABG    Diabetes ([address]     Dyslipidemia     H/O tobacco use, presenting hazards to health     quit     Headache(784.0)     cluster    Heart attack ([address]     Hiatal hernia     Hypercholesterolemia     Hypertensive heart disease with [facility] failure [facility]     Internal carotid artery stenosis 2010    Obesity (BMI 30-39. 9)     S/P colonoscopic polypectomy 2017    tubular adenoma, f/u 5 years    Sleep apnea     does not use cpap machine    Stroke ([facility] 09    TIA (no residuals)    Systolic HF (heart failure) (HCC)     LV EF 35% (echo 2014).  Mean LA pressure 32 (2014)    Vertigo        Social History     Socioeconomic History    Marital status:      Spouse name: Not on file    Number of children: Not on file    Years of education: Not on file    Highest education level: Not on file   Occupational History    Occupation: retired   Social Needs    Financial resource strain: Not on file    Food insecurity:     Worry: Not on file     Inability: Not on file   [employer] needs:     Medical: Not on file     Non-medical: Not on file   Tobacco Use    Smoking status: Former Smoker     Packs/day: 0.25     Years: 10.00     Pack years: 2.50     Types: Cigarettes     Last attempt to quit: 2014     Years since quittin.5    Smokeless tobacco: Never Used    Tobacco comment:  less then 5 ciggerates a week   Substance and Sexual Activity    Alcohol use: Yes     Frequency: Monthly or less Drinks per session: 1 or 2     Binge frequency: Never     Comment: beer occasionally    Drug use: No    Sexual activity: Not Currently     Partners: Female   Lifestyle    Physical activity:     Days per week: Not on file     Minutes per session: Not on file    Stress: Not on file   Relationships    Social connections:     Talks on phone: Not on file     Gets together: Not on file     Attends Sikh service: Not on file     Active member of club or organization: Not on file     Attends meetings of clubs or organizations: Not on file     Relationship status: Not on file    Intimate partner violence:     Fear of current or ex partner: Not on file     Emotionally abused: Not on file     Physically abused: Not on file     Forced sexual activity: Not on file   Other Topics Concern    Not on file   Social History Narrative    Not on file       Family History   Problem Relation Age of Onset    Cancer Father         prostate    Heart Disease Father     Heart Disease Brother         age 48   [address] Other Son         Brain damage    Heart Attack Brother        Past Surgical History:   Procedure Laterality Date    COLONOSCOPY N/A 9/7/2017    COLONOSCOPY with polypectomy performed by Jerad De La Cruz MD at [facility]  1/14/14    LIMA to the D1, and GSV to the distal LAD, and anotherGSV sequentially to the PDA of the RCA and to theOM2 and ascending aortic endarterectomy, Dr. Neetu Montero, 1/14/14.     HX CORONARY STENT PLACEMENT      HX UROLOGICAL      repair of spermatocele       Current Outpatient Medications   Medication Sig Dispense Refill    clopidogrel (PLAVIX) 75 mg tab TAKE 1 TABLET BY MOUTH ONCE DAILY 90 Tab 3    glyBURIDE (DIABETA) 2.5 mg tablet TAKE 1 TABLET BY MOUTH ONCE DAILY 90 Tab 1    potassium chloride (KLOR-CON M10) 10 mEq tablet TAKE ONE TABLET BY MOUTH ONCE DAILY WITH  LASIX 90 Tab 3    carvedilol (COREG) 25 mg tablet Take 1 Tab by mouth two (2) times daily (with meals). 90 Tab 3    rosuvastatin (CRESTOR) 20 mg tablet Take 1 Tab by mouth daily. 30 Tab 6    lisinopril (PRINIVIL, ZESTRIL) 5 mg tablet TAKE ONE TABLET BY MOUTH ONCE DAILY 30 Tab 11    aspirin delayed-release 81 mg tablet Take 81 mg by mouth daily.  furosemide (LASIX) 40 mg tablet TAKE ONE TABLET BY MOUTH ONCE DAILY with potassium chloride 90 Tab 3    nitroglycerin (NITROSTAT) 0.4 mg SL tablet 1 Tab by SubLINGual route every five (5) minutes as needed for Chest Pain. 1 Bottle 0    cpap machine kit by Does Not Apply route. Overnight CPAP at 16 CWP with ramp and humidifier. Mask: Simplus FF small or mask of choice. Supplies 99 month. Please send compliance data [id number]. Diagnosis DAYANA. AHI 52 RDI 52 1 Kit 0       EKG: unchanged from previous tracings, normal sinus rhythm, nonspecific ST and T waves changes. ASSESSMENT and PLAN  Encounter Diagnoses   Name Primary?  CAD,CABGx4 Jan.2014,Stent SVG to RPDA,Obtuse marginal2 Yes    Dyslipidemia     Chest pain, unspecified type    He has been doing very well and has remained active. His chest pain sounds clearly noncardiac in nature and the patient was reassured. He has had no symptoms to indicate angina or cardiac decompensation. His blood pressure has been under control. His activity level is very satisfactory. He will have follow up stress nuclear cardiac imaging in six months.

## 2019-07-16 NOTE — PROGRESS NOTES
Vashti Craig presents today for   Chief Complaint   Patient presents with    Coronary Artery Disease     6 month        Vashti Craig preferred language for health care discussion is english/other. Is someone accompanying this pt? no    Is the patient using any DME equipment during [address]? no    Depression Screening:  3 most recent PHQ Screens 3/12/2019   Little interest or pleasure in doing things Not at all   Feeling down, depressed, irritable, or hopeless Not at all   Total Score PHQ 2 0       Learning Assessment:  Learning Assessment 8/12/2014   PRIMARY LEARNER Patient   HIGHEST LEVEL OF EDUCATION - PRIMARY LEARNER  -   BARRIERS PRIMARY LEARNER -   [facility]    NEED -   LEARNER PREFERENCE PRIMARY DEMONSTRATION   ANSWERED BY patient   RELATIONSHIP SELF       Abuse Screening:  Abuse Screening Questionnaire 3/12/2019   Do you ever feel afraid of your partner? N   Are you in a relationship with someone who physically or mentally threatens you? N   Is it safe for you to go home? Y       Fall Risk  Fall Risk Assessment, last 12 mths 10/16/2017   Able to walk? Yes   Fall in past 12 months? No       Pt currently taking Anticoagulant therapy? yes    Coordination of Care:  1. Have you been to the ER, urgent care clinic since your last visit? Hospitalized since your last visit? no    2. Have you seen or consulted any other health care providers outside of the [address] [place] [address] since your last visit? Include any pap smears or colon screening.  no

## 2019-08-13 ENCOUNTER — TELEPHONE (OUTPATIENT)
Dept: FAMILY MEDICINE CLINIC | Age: 64
End: 2019-08-13

## 2019-08-13 NOTE — TELEPHONE ENCOUNTER
Pt called and stated his cardiologist didn't receive the mediation list that was left here for the dentist. Please call pt at your earliest convenience. Ladonna Hendrix

## 2019-08-14 ENCOUNTER — DOCUMENTATION ONLY (OUTPATIENT)
Dept: CARDIOLOGY CLINIC | Age: 64
End: 2019-08-14

## 2019-08-14 NOTE — PROGRESS NOTES
Clearance to hold Plavix 7 days prior to dental extraction faxed to [facility] dentures at fax # [phone]

## 2019-08-29 RX ORDER — FUROSEMIDE 40 MG/1
TABLET ORAL
Qty: 90 TAB | Refills: 3 | Status: SHIPPED | OUTPATIENT
Start: 2019-08-29 | End: 2020-11-12

## 2019-09-09 ENCOUNTER — TELEPHONE (OUTPATIENT)
Dept: FAMILY MEDICINE CLINIC | Age: 64
End: 2019-09-09

## 2019-09-09 NOTE — TELEPHONE ENCOUNTER
Pt is requesting his labs to be mailed to him. He states that hs didn't know he had a scheduled appt for tomorrow. His labs will  by the time he comes in. Please advise.

## 2019-09-16 ENCOUNTER — TELEPHONE (OUTPATIENT)
Dept: FAMILY MEDICINE CLINIC | Age: 64
End: 2019-09-16

## 2019-09-16 RX ORDER — LISINOPRIL 5 MG/1
TABLET ORAL
Qty: 30 TAB | Refills: 11 | Status: SHIPPED | OUTPATIENT
Start: 2019-09-16 | End: 2020-01-14 | Stop reason: SDUPTHER

## 2019-09-16 RX ORDER — GLYBURIDE 2.5 MG/1
TABLET ORAL
Qty: 90 TAB | Refills: 1 | Status: CANCELLED | OUTPATIENT
Start: 2019-09-16

## 2019-09-16 RX ORDER — CARVEDILOL 25 MG/1
TABLET ORAL
Qty: 180 TAB | Refills: 3 | Status: SHIPPED | OUTPATIENT
Start: 2019-09-16 | End: 2020-04-15

## 2019-09-16 RX ORDER — LISINOPRIL 5 MG/1
TABLET ORAL
Qty: 90 TAB | Refills: 3 | Status: SHIPPED | OUTPATIENT
Start: 2019-09-16 | End: 2020-12-14

## 2019-09-16 RX ORDER — GLYBURIDE 2.5 MG/1
TABLET ORAL
Qty: 30 TAB | Refills: 0 | Status: SHIPPED | OUTPATIENT
Start: 2019-09-16 | End: 2019-10-31 | Stop reason: SDUPTHER

## 2019-09-16 RX ORDER — CARVEDILOL 25 MG/1
25 TABLET ORAL 2 TIMES DAILY WITH MEALS
Qty: 90 TAB | Refills: 3 | Status: SHIPPED | OUTPATIENT
Start: 2019-09-16 | End: 2020-01-14 | Stop reason: SDUPTHER

## 2019-09-27 ENCOUNTER — DOCUMENTATION ONLY (OUTPATIENT)
Dept: FAMILY MEDICINE CLINIC | Age: 64
End: 2019-09-27

## 2019-09-27 DIAGNOSIS — Z12.5 PROSTATE CANCER SCREENING: ICD-10-CM

## 2019-09-27 DIAGNOSIS — I50.22 CHRONIC SYSTOLIC HF (HEART FAILURE) (HCC): ICD-10-CM

## 2019-09-27 DIAGNOSIS — E11.9 CONTROLLED TYPE 2 DIABETES MELLITUS WITHOUT COMPLICATION, WITHOUT LONG-TERM CURRENT USE OF INSULIN (HCC): Primary | ICD-10-CM

## 2019-09-27 DIAGNOSIS — E78.5 DYSLIPIDEMIA: ICD-10-CM

## 2019-09-27 NOTE — TELEPHONE ENCOUNTER
30 day supply is covered and ready for  at pharmacy. Patient aware. 90 day not being requested by patient.

## 2019-09-27 NOTE — PROGRESS NOTES
Patient lab order faxed to [facility] in [facility] [phone] per patient's request.

## 2019-10-13 DIAGNOSIS — E78.5 DYSLIPIDEMIA: ICD-10-CM

## 2019-10-14 RX ORDER — ROSUVASTATIN CALCIUM 20 MG/1
TABLET, COATED ORAL
Qty: 90 TAB | Refills: 3 | Status: SHIPPED | OUTPATIENT
Start: 2019-10-14 | End: 2021-05-25 | Stop reason: SDUPTHER

## 2019-10-30 RX ORDER — GLYBURIDE 2.5 MG/1
TABLET ORAL
Qty: 30 TAB | Refills: 0 | OUTPATIENT
Start: 2019-10-30

## 2019-10-30 NOTE — TELEPHONE ENCOUNTER
Mr. Shlomo Hernandes called back to scheduled with front office for 11- at 11:15 am and aware to have fasting labs done (will  lab order to take to [facility])

## 2019-10-31 RX ORDER — GLYBURIDE 2.5 MG/1
TABLET ORAL
Qty: 30 TAB | Refills: 0 | Status: SHIPPED | OUTPATIENT
Start: 2019-10-31 | End: 2019-11-25 | Stop reason: SDUPTHER

## 2019-11-06 LAB
ALBUMIN SERPL-MCNC: 4.2 G/DL (ref 3.6–4.8)
ALBUMIN/CREAT UR: 7.3 MG/G CREAT (ref 0–30)
ALBUMIN/GLOB SERPL: 1.8 {RATIO} (ref 1.2–2.2)
ALP SERPL-CCNC: 37 IU/L (ref 39–117)
ALT SERPL-CCNC: 8 IU/L (ref 0–44)
AST SERPL-CCNC: 11 IU/L (ref 0–40)
BILIRUB SERPL-MCNC: 0.4 MG/DL (ref 0–1.2)
BUN SERPL-MCNC: 16 MG/DL (ref 8–27)
BUN/CREAT SERPL: 12 (ref 10–24)
CALCIUM SERPL-MCNC: 9 MG/DL (ref 8.6–10.2)
CHLORIDE SERPL-SCNC: 104 MMOL/L (ref 96–106)
CHOLEST SERPL-MCNC: 151 MG/DL (ref 100–199)
CO2 SERPL-SCNC: 24 MMOL/L (ref 20–29)
CREAT SERPL-MCNC: 1.33 MG/DL (ref 0.76–1.27)
CREAT UR-MCNC: 179.5 MG/DL
ERYTHROCYTE [DISTWIDTH] IN BLOOD BY AUTOMATED COUNT: 13.2 % (ref 12.3–15.4)
GLOBULIN SER CALC-MCNC: 2.4 G/DL (ref 1.5–4.5)
GLUCOSE SERPL-MCNC: 98 MG/DL (ref 65–99)
HBA1C MFR BLD: 6.1 % (ref 4.8–5.6)
HCT VFR BLD AUTO: 43.4 % (ref 37.5–51)
HDLC SERPL-MCNC: 33 MG/DL
HGB BLD-MCNC: 14 G/DL (ref 13–17.7)
INTERPRETATION, 910389: NORMAL
INTERPRETATION: NORMAL
LDLC SERPL CALC-MCNC: 63 MG/DL (ref 0–99)
Lab: NORMAL
MCH RBC QN AUTO: 28.6 PG (ref 26.6–33)
MCHC RBC AUTO-ENTMCNC: 32.3 G/DL (ref 31.5–35.7)
MCV RBC AUTO: 89 FL (ref 79–97)
MICROALBUMIN UR-MCNC: 13.1 UG/ML
PDF IMAGE, 910387: NORMAL
PLATELET # BLD AUTO: 203 X10E3/UL (ref 150–450)
POTASSIUM SERPL-SCNC: 4.7 MMOL/L (ref 3.5–5.2)
PROT SERPL-MCNC: 6.6 G/DL (ref 6–8.5)
PSA SERPL-MCNC: 1.4 NG/ML (ref 0–4)
RBC # BLD AUTO: 4.9 X10E6/UL (ref 4.14–5.8)
SODIUM SERPL-SCNC: 142 MMOL/L (ref 134–144)
TRIGL SERPL-MCNC: 273 MG/DL (ref 0–149)
VLDLC SERPL CALC-MCNC: 55 MG/DL (ref 5–40)
WBC # BLD AUTO: 7.8 X10E3/UL (ref 3.4–10.8)

## 2019-11-12 ENCOUNTER — OFFICE VISIT (OUTPATIENT)
Dept: FAMILY MEDICINE CLINIC | Age: 64
End: 2019-11-12

## 2019-11-12 VITALS
SYSTOLIC BLOOD PRESSURE: 114 MMHG | WEIGHT: 172 LBS | HEART RATE: 72 BPM | OXYGEN SATURATION: 95 % | HEIGHT: 63 IN | TEMPERATURE: 98.3 F | DIASTOLIC BLOOD PRESSURE: 62 MMHG | RESPIRATION RATE: 16 BRPM | BODY MASS INDEX: 30.48 KG/M2

## 2019-11-12 DIAGNOSIS — G47.33 SEVERE OBSTRUCTIVE SLEEP APNEA: ICD-10-CM

## 2019-11-12 DIAGNOSIS — I50.22 CHRONIC SYSTOLIC HF (HEART FAILURE) (HCC): ICD-10-CM

## 2019-11-12 DIAGNOSIS — E78.5 DYSLIPIDEMIA: ICD-10-CM

## 2019-11-12 DIAGNOSIS — Z95.1 S/P CABG X 4: ICD-10-CM

## 2019-11-12 DIAGNOSIS — I11.0 HYPERTENSIVE HEART DISEASE WITH CONGESTIVE HEART FAILURE, UNSPECIFIED HEART FAILURE TYPE (HCC): ICD-10-CM

## 2019-11-12 DIAGNOSIS — E66.9 OBESITY (BMI 30-39.9): ICD-10-CM

## 2019-11-12 DIAGNOSIS — E11.9 CONTROLLED TYPE 2 DIABETES MELLITUS WITHOUT COMPLICATION, WITHOUT LONG-TERM CURRENT USE OF INSULIN (HCC): Primary | ICD-10-CM

## 2019-11-12 NOTE — PROGRESS NOTES
SUBJECTIVE  Chief Complaint   Patient presents with    Diabetes    CHF    Coronary Artery Disease    Cholesterol Problem    Results     review      Here for routine follow-up. No complaints and by report is doing great! He is overdue for follow-up. Lost weight as a result of getting dentures. Likes the new way he is eating. Feeling good energy. He is taking glyburide. Had a diabetic eye exam in 2017 and does not want to go back due to the co-pay of $40. Denies any visual changes. Does not report any polyuria or polydipsia. He is currently doing well from a cardiac standpoint without any cardiac chest pain or dyspnea. He has been compliant with Plavix. He is tolerating his statin without myalgias. He has sleep apnea but cannot wear CPAP because he says it suffocates him. OBJECTIVE    Blood pressure 114/62, pulse 72, temperature 98.3 °F (36.8 °C), temperature source Oral, resp. rate 16, height 5' 3\" (1.6 m), weight 172 lb (78 kg), SpO2 95 %. General:  alert, cooperative, well appearing, in no apparent distress. Heart: Normal S1S2, RRR. Chest: Clear, no w/r/r. Skin:  No pedal lesions. Neuro:  Monofilament testing is intact. No motor or sensory deficits. Foot exam  was performed. No deformities. Sensory and motor testing was assessed - intact. Pedal pulse(s) was assessed - intact. No pedal lesions. Psych: normal affect. Mood good. Oriented x 3. Judgement and insight intact.      Results for orders placed or performed in visit on 07/38/08   METABOLIC PANEL, COMPREHENSIVE   Result Value Ref Range    Glucose 98 65 - 99 mg/dL    BUN 16 8 - 27 mg/dL    Creatinine 1.33 (H) 0.76 - 1.27 mg/dL    GFR est non-AA 56 (L) >59 mL/min/1.73    GFR est AA 65 >59 mL/min/1.73    BUN/Creatinine ratio 12 10 - 24    Sodium 142 134 - 144 mmol/L    Potassium 4.7 3.5 - 5.2 mmol/L    Chloride 104 96 - 106 mmol/L    CO2 24 20 - 29 mmol/L    Calcium 9.0 8.6 - 10.2 mg/dL    Protein, total 6.6 6.0 - 8.5 g/dL    Albumin 4.2 3.6 - 4.8 g/dL    GLOBULIN, TOTAL 2.4 1.5 - 4.5 g/dL    A-G Ratio 1.8 1.2 - 2.2    Bilirubin, total 0.4 0.0 - 1.2 mg/dL    Alk. phosphatase 37 (L) 39 - 117 IU/L    AST (SGOT) 11 0 - 40 IU/L    ALT (SGPT) 8 0 - 44 IU/L   CBC W/O DIFF   Result Value Ref Range    WBC 7.8 3.4 - 10.8 x10E3/uL    RBC 4.90 4.14 - 5.80 x10E6/uL    HGB 14.0 13.0 - 17.7 g/dL    HCT 43.4 37.5 - 51.0 %    MCV 89 79 - 97 fL    MCH 28.6 26.6 - 33.0 pg    MCHC 32.3 31.5 - 35.7 g/dL    RDW 13.2 12.3 - 15.4 %    PLATELET 918 661 - 935 x10E3/uL   LIPID PANEL   Result Value Ref Range    Cholesterol, total 151 100 - 199 mg/dL    Triglyceride 273 (H) 0 - 149 mg/dL    HDL Cholesterol 33 (L) >39 mg/dL    VLDL, calculated 55 (H) 5 - 40 mg/dL    LDL, calculated 63 0 - 99 mg/dL   MICROALBUMIN, UR, RAND   Result Value Ref Range    Creatinine, urine 179.5 Not Estab. mg/dL    Microalbumin, urine 13.1 Not Estab. ug/mL    Microalb/Creat ratio (ug/mg creat.) 7.3 0.0 - 30.0 mg/g creat   CVD REPORT   Result Value Ref Range    INTERPRETATION Note     PDF IMAGE Not applicable    CKD REPORT   Result Value Ref Range    Interpretation Note    DIABETES PATIENT EDUCATION   Result Value Ref Range    PDF Image Not applicable    HEMOGLOBIN A1C W/O EAG   Result Value Ref Range    Hemoglobin A1c 6.1 (H) 4.8 - 5.6 %   PSA SCREENING (SCREENING)   Result Value Ref Range    Prostate Specific Ag 1.4 0.0 - 4.0 ng/mL     ASSESSMENT / PLAN    ICD-10-CM ICD-9-CM    1. Controlled type 2 diabetes mellitus without complication, without long-term current use of insulin (HCC) E11.9 250.00    2. Chronic systolic HF (heart failure) (HCC) I50.22 428.22    3. Hypertensive heart disease with congestive heart failure, unspecified heart failure type (HCC) I11.0 402.91      428.0    4. Dyslipidemia E78.5 272.4    5. S/P CABG x 4 Z95.1 V45.81    6. Severe obstructive sleep apnea G47.33 327.23    7. Obesity (BMI 30-39. 9) E66.9 278.00      Labs reviewed.     Diabetes -  A1c shows adequate control. I have counseled him on diet lower in carbs. Cont glyburide. Advised regular foot checks and annual eye exams. He is declining an eye referral due to cost.    CAD / chronic CHF (compensated) / hypertensive heart disease / dyslipidemia - under care of cardiology. Continue current care. Refills of meds as needed. Sleep apnea - he is not tolerating CPAP. He can see his sleep specialist to investigate other options. Obesity - Diabetic dieting. All chart history elements were reviewed by me at the time of the visit even though marked at time of note closure. Patient understands our medical plan. Patient has provided input and agrees with goals. Alternatives have been explained and offered. All questions answered. The patient is to call if condition worsens or fails to improve. Follow-up and Dispositions    · Return in about 6 months (around 5/12/2020).

## 2019-11-12 NOTE — PATIENT INSTRUCTIONS
A Healthy Lifestyle: Care Instructions Your Care Instructions A healthy lifestyle can help you feel good, stay at a healthy weight, and have plenty of energy for both work and play. A healthy lifestyle is something you can share with your whole family. A healthy lifestyle also can lower your risk for serious health problems, such as high blood pressure, heart disease, and diabetes. You can follow a few steps listed below to improve your health and the health of your family. Follow-up care is a key part of your treatment and safety. Be sure to make and go to all appointments, and call your doctor if you are having problems. It's also a good idea to know your test results and keep a list of the medicines you take. How can you care for yourself at home? · Do not eat too much sugar, fat, or fast foods. You can still have dessert and treats now and then. The goal is moderation. · Start small to improve your eating habits. Pay attention to portion sizes, drink less juice and soda pop, and eat more fruits and vegetables. ? Eat a healthy amount of food. A 3-ounce serving of meat, for example, is about the size of a deck of cards. Fill the rest of your plate with vegetables and whole grains. ? Limit the amount of soda and sports drinks you have every day. Drink more water when you are thirsty. ? Eat at least 5 servings of fruits and vegetables every day. It may seem like a lot, but it is not hard to reach this goal. A serving or helping is 1 piece of fruit, 1 cup of vegetables, or 2 cups of leafy, raw vegetables. Have an apple or some carrot sticks as an afternoon snack instead of a candy bar. Try to have fruits and/or vegetables at every meal. 
· Make exercise part of your daily routine. You may want to start with simple activities, such as walking, bicycling, or slow swimming. Try to be active 30 to 60 minutes every day.  You do not need to do all 30 to 60 minutes all at once. For example, you can exercise 3 times a day for 10 or 20 minutes. Moderate exercise is safe for most people, but it is always a good idea to talk to your doctor before starting an exercise program. 
· Keep moving. [employer] the lawn, work in the garden, or [employer]. Take the stairs instead of the elevator at work. · If you smoke, quit. People who smoke have an increased risk for heart attack, stroke, cancer, and other lung illnesses. Quitting is hard, but there are ways to boost your chance of quitting tobacco for good. ? Use nicotine gum, patches, or lozenges. ? Ask your doctor about stop-smoking programs and medicines. ? Keep trying. In addition to reducing your risk of diseases in the future, you will notice some benefits soon after you stop using tobacco. If you have shortness of breath or asthma symptoms, they will likely get better within a few weeks after you quit. · Limit how much alcohol you drink. Moderate amounts of alcohol (up to 2 drinks a day for men, 1 drink a day for women) are okay. But drinking too much can lead to liver problems, high blood pressure, and other health problems. Family health If you have a family, there are many things you can do together to improve your health. · Eat meals together as a family as often as possible. · Eat healthy foods. This includes fruits, vegetables, lean meats and dairy, and whole grains. · Include your family in your fitness plan. Most people think of activities such as jogging or tennis as the way to fitness, but there are many ways you and your family can be more active. Anything that makes you breathe hard and gets your heart pumping is exercise. Here are some tips: 
? Walk to do errands or to take your child to school or the bus. 
? Go for a family bike ride after dinner instead of watching TV. Where can you learn more? Go to http://miah-lucy.info/. Enter M361 in the search box to learn more about \"A Healthy Lifestyle: Care Instructions. \" Current as of: May 28, 2019 Content Version: 12.2 © 2795-9113 [employer]. Care instructions adapted under license by [employer] (which disclaims liability or warranty for this information). If you have questions about a medical condition or this instruction, always ask your healthcare professional. [place] [address] any warranty or liability for your use of this information.

## 2019-11-12 NOTE — PROGRESS NOTES
1. Have you been to the ER, urgent care clinic since your last visit? Hospitalized since your last visit? No    2. Have you seen or consulted any other health care providers outside of the [address] [place] [address] since your last visit? Include any pap smears or colon screening. No    Patient declines flu vaccine.

## 2019-11-25 NOTE — TELEPHONE ENCOUNTER
This pharmacy faxed over request for the following prescriptions to be filled: REQUESTING A 90 DAY SUPPLY Medication requested :  
Requested Prescriptions Pending Prescriptions Disp Refills  glyBURIDE (DIABETA) 2.5 mg tablet 30 Tab 0  
 
PCP: [facility] or Print: [facility] Mail order or Local pharmacy [address] Scheduled appointment if not seen by current providers in office: LOV 11/12/2019 No f/u up Scheduled at this time. DUE 5/12/2020

## 2019-11-26 RX ORDER — GLYBURIDE 2.5 MG/1
2.5 TABLET ORAL
Qty: 30 TAB | Refills: 5 | Status: SHIPPED | OUTPATIENT
Start: 2019-11-26 | End: 2020-07-21

## 2020-01-14 ENCOUNTER — OFFICE VISIT (OUTPATIENT)
Dept: CARDIOLOGY CLINIC | Age: 65
End: 2020-01-14

## 2020-01-14 VITALS
WEIGHT: 184 LBS | SYSTOLIC BLOOD PRESSURE: 124 MMHG | DIASTOLIC BLOOD PRESSURE: 68 MMHG | BODY MASS INDEX: 32.6 KG/M2 | OXYGEN SATURATION: 96 % | HEART RATE: 81 BPM | HEIGHT: 63 IN

## 2020-01-14 DIAGNOSIS — Z98.61 CAD S/P PERCUTANEOUS CORONARY ANGIOPLASTY: ICD-10-CM

## 2020-01-14 DIAGNOSIS — R07.9 CHEST PAIN, UNSPECIFIED TYPE: Primary | ICD-10-CM

## 2020-01-14 DIAGNOSIS — I25.10 CAD S/P PERCUTANEOUS CORONARY ANGIOPLASTY: ICD-10-CM

## 2020-01-14 DIAGNOSIS — E78.5 DYSLIPIDEMIA: ICD-10-CM

## 2020-01-14 NOTE — PATIENT INSTRUCTIONS
If you have not heard from the central scheduler to schedule your testing in 48 hours, please call [phone].

## 2020-01-14 NOTE — PROGRESS NOTES
Sharan Maier presents today for   Chief Complaint   Patient presents with    Coronary Artery Disease     6 MONTH F/U     Chest Pain (Angina)     on bending        Sharan Maier preferred language for health care discussion is english/other. Is someone accompanying this pt? no    Is the patient using any DME equipment during [address]? no    Depression Screening:  3 most recent PHQ Screens 3/12/2019   Little interest or pleasure in doing things Not at all   Feeling down, depressed, irritable, or hopeless Not at all   Total Score PHQ 2 0       Learning Assessment:  Learning Assessment 8/12/2014   PRIMARY LEARNER Patient   HIGHEST LEVEL OF EDUCATION - PRIMARY LEARNER  -   BARRIERS PRIMARY LEARNER -   [address] [facility]    NEED -   LEARNER PREFERENCE PRIMARY DEMONSTRATION   ANSWERED BY patient   RELATIONSHIP SELF       Abuse Screening:  Abuse Screening Questionnaire 3/12/2019   Do you ever feel afraid of your partner? N   Are you in a relationship with someone who physically or mentally threatens you? N   Is it safe for you to go home? Y       Fall Risk  Fall Risk Assessment, last 12 mths 10/16/2017   Able to walk? Yes   Fall in past 12 months? No       Pt currently taking Anticoagulant therapy? no    Coordination of Care:  1. Have you been to the ER, urgent care clinic since your last visit? Hospitalized since your last visit? no    2. Have you seen or consulted any other health care providers outside of the [address] [place] [address] since your last visit? Include any pap smears or colon screening.  no

## 2020-01-14 NOTE — PROGRESS NOTES
HISTORY OF PRESENT ILLNESS  Tatyana Avendaño is a 59 y.o. male. HPI  He has been feeling quite well. He often walks three to five miles a day although he has slowed down lately since his dog . His brother recently passed away with a heart attack and this has been alarming him. He has found himself having some tightness in the left lower chest while swimming. The description of the discomfort is quite vague but it seems to be occurring with exertion although it does not occur when he walks. He denies dyspnea, orthopnea, PND. He denies palpitations, dizziness or syncope. He has had no symptoms to indicate TIA or amaurosis fugax. His blood pressure has been under control. His cholesterol profile has been largely satisfactory but HDL remains to be low at 33, but LDL is down to 55. He has history of known coronary artery disease.  He underwent CABG X four in 2014, which consisted of:    1. LIMA to the proximal LAD               2. Single SVG to the distal LAD               3. Sequential SVG to the RPDA and OM2.       He was readmitted with a non-STEMI myocardial infarction in 2015 and underwent cardiac catheterization, which demonstrated:    1. Patent LIMA to the LAD                2. Total occlusion of the SVG to the LAD                3. 95% ostial stenosis of the sequential SVG to the RPDA and obtuse marginal branch 2 with 70% OM just after the anastomosis.        He subsequently underwent successful stenting of the vein graft to the RPDA and OM along with OM branch with a Xience drug-eluting stent.       He has history of hypertension, diabetes mellitus, and dyslipidemia. Sherry Finley does have history of metabolic syndrome with high triglycerides and low HDL along with diabetes and hypertension.       His echocardiogram in 2014 demonstrated mild to moderate LV dysfunction with EF in the 40-45% range.  There was no significant valvular dysfunction. Valarie Felisa was no evidence of significant pulmonary hypertension.        Review of Systems   Constitutional: Negative for malaise/fatigue and weight loss. HENT: Negative for hearing loss. Eyes: Negative for blurred vision and double vision. Respiratory: Positive for shortness of breath. Cardiovascular: Positive for chest pain. Negative for palpitations, orthopnea, claudication, leg swelling and PND. Gastrointestinal: Negative for blood in stool, heartburn and melena. Genitourinary: Negative for dysuria, frequency, hematuria and urgency. Musculoskeletal: Negative for back pain and joint pain. Skin: Negative for itching and rash. Neurological: Negative for dizziness and loss of consciousness. Psychiatric/Behavioral: Negative for depression and memory loss. Physical Exam   Constitutional: He is oriented to person, place, and time. He appears well-developed and well-nourished. HENT:   Head: Normocephalic and atraumatic. Eyes: Pupils are equal, round, and reactive to light. Conjunctivae are normal.   Neck: Normal range of motion. Neck supple. No JVD present. Cardiovascular: Normal rate, regular rhythm, S1 normal and S2 normal.  No extrasystoles are present. PMI is not displaced. Exam reveals no gallop and no friction rub. No murmur heard. Pulses:       Carotid pulses are 3+ on the right side and 3+ on the left side. Pulmonary/Chest: Effort normal. He has no rales. Abdominal: Soft. There is no tenderness. Musculoskeletal:         General: No edema. Neurological: He is alert and oriented to person, place, and time. No cranial nerve deficit. Skin: Skin is warm and dry. Psychiatric: He has a normal mood and affect.  His behavior is normal.     Visit Vitals  /68   Pulse 81   Ht 5' 3\" (1.6 m)   Wt 83.5 kg (184 lb)   SpO2 96%   BMI 32.59 kg/m²       Past Medical History:   Diagnosis Date    BPPV (benign paroxysmal positional vertigo) 5/26/2010    CABG x 4     CAD (coronary artery disease), native coronary artery     cath (jan 2014): 40% LM, pLAD 85%, dLAD 70%, dLCx 80%, mRCA 95%. s/p CABG x 4 (LIMA->D, SVG->dLAD, Seq SVG -> RPDA & OM2)    Cardiac catheterization 01/10/2014    pLM 40%. pLAD 85%. dCX 80%. mRCA 95%. CABG recommended.  Cardiac echocardiogram 09/03/2014    Tech difficult. EF 40-45%. Mild, diffuse hypk. Mild LVH. Indeterminate diastolic fx. Mild LAE. No significant valvular heart disease.  Carotid duplex 01/13/2014    Mod 50-69% VALENTIN stenosis. Mild 3-76% LICA stenosis. Occluded right vertebral.       Carpal tunnel syndrome     Cerebrovascular disease     Moderate VALENTIN, mild LICA, occluded R vertebral (Doppler Jan 2014)    Chest pain, musculoskeletal     post CABG    Diabetes ([address]     Dyslipidemia     H/O tobacco use, presenting hazards to health     quit 2014    Headache(784.0)     cluster    Heart attack ([address]     Hiatal hernia     Hypercholesterolemia     Hypertensive heart disease with congestive heart failure ([address]     Internal carotid artery stenosis 5/26/2010    Obesity (BMI 30-39. 9)     S/P colonoscopic polypectomy 09/2017    tubular adenoma, f/u 5 years    Sleep apnea     does not use cpap machine    Stroke ([address] 5/22/09    TIA (no residuals)    Systolic HF (heart failure) (HCC)     LV EF 35% (echo Jan 2014).  Mean LA pressure 32 (Jan 2014)    Vertigo        Social History     Socioeconomic History    Marital status:      Spouse name: Not on file    Number of children: Not on file    Years of education: Not on file    Highest education level: Not on file   Occupational History    Occupation: retired   Social Needs    Financial resource strain: Not on file    Food insecurity:     Worry: Not on file     Inability: Not on file   [employer] needs:     Medical: Not on file     Non-medical: Not on file   Tobacco Use    Smoking status: Former Smoker     Packs/day: 0.25     Years: 10.00     Pack years: 2.50     Types: Cigarettes     Last attempt to quit: 2014     Years since quittin.0    Smokeless tobacco: Never Used    Tobacco comment:  less then 5 ciggerates a week   Substance and Sexual Activity    Alcohol use: Yes     Frequency: Monthly or less     Drinks per session: 1 or 2     Binge frequency: Never     Comment: beer occasionally    Drug use: No    Sexual activity: Not Currently     Partners: Female   Lifestyle    Physical activity:     Days per week: Not on file     Minutes per session: Not on file    Stress: Not on file   Relationships    Social connections:     Talks on phone: Not on file     Gets together: Not on file     Attends [employer] service: Not on file     Active member of club or organization: Not on file     Attends meetings of clubs or organizations: Not on file     Relationship status: Not on file    Intimate partner violence:     Fear of current or ex partner: Not on file     Emotionally abused: Not on file     Physically abused: Not on file     Forced sexual activity: Not on file   Other Topics Concern    Not on file   Social History Narrative    Not on file       Family History   Problem Relation Age of Onset    Cancer Father         prostate    Heart Disease Father     Heart Disease Brother         age 48   Hershell Marleny Other Son         Brain damage    Alcohol abuse Mother         dementia    Heart Attack Brother        Past Surgical History:   Procedure Laterality Date    COLONOSCOPY N/A 2017    COLONOSCOPY with polypectomy performed by Ute [facility]y MD at [facility] GRAFT  14    LIMA to the D1, and GSV to the distal LAD, and anotherGSV sequentially to the PDA of the RCA and to theOM2 and ascending aortic endarterectomy, Dr. Dodie Lawson, 14.     HX CORONARY STENT PLACEMENT      HX UROLOGICAL      repair of spermatocele       Current Outpatient Medications   Medication Sig Dispense Refill    glyBURIDE (DIABETA) 2.5 mg tablet Take 1 Tab by mouth Daily (before breakfast). 30 Tab 5    rosuvastatin (CRESTOR) 20 mg tablet TAKE 1 TABLET BY MOUTH ONCE DAILY 90 Tab 3    lisinopril (PRINIVIL, ZESTRIL) 5 mg tablet TAKE 1 TABLET BY MOUTH ONCE DAILY 90 Tab 3    carvedilol (COREG) 25 mg tablet TAKE 1 TABLET BY MOUTH TWICE DAILY WITH MEALS 180 Tab 3    furosemide (LASIX) 40 mg tablet TAKE 1 TABLET BY MOUTH ONCE DAILY 90 Tab 3    clopidogrel (PLAVIX) 75 mg tab TAKE 1 TABLET BY MOUTH ONCE DAILY 90 Tab 3    potassium chloride (KLOR-CON M10) 10 mEq tablet TAKE ONE TABLET BY MOUTH ONCE DAILY WITH  LASIX 90 Tab 3    aspirin delayed-release 81 mg tablet Take 81 mg by mouth daily.  nitroglycerin (NITROSTAT) 0.4 mg SL tablet 1 Tab by SubLINGual route every five (5) minutes as needed for Chest Pain. 1 Bottle 0    cpap machine kit by Does Not Apply route. Overnight CPAP at 16 CWP with ramp and humidifier. Mask: Simplus FF small or mask of choice. Supplies 99 month. Please send compliance data [id number]. Diagnosis DAYANA. AHI 52 RDI 52 1 Kit 0       EKG: unchanged from previous tracings, normal sinus rhythm, nonspecific ST and T waves changes. ASSESSMENT and PLAN  Encounter Diagnoses   Name Primary?  Chest pain, unspecified type Yes    CAD,CABGx4 Jan.2014,Stent SVG to RPDA,Obtuse marginal2     Dyslipidemia    This patient's chest pain is somewhat atypical but concerning because of the fact that it has been somewhat exertional. At this point I would proceed with stress nuclear cardiac imaging for further evaluation. His blood pressure has been under control and his cholesterol profile has been largely satisfactory with low LDL although HDL remains to be low.

## 2020-01-20 RX ORDER — POTASSIUM CHLORIDE 750 MG/1
TABLET, FILM COATED, EXTENDED RELEASE ORAL
Qty: 90 TAB | Refills: 3 | Status: SHIPPED | OUTPATIENT
Start: 2020-01-20 | End: 2021-04-26 | Stop reason: SDUPTHER

## 2020-06-05 RX ORDER — GLYBURIDE 2.5 MG/1
TABLET ORAL
Qty: 30 TAB | Refills: 0 | OUTPATIENT
Start: 2020-06-05

## 2020-06-08 RX ORDER — GLYBURIDE 2.5 MG/1
TABLET ORAL
Qty: 90 TAB | Refills: 0 | OUTPATIENT
Start: 2020-06-08

## 2020-06-08 NOTE — TELEPHONE ENCOUNTER
Placed call to Mr. Fiona Baker to get him scheduled for a virtual visit. His reply was \"no thank you\" and hung up.

## 2020-06-18 RX ORDER — GLYBURIDE 2.5 MG/1
TABLET ORAL
Qty: 90 TAB | Refills: 0 | OUTPATIENT
Start: 2020-06-18

## 2020-06-18 NOTE — LETTER
6/25/2020 10:51 AM 
 
Mr. Jeanine Dc [address] [facility] [id number] Dear Mr. Quintanillaoswaldkikiallan Luis Armando: 
 
Dorlene Brunner missed you! Please call our office at [phone] and schedule a follow up appointment for your continued care. Sincerely, Rachel Rosa MD

## 2020-06-29 RX ORDER — GLYBURIDE 2.5 MG/1
TABLET ORAL
Qty: 90 TAB | Refills: 0 | OUTPATIENT
Start: 2020-06-29

## 2020-07-05 RX ORDER — GLYBURIDE 2.5 MG/1
TABLET ORAL
Qty: 90 TAB | Refills: 0 | OUTPATIENT
Start: 2020-07-05

## 2020-07-06 NOTE — TELEPHONE ENCOUNTER
Patient to schedule in office care for refills. Well overdue. This has been declined multiple times.

## 2020-07-08 RX ORDER — GLYBURIDE 2.5 MG/1
TABLET ORAL
Qty: 90 TAB | Refills: 0 | OUTPATIENT
Start: 2020-07-08

## 2020-07-08 NOTE — TELEPHONE ENCOUNTER
Spoke with Rosie at [address] to inform her medication has been refused and patient is aware that he is overdue for a follow up with Dr. Walker Mahan

## 2020-07-11 DIAGNOSIS — E11.9 CONTROLLED TYPE 2 DIABETES MELLITUS WITHOUT COMPLICATION, WITHOUT LONG-TERM CURRENT USE OF INSULIN (HCC): Primary | ICD-10-CM

## 2020-07-11 RX ORDER — GLYBURIDE 2.5 MG/1
TABLET ORAL
Qty: 90 TAB | Refills: 0 | OUTPATIENT
Start: 2020-07-11

## 2020-07-11 NOTE — TELEPHONE ENCOUNTER
Nurse to call pharmacy and let them know he is very overdue and we are seeing multiple requests that we are denying. Please stop sending us requests for medication. Also I need to know if he wants to continue care with us or if we should discharge him. Please ask patient if he wants an in person or virtual visit to resume care. If not, please advise we will have to discharge him. If he is Medicare, we can also offer a phone only visit.

## 2020-07-14 NOTE — TELEPHONE ENCOUNTER
Spoke with patient. He wishes to remain under the care of Dr. Kishore Morrison. He has been scheduled for 8/4/2020 @ 11:45 (first available). Patient is completely out of Glyburide.

## 2020-07-21 RX ORDER — GLYBURIDE 2.5 MG/1
TABLET ORAL
Qty: 30 TAB | Refills: 0 | Status: SHIPPED | OUTPATIENT
Start: 2020-07-21 | End: 2020-09-14 | Stop reason: SDUPTHER

## 2020-07-21 NOTE — TELEPHONE ENCOUNTER
Called to let him know we sent in a 30 day temporary supply of meds but he did not answer the phone. He is overdue and scheduled.   He no-showed for his cardiac stress test.

## 2020-07-29 LAB
BUN SERPL-MCNC: 22 MG/DL (ref 8–27)
BUN/CREAT SERPL: 16 (ref 10–24)
CALCIUM SERPL-MCNC: 9.1 MG/DL (ref 8.6–10.2)
CHLORIDE SERPL-SCNC: 99 MMOL/L (ref 96–106)
CO2 SERPL-SCNC: 23 MMOL/L (ref 20–29)
CREAT SERPL-MCNC: 1.38 MG/DL (ref 0.76–1.27)
GLUCOSE SERPL-MCNC: 228 MG/DL (ref 65–99)
HBA1C MFR BLD: 6.8 % (ref 4.8–5.6)
INTERPRETATION: NORMAL
Lab: NORMAL
POTASSIUM SERPL-SCNC: 4.4 MMOL/L (ref 3.5–5.2)
SODIUM SERPL-SCNC: 139 MMOL/L (ref 134–144)

## 2020-08-06 ENCOUNTER — OFFICE VISIT (OUTPATIENT)
Dept: CARDIOLOGY CLINIC | Age: 65
End: 2020-08-06

## 2020-08-06 VITALS — HEIGHT: 63 IN | WEIGHT: 188 LBS | OXYGEN SATURATION: 97 % | BODY MASS INDEX: 33.31 KG/M2 | HEART RATE: 82 BPM

## 2020-08-06 DIAGNOSIS — I25.10 CAD S/P PERCUTANEOUS CORONARY ANGIOPLASTY: Primary | ICD-10-CM

## 2020-08-06 DIAGNOSIS — Z98.61 CAD S/P PERCUTANEOUS CORONARY ANGIOPLASTY: Primary | ICD-10-CM

## 2020-08-06 NOTE — PROGRESS NOTES
Katina Anguiano presents today for   Chief Complaint   Patient presents with    Follow-up     6 month follow up - no cardiac complaints        Katina Anguiano preferred language for health care discussion is english/other. Is someone accompanying this pt? no    Is the patient using any DME equipment during [address]? no    Depression Screening:  3 most recent PHQ Screens 8/6/2020   Little interest or pleasure in doing things Not at all   Feeling down, depressed, irritable, or hopeless Not at all   Total Score PHQ 2 0       Learning Assessment:  Learning Assessment 8/12/2014   PRIMARY LEARNER Patient   HIGHEST LEVEL OF EDUCATION - PRIMARY LEARNER  -   BARRIERS PRIMARY LEARNER -   [facility]    NEED -   LEARNER PREFERENCE PRIMARY DEMONSTRATION   ANSWERED BY patient   RELATIONSHIP SELF       Abuse Screening:  Abuse Screening Questionnaire 3/12/2019   Do you ever feel afraid of your partner? N   Are you in a relationship with someone who physically or mentally threatens you? N   Is it safe for you to go home? Y       Fall Risk  Fall Risk Assessment, last 12 mths 10/16/2017   Able to walk? Yes   Fall in past 12 months? No       Pt currently taking Anticoagulant therapy? ASA 81mg every day & Plavix     Coordination of Care:  1. Have you been to the ER, urgent care clinic since your last visit? Hospitalized since your last visit? no    2. Have you seen or consulted any other health care providers outside of the [address] [place] [address] since your last visit? Include any pap smears or colon screening.  no

## 2020-08-06 NOTE — PROGRESS NOTES
Priyanka Beyer    No chief complaint on file. VAN Beyer is a 59 y.o. with coronary disease here for routine follow-up evaluation. He has no complaints today no chest pain no shortness of breath. He is back to playing golf and is quite happy. He has a follow-up with his primary care next month. He has history of known coronary artery disease.  He underwent CABG X four in January of 2014, which consisted of:    1. LIMA to the proximal LAD               2. Single SVG to the distal LAD               3. Sequential SVG to the RPDA and OM2.       He was readmitted with a non-STEMI myocardial infarction in January of 2015 and underwent cardiac catheterization, which demonstrated:    1. Patent LIMA to the LAD                2. Total occlusion of the SVG to the LAD                3. 95% ostial stenosis of the sequential SVG to the RPDA and obtuse marginal branch 2 with 70% OM just after the anastomosis.       He subsequently underwent successful stenting of the vein graft to the RPDA and OM along with OM branch with a Xience drug-eluting stent.       He has history of hypertension, diabetes mellitus, and dyslipidemia. Vandana Romero does have history of metabolic syndrome with high triglycerides and low HDL along with diabetes and hypertension.       His echocardiogram in March of 2014 demonstrated mild to moderate LV dysfunction with EF in the 40-45% range. Gwenyth Stamp was no significant valvular dysfunction. Gwenyth Stamp was no evidence of significant pulmonary hypertension.       Past Medical History:   Diagnosis Date    BPPV (benign paroxysmal positional vertigo) 5/26/2010    CABG x 4     CAD (coronary artery disease), native coronary artery     cath (jan 2014): 40% LM, pLAD 85%, dLAD 70%, dLCx 80%, mRCA 95%. s/p CABG x 4 (LIMA->D, SVG->dLAD, Seq SVG -> RPDA & OM2)    Cardiac catheterization 01/10/2014    pLM 40%. pLAD 85%. dCX 80%. mRCA 95%. CABG recommended.     Cardiac echocardiogram 09/03/2014    Tech difficult. EF 40-45%. Mild, diffuse hypk. Mild LVH. Indeterminate diastolic fx. Mild LAE. No significant valvular heart disease.  Carotid duplex 01/13/2014    Mod 50-69% VALENTIN stenosis. Mild 9-58% LICA stenosis. Occluded right vertebral.       Carpal tunnel syndrome     Cerebrovascular disease     Moderate VALENTIN, mild LICA, occluded R vertebral (Doppler Jan 2014)    Chest pain, musculoskeletal     post CABG    Diabetes ([address]     Dyslipidemia     H/O tobacco use, presenting hazards to health     quit 2014    Headache(784.0)     cluster    Heart attack ([address]     Hiatal hernia     Hypercholesterolemia     Hypertensive heart disease with congestive heart failure ([address]     Internal carotid artery stenosis 5/26/2010    Obesity (BMI 30-39. 9)     S/P colonoscopic polypectomy 09/2017    tubular adenoma, f/u 5 years    Sleep apnea     does not use cpap machine    Stroke ([facility] 5/22/09    TIA (no residuals)    Systolic HF (heart failure) (HCC)     LV EF 35% (echo Jan 2014). Mean LA pressure 32 (Jan 2014)    Vertigo        Past Surgical History:   Procedure Laterality Date    COLONOSCOPY N/A 9/7/2017    COLONOSCOPY with polypectomy performed by Nancy Noe MD at [facility]  1/14/14    LIMA to the D1, and GSV to the distal LAD, and anotherGSV sequentially to the PDA of the RCA and to theOM2 and ascending aortic endarterectomy, Dr. Tiara Madden, 1/14/14.     HX CORONARY STENT PLACEMENT      HX UROLOGICAL      repair of spermatocele       Current Outpatient Medications   Medication Sig Dispense Refill    clopidogreL (PLAVIX) 75 mg tab Take 1 tablet by mouth once daily 90 Tab 3    glyBURIDE (DIABETA) 2.5 mg tablet TAKE 1 TABLET BY MOUTH ONCE DAILY BEFORE BREAKFAST 30 Tab 0    carvediloL (COREG) 25 mg tablet TAKE 1 TABLET BY MOUTH TWICE DAILY WITH MEALS 90 Tab 3    potassium chloride SR (KLOR-CON 10) 10 mEq tablet TAKE 1 TABLET BY MOUTH ONCE DAILY WITH  LASIX 90 Tab 3    rosuvastatin (CRESTOR) 20 mg tablet TAKE 1 TABLET BY MOUTH ONCE DAILY 90 Tab 3    lisinopril (PRINIVIL, ZESTRIL) 5 mg tablet TAKE 1 TABLET BY MOUTH ONCE DAILY 90 Tab 3    furosemide (LASIX) 40 mg tablet TAKE 1 TABLET BY MOUTH ONCE DAILY 90 Tab 3    potassium chloride (KLOR-CON M10) 10 mEq tablet TAKE ONE TABLET BY MOUTH ONCE DAILY WITH  LASIX 90 Tab 3    aspirin delayed-release 81 mg tablet Take 81 mg by mouth daily.  nitroglycerin (NITROSTAT) 0.4 mg SL tablet 1 Tab by SubLINGual route every five (5) minutes as needed for Chest Pain. 1 Bottle 0    cpap machine kit by Does Not Apply route. Overnight CPAP at 16 CWP with ramp and humidifier. Mask: Simplus FF small or mask of choice. Supplies 99 month. Please send compliance data [id number]. Diagnosis DAYANA.  AHI 52 RDI 52 1 Kit 0       Allergies   Allergen Reactions    Atorvastatin Myalgia     High dose    Peanut Diarrhea and Nausea and Vomiting    Acetaminophen Other (comments)     hallucinations    Dimethicone Rash    Percocet [Oxycodone-Acetaminophen] Other (comments)     hallucinations       Social History     Socioeconomic History    Marital status:      Spouse name: Not on file    Number of children: Not on file    Years of education: Not on file    Highest education level: Not on file   Occupational History    Occupation: retired   Social Needs    Financial resource strain: Not on file    Food insecurity     Worry: Not on file     Inability: Not on file   Spireon needs     Medical: Not on file     Non-medical: Not on file   Tobacco Use    Smoking status: Former Smoker     Packs/day: 0.25     Years: 10.00     Pack years: 2.50     Types: Cigarettes     Last attempt to quit: 2014     Years since quittin.5    Smokeless tobacco: Never Used    Tobacco comment:  less then 5 ciggerates a week   Substance and Sexual Activity    Alcohol use: Yes     Frequency: Monthly or less     Drinks per session: 1 or 2     Binge frequency: Never     Comment: beer occasionally    Drug use: No    Sexual activity: Not Currently     Partners: Female   Lifestyle    Physical activity     Days per week: Not on file     Minutes per session: Not on file    Stress: Not on file   Relationships    Social connections     Talks on phone: Not on file     Gets together: Not on file     Attends Sikhism service: Not on file     Active member of club or organization: Not on file     Attends meetings of clubs or organizations: Not on file     Relationship status: Not on file    Intimate partner violence     Fear of current or ex partner: Not on file     Emotionally abused: Not on file     Physically abused: Not on file     Forced sexual activity: Not on file   Other Topics Concern    Not on file   Social History Narrative    Not on file    active, playing golf, has granddaughter    FH + father CABG and 9 stents but still alive at 80    Review of Systems    14 pt Review of Systems is negative unless otherwise mentioned in the HPI. Wt Readings from Last 3 Encounters:   01/14/20 83.5 kg (184 lb)   11/12/19 78 kg (172 lb)   07/16/19 82.1 kg (181 lb)     Temp Readings from Last 3 Encounters:   11/12/19 98.3 °F (36.8 °C) (Oral)   03/12/19 98.9 °F (37.2 °C) (Oral)   03/12/19 98.9 °F (37.2 °C) (Oral)     BP Readings from Last 3 Encounters:   01/14/20 124/68   11/12/19 114/62   07/16/19 122/70     Pulse Readings from Last 3 Encounters:   01/14/20 81   11/12/19 72   07/16/19 72            Physical Exam:    There were no vitals taken for this visit.    Physical Exam    EKG today shows: NSR, normal axis and intervals, no ST segment abnormalities    Lab Results   Component Value Date/Time    Cholesterol, total 151 11/05/2019 11:24 AM    HDL Cholesterol 33 (L) 11/05/2019 11:24 AM    LDL, calculated 63 11/05/2019 11:24 AM    VLDL, calculated 55 (H) 11/05/2019 11:24 AM    Triglyceride 273 (H) 11/05/2019 11:24 AM    CHOL/HDL Ratio 7.0 (H) 11/27/2015 09:30 AM Lab Results   Component Value Date/Time    Hemoglobin A1c 6.8 (H) 07/28/2020 01:11 PM    Hemoglobin A1c (POC) 6.2 03/12/2019 10:02 AM     Stress test 2017:  CONCLUSIONS  1. Abnormal and intermediate risk pharmacological nuclear stress test.  2. Small to medium sized, dense, fixed anterolateral perfusion defect  consistent with prior myocardial infarction. 3. No reversible perfusion image abnormalities significant for  myocardial ischemia. 4. Normal left ventricular size, mildly to moderately depressed left  ventricular systolic function, ejection fraction calculated at 40%. Impression and Plan:  Alfredo Guerra is a 59 y.o. with:    1.) CAD s/p CABG 2014, neg nuc 2017  2.) Normal LV function  3.) DM2 with dyslipidemia  4.) HTN  5.) Metabolic syndrome  6.) DAYANA, on CPAP    1.) Cont med therapy  2.) Agree with continued DAPT  3.) No further recs, doesn't need stress test at this time  4.) RTC yearly, call me sooner if symptoms    Thank you for allowing me to participate in the care of your patient, please do not hesitate to call with questions or concerns.         Jackeline Hays, DO

## 2020-08-14 ENCOUNTER — HOSPITAL ENCOUNTER (EMERGENCY)
Age: 65
Discharge: HOME | End: 2020-08-14
Attending: EMERGENCY MEDICINE
Payer: MEDICARE

## 2020-08-14 ENCOUNTER — HOSPITAL ENCOUNTER (EMERGENCY)
Age: 65
Discharge: STILL ADMITTED OR ADMINISTRATIVE | End: 2020-08-14
Attending: EMERGENCY MEDICINE
Payer: MEDICARE

## 2020-08-14 VITALS
HEART RATE: 74 BPM | OXYGEN SATURATION: 97 % | TEMPERATURE: 98.1 F | DIASTOLIC BLOOD PRESSURE: 48 MMHG | RESPIRATION RATE: 16 BRPM | BODY MASS INDEX: 31.07 KG/M2 | WEIGHT: 182 LBS | SYSTOLIC BLOOD PRESSURE: 122 MMHG | HEIGHT: 64 IN

## 2020-08-14 DIAGNOSIS — M60.08 ABSCESS OF MUSCLE OF BACK: Primary | ICD-10-CM

## 2020-08-14 PROCEDURE — 99282 EMERGENCY DEPT VISIT SF MDM: CPT

## 2020-08-14 PROCEDURE — 75810000275 HC EMERGENCY DEPT VISIT NO LEVEL OF CARE

## 2020-08-14 NOTE — ED NOTES
Ingrid Gómez is a 59 y.o. male that was discharged in good condition. The patients diagnosis, condition and treatment were explained to  patient and aftercare instructions were given. The patient verbalized understanding. Patient armband removed and shredded.

## 2020-08-14 NOTE — ED PROVIDER NOTES
EMERGENCY DEPARTMENT HISTORY AND PHYSICAL EXAM    9:18 AM      Date: 8/14/2020  Patient Name: Landy Falk    History of Presenting Illness     Chief Complaint   Patient presents with    Abscess         History Provided By: Patient    Additional History (Context): Landy Falk is a 59 y.o. male with Past medical history of CAD, mid back abscess that is chronic and recurrent who presents with chief complaint of an abscess that is in the middle of his back between his shoulder blades that is been bothering him for over a year. He states that his daughter was able to squeeze out some pus from the area last night. He reports that months ago his PCP started him on antibiotics for what was a boil at that time and he got better. He is here today wanting the core cut out. He reports that sometimes the area is painful but not now. He says that there is no palpable mass at the area this time. He denies any fever, redness, numbness, shortness of breath, and no other complaint. PCP: Arlene Mo MD        Past History     Past Medical History:  Past Medical History:   Diagnosis Date    BPPV (benign paroxysmal positional vertigo) 5/26/2010    CABG x 4     CAD (coronary artery disease), native coronary artery     cath (jan 2014): 40% LM, pLAD 85%, dLAD 70%, dLCx 80%, mRCA 95%. s/p CABG x 4 (LIMA->D, SVG->dLAD, Seq SVG -> RPDA & OM2)    Cardiac catheterization 01/10/2014    pLM 40%. pLAD 85%. dCX 80%. mRCA 95%. CABG recommended.  Cardiac echocardiogram 09/03/2014    Tech difficult. EF 40-45%. Mild, diffuse hypk. Mild LVH. Indeterminate diastolic fx. Mild LAE. No significant valvular heart disease.  Carotid duplex 01/13/2014    Mod 50-69% VALENTIN stenosis. Mild 1-14% LICA stenosis.   Occluded right vertebral.       Carpal tunnel syndrome     Cerebrovascular disease     Moderate VALENTIN, mild LICA, occluded R vertebral (Doppler Jan 2014)    Chest pain, musculoskeletal     post CABG    [facility]     Dyslipidemia     H/O tobacco use, presenting hazards to health     quit     Headache(784.0)     cluster    [facility] attack ([facility]     Hiatal hernia     Hypercholesterolemia     Hypertensive heart disease with congestive heart failure ([facility]     Internal carotid artery stenosis 2010    Obesity (BMI 30-39. 9)     S/P colonoscopic polypectomy 2017    tubular adenoma, f/u 5 years    Sleep apnea     does not use cpap machine    Stroke ([facility] 09    TIA (no residuals)    Systolic HF (heart failure) (HCC)     LV EF 35% (echo 2014). Mean LA pressure 32 (2014)    Vertigo        Past Surgical History:  Past Surgical History:   Procedure Laterality Date    COLONOSCOPY N/A 2017    COLONOSCOPY with polypectomy performed by Kenia Diaz [facility] at [facility]  14    LIMA to the D1, and GSV to the distal LAD, and anotherGSV sequentially to the PDA of the RCA and to theOM2 and ascending aortic endarterectomy, Dr. Phoenix Butler, 14.  HX CORONARY STENT PLACEMENT      HX UROLOGICAL      repair of spermatocele       Family History:  Family History   Problem Relation Age of Onset    Cancer Father         prostate    Heart Disease Father     Heart Disease Brother         age 48   Nunez Other Son         Brain damage    Alcohol abuse Mother         dementia    Heart Attack Brother        Social History:  Social History     Tobacco Use    Smoking status: Former Smoker     Packs/day: 0.25     Years: 10.00     Pack years: 2.50     Types: Cigarettes     Last attempt to quit: 2014     Years since quittin.6    Smokeless tobacco: Never Used    Tobacco comment:  less then 5 ciggerates a week   Substance Use Topics    Alcohol use: Yes     Frequency: Monthly or less     Drinks per session: 1 or 2     Binge frequency: Never     Comment: beer occasionally    Drug use: No       Allergies:   Allergies   Allergen Reactions    Atorvastatin Myalgia High dose    Peanut Diarrhea and Nausea and Vomiting    Acetaminophen Other (comments)     hallucinations    Dimethicone Rash    Percocet [Oxycodone-Acetaminophen] Other (comments)     hallucinations         Review of Systems       Review of Systems   Constitutional: Negative for chills and fever. HENT: Negative. Respiratory: Negative for cough and shortness of breath. Cardiovascular: Negative. Gastrointestinal: Negative for abdominal pain, nausea and vomiting. Musculoskeletal: Negative for neck stiffness. Skin: Negative for rash. Chronic recurrent abscess middle of back   Neurological: Negative for dizziness, weakness, numbness and headaches. Psychiatric/Behavioral: Negative for confusion and dysphoric mood. All other systems reviewed and are negative. Physical Exam     Visit Vitals  /48   Pulse 74   Temp 98.1 °F (36.7 °C)   Resp 16   Ht 5' 4\" (1.626 m)   Wt 82.6 kg (182 lb)   SpO2 97%   BMI 31.24 kg/m²         Physical Exam  Vitals signs and nursing note reviewed. Constitutional:       General: He is not in acute distress. Appearance: He is well-developed. He is not diaphoretic. HENT:      Head: Normocephalic and atraumatic. Eyes:      General: No scleral icterus. Conjunctiva/sclera: Conjunctivae normal.      Pupils: Pupils are equal, round, and reactive to light. Neck:      Musculoskeletal: Normal range of motion and neck supple. Cardiovascular:      Rate and Rhythm: Normal rate. Comments: Capillary refill < 3 seconds  Pulmonary:      Effort: Pulmonary effort is normal. No respiratory distress. Breath sounds: Normal breath sounds. Musculoskeletal: Normal range of motion. Skin:     General: Skin is warm and dry. Findings: No erythema or rash. Comments: 2 small holes in the mid back between shoulder blades which he is states been there for several months and he states that sometimes pus comes out of that.     There is no erythema at the site and no fluctuance. When I palpate very deeply at the area at the muscle, there is some whitish-yellow puslike/cream like substance that oozes out, but only very scant amount. Patient does report that her daughter squeezed out a lot more last night. He has no bony tenderness to palpation and to palpate the area is not tender. Neurological:      Mental Status: He is alert and oriented to person, place, and time. Cranial Nerves: No cranial nerve deficit. Motor: No weakness. Psychiatric:         Thought Content: Thought content normal.           Diagnostic Study Results     Labs -  No results found for this or any previous visit (from the past 12 hour(s)). Radiologic Studies -   No orders to display         Medical Decision Making   I am the first provider for this patient. I reviewed the vital signs, available nursing notes, past medical history, past surgical history, family history and social history. Vital Signs-Reviewed the patient's vital signs. Records Reviewed: Nursing Notes and Old Medical Records (Time of Review: 9:20 AM)    Provider Notes (Medical Decision Making): DDX: Recurrent abscess on the mid back between shoulder blades. No current fluctuance, erythema or tenderness at the area. He states that his daughter express pus out of it last night. I was able to palpate very deeply into the muscle and scant amount of drainage came out of 2 tiny holes at the site. I explained to him that I would not be able to do a procedure to remove an abscess shell as he requested. We will give him referral to general surgery. He refuses prescription for antibiotic. He has no fever and his vital signs are stable. MDM    Medications - No data to display      ED Course: Progress Notes, Reevaluation, and Consults:  I have reassessed the patient. I have discussed the workup, results and plan with the patient and patient is in agreement.   Patient was discharge in stable condition. Patient was given outpatient follow up. Patient is to return to emergency department if any new or worsening condition. Diagnosis     Clinical Impression:   1. Abscess of muscle of back        Disposition: Discharged    Follow-up Information     Follow up With Specialties Details Why Contact Info    Jim Valle MD General Surgery Schedule an appointment as soon as possible for a visit  Nithya Hobbs [address] Angelita Arizmendi MD Family Medicine Schedule an appointment as soon as possible for a visit  [address]  [phone]      [address] [facility] EMERGENCY DEPT Emergency Medicine  As needed, If symptoms worsen [address] [facility]  [phone]           Discharge Medication List as of 8/14/2020 10:02 AM      CONTINUE these medications which have NOT CHANGED    Details   clopidogreL (PLAVIX) 75 mg tab Take 1 tablet by mouth once daily, Normal, Disp-90 Tab,R-3      glyBURIDE (DIABETA) 2.5 mg tablet TAKE 1 TABLET BY MOUTH ONCE DAILY BEFORE BREAKFAST, Normal, Disp-30 Tab,R-0Patient overdue. 30 days okay. Please call patient when ready. carvediloL (COREG) 25 mg tablet TAKE 1 TABLET BY MOUTH TWICE DAILY WITH MEALS, Normal, Disp-90 Tab, R-3      rosuvastatin (CRESTOR) 20 mg tablet TAKE 1 TABLET BY MOUTH ONCE DAILY, Normal, Disp-90 Tab, R-3      furosemide (LASIX) 40 mg tablet TAKE 1 TABLET BY MOUTH ONCE DAILY, Normal, Disp-90 Tab, R-3      potassium chloride (KLOR-CON M10) 10 mEq tablet TAKE ONE TABLET BY MOUTH ONCE DAILY WITH  LASIX, Normal, Disp-90 Tab, R-3      aspirin delayed-release 81 mg tablet Take 81 mg by mouth daily. , Historical Med      cpap machine kit by Does Not Apply route. Overnight CPAP at 16 CWP with ramp and humidifier. Mask: Simplus FF small or mask of choice. Supplies 99 month. Please send compliance data [id number]. Diagnosis DAYANA.  AHI 52 RDI 52, Print, Disp-1 Kit, R-0      potassium chloride SR (KLOR-CON 10) 10 mEq tablet TAKE 1 TABLET BY MOUTH ONCE DAILY WITH  LASIX, Normal, Disp-90 Tab, R-3      lisinopril (PRINIVIL, ZESTRIL) 5 mg tablet TAKE 1 TABLET BY MOUTH ONCE DAILY, Normal, Disp-90 Tab, R-3      nitroglycerin (NITROSTAT) 0.4 mg SL tablet 1 Tab by SubLINGual route every five (5) minutes as needed for Chest Pain., Normal, Disp-1 Bottle, R-0               DO Norah Orellana medical dictation software was used for portions of this report. Unintended transcription errors may occur. My signature above authenticates this document and my orders, the final    diagnosis (es), discharge prescription (s), and instructions in the Epic    record.

## 2020-08-16 RX ORDER — GLYBURIDE 2.5 MG/1
TABLET ORAL
Qty: 30 TAB | Refills: 0 | OUTPATIENT
Start: 2020-08-16

## 2020-08-17 ENCOUNTER — PATIENT OUTREACH (OUTPATIENT)
Dept: CASE MANAGEMENT | Age: 65
End: 2020-08-17

## 2020-08-17 NOTE — PROGRESS NOTES
Patient contacted regarding recent discharge and COVID-19 risk. Discussed COVID-19 related testing which was not done at this time. Test results were not done. Patient informed of results, if available? N/a      Care Transition Nurse/ Ambulatory Care Manager contacted the patient by telephone to perform post discharge assessment. Verified name and  with patient as identifiers. Patient has following risk factors of: heart failure and diabetes. CTN/ACM reviewed discharge instructions, medical action plan and red flags related to discharge diagnosis. Reviewed and educated them on any new and changed medications related to discharge diagnosis. Advised obtaining a 90-day supply of all daily and as-needed medications. Education provided regarding infection prevention, and signs and symptoms of COVID-19 and when to seek medical attention with patient who verbalized understanding. Discussed exposure protocols and quarantine from [address] you at higher risk for severe illness  and given an opportunity for questions and concerns. The patient agrees to contact the COVID-19 hotline [phone] or PCP office for questions related to their healthcare. CTN/ACM provided contact information for future reference. From CDC: Are you at higher risk for severe illness?  Wash your hands often.  Avoid close contact (6 feet, which is about two arm lengths) with people who are sick.  Put distance between yourself and other people if COVID-19 is spreading in your community.  Clean and disinfect frequently touched surfaces.  Avoid all cruise travel and non-essential air travel.  Call your healthcare professional if you have concerns about COVID-19 and your underlying condition or if you are sick.     For more information on steps you can take to protect yourself, see CDC's How to Protect Yourself      Patient/family/caregiver given information for Jose David Gutiérrez and agrees to enroll no  Patient's preferred e-mail:  declined  Patient's preferred phone number: [phone]  Based on Loop alert triggers, patient will be contacted by nurse care manager for worsening symptoms. Plan for follow-up call in 7-14 days based on severity of symptoms and risk factors.

## 2020-08-17 NOTE — TELEPHONE ENCOUNTER
Concerned about repeat cancellation. I am not sure that if I refill, he will cancel again. I will wait for appt to fill.

## 2020-08-18 ENCOUNTER — OFFICE VISIT (OUTPATIENT)
Dept: SURGERY | Age: 65
End: 2020-08-18

## 2020-08-18 VITALS
RESPIRATION RATE: 18 BRPM | SYSTOLIC BLOOD PRESSURE: 123 MMHG | TEMPERATURE: 97.5 F | HEART RATE: 74 BPM | HEIGHT: 63 IN | WEIGHT: 190 LBS | BODY MASS INDEX: 33.66 KG/M2 | OXYGEN SATURATION: 99 % | DIASTOLIC BLOOD PRESSURE: 76 MMHG

## 2020-08-18 DIAGNOSIS — Z01.818 PREOPERATIVE TESTING: ICD-10-CM

## 2020-08-18 DIAGNOSIS — R22.2 MASS OF SUBCUTANEOUS TISSUE OF BACK: Primary | ICD-10-CM

## 2020-08-18 NOTE — H&P (VIEW-ONLY)
[facility] General Surgery Subjective: HPI: Patient is a very pleasant obese-BMI 33.66 kg per metered square 80-year-old male with a past medical history remarkable for hypertension, hypercholesterolemia, hypertensive heart disease with congestive heart failure, diabetes chart patient sitting upright in bed today breakfast is at her side, coronary artery disease, cerebrovascular accident, TIA, and history of tobacco abuse. He is referred by Dr. Ezio Ta for evaluation and management of a soft tissue mass of the back. The mass is overlying the thoracic spine and soft tissue. It is tender to touch. He states that he has pain which shoots to both sides of his abdomens Patient Active Problem List  
 Diagnosis Date Noted  Advance care planning 09/15/2016  Severe obstructive sleep apnea 02/10/2015  Carpal tunnel syndrome on both sides 01/16/2015  Chest pain, musculoskeletal   
 Obesity (BMI 30-39. 9)  H/O tobacco use, presenting hazards to health  Cerebrovascular disease  Dyslipidemia  CAD S/P percutaneous coronary angioplasty  S/P CABG x 4 01/16/2014 Past Medical History:  
Diagnosis Date  BPPV (benign paroxysmal positional vertigo) 5/26/2010  CABG x 4   
 CAD (coronary artery disease), native coronary artery   
 cath (jan 2014): 40% LM, pLAD 85%, dLAD 70%, dLCx 80%, mRCA 95%. s/p CABG x 4 (LIMA->D, SVG->dLAD, Seq SVG -> RPDA & OM2)  Cardiac catheterization 01/10/2014 pLM 40%. pLAD 85%. dCX 80%. mRCA 95%. CABG recommended.  Cardiac echocardiogram 09/03/2014 Tech difficult. EF 40-45%. Mild, diffuse hypk. Mild LVH. Indeterminate diastolic fx. Mild LAE. No significant valvular heart disease.  Carotid duplex 01/13/2014 Mod 50-69% VALENTIN stenosis. Mild 4-10% LICA stenosis. Occluded right vertebral.     
 Carpal tunnel syndrome  Cerebrovascular disease Moderate VALENTIN, mild LICA, occluded R vertebral (Doppler 2014)  Chest pain, musculoskeletal   
 post CABG  
 Diabetes ([address]  Dyslipidemia  H/O tobacco use, presenting hazards to health   
 quit   Headache(784.0)   
 cluster  Heart attack ([address]  Hiatal hernia  Hypercholesterolemia  Hypertensive heart disease with congestive heart failure ([address]  Internal carotid artery stenosis 2010  Obesity (BMI 30-39. 9)  S/P colonoscopic polypectomy 2017  
 tubular adenoma, f/u 5 years  Sleep apnea   
 does not use cpap machine  Stroke ([address] 09 TIA (no residuals)  Systolic HF (heart failure) ([address] LV EF 35% (echo 2014). Mean LA pressure 32 (2014)  Vertigo Past Surgical History:  
Procedure Laterality Date  COLONOSCOPY N/A 2017 COLONOSCOPY with polypectomy performed by Kenia Diaz MD at [address] [place] [address] GRAFT  14 LIMA to the D1, and GSV to the distal LAD, and anotherGSV sequentially to the PDA of the RCA and to theOM2 and ascending aortic endarterectomy, Dr. Phoenix Butler, 14.  HX CORONARY STENT PLACEMENT    
 HX UROLOGICAL    
 repair of spermatocele Family History Problem Relation Age of Onset  Cancer Father   
     prostate  Heart Disease Father  Heart Disease Brother   
     age 55  
 Other Son   
     Brain damage  Alcohol abuse Mother   
     dementia  Heart Attack Brother Social History Tobacco Use  Smoking status: Former Smoker Packs/day: 0.25 Years: 10.00 Pack years: 2.50 Types: Cigarettes Last attempt to quit: 2014 Years since quittin.6  Smokeless tobacco: Never Used  Tobacco comment:  less then 5 ciggerates a week Substance Use Topics  Alcohol use: Yes Frequency: Monthly or less Drinks per session: 1 or 2 Binge frequency: Never Comment: beer occasionally Allergies Allergen Reactions  Atorvastatin Myalgia High dose  Peanut Diarrhea and Nausea and Vomiting  Acetaminophen Other (comments)  
  hallucinations  Dimethicone Rash  Percocet [Oxycodone-Acetaminophen] Other (comments)  
  hallucinations Prior to Admission medications Medication Sig Start Date End Date Taking? Authorizing Provider  
clopidogreL (PLAVIX) 75 mg tab Take 1 tablet by mouth once daily 8/6/20  Yes Marcie MA NP  
glyBURIDE (DIABETA) 2.5 mg tablet TAKE 1 TABLET BY MOUTH ONCE DAILY BEFORE BREAKFAST 7/21/20  Yes Juliette Bolaños MD  
carvediloL (COREG) 25 mg tablet TAKE 1 TABLET BY MOUTH TWICE DAILY WITH MEALS 4/15/20  Yes Marcie MA NP  
potassium chloride SR (KLOR-CON 10) 10 mEq tablet TAKE 1 TABLET BY MOUTH ONCE DAILY WITH  LASIX 1/20/20  Yes Guy Ham MD  
rosuvastatin (CRESTOR) 20 mg tablet TAKE 1 TABLET BY MOUTH ONCE DAILY 10/14/19  Yes Martina Jesus MD  
lisinopril (PRINIVIL, ZESTRIL) 5 mg tablet TAKE 1 TABLET BY MOUTH ONCE DAILY 9/16/19  Yes Martina Jesus MD  
furosemide (LASIX) 40 mg tablet TAKE 1 TABLET BY MOUTH ONCE DAILY 8/29/19  Yes Marcie Ortiz NP  
potassium chloride (KLOR-CON M10) 10 mEq tablet TAKE ONE TABLET BY MOUTH ONCE DAILY WITH  LASIX 11/28/18  Yes Guy Ham MD  
aspirin delayed-release 81 mg tablet Take 81 mg by mouth daily. Yes Provider, Historical  
nitroglycerin (NITROSTAT) 0.4 mg SL tablet 1 Tab by SubLINGual route every five (5) minutes as needed for Chest Pain. 9/15/16  Yes Juliette Bolaños MD  
cpap machine kit by Does Not Apply route. Overnight CPAP at 16 CWP with ramp and humidifier. Mask: Simplus FF small or mask of choice. Supplies 99 month. Please send compliance data [id number]. Diagnosis DAYANA. AHI 52 RDI 52 4/16/15  Yes Myles David MD  
 
 
Review of Systems:   
14 systems were reviewed. The results are as above in the HPI and otherwise negative. Objective:  
 
Vitals: 08/18/20 4455 BP: 123/76 Pulse: 74 Resp: 18 Temp: 97.5 °F (36.4 °C) SpO2: 99% Weight: 86.2 kg (190 lb) Height: 5' 3\" (1.6 m) Physical Exam: 
GENERAL: alert, cooperative, no distress, appears stated age, EYE: conjunctivae/corneas clear. PERRL, EOM's intact. THROAT & NECK: normal and no erythema or exudates noted. ,   
LYMPHATIC: Cervical, supraclavicular, and axillary nodes normal. ,  
LUNG: clear to auscultation bilaterally, HEART: regular rate and rhythm, S1, S2 normal, no murmur, click, rub or gallop, ABDOMEN: soft, non-tender. Bowel sounds normal. No masses,  no organomegaly, EXTREMITIES:  extremities normal, atraumatic, no cyanosis or edema, SKIN: 1.75 cm x 2.5 cm mass in the skin and soft tissue overlying the thoracic spine NEUROLOGIC: AOx3. Cranial nerves 2-12 and sensation grossly intact. ,  
 
Data Review:  to be done Mr. Krish Victor has a reminder for a \"due or due soon\" health maintenance. I have asked that he contact his primary care provider for follow-up on this health maintenance. Impression: · Patient with a epidermoid cystic mass of the soft tissues of the back. Plan:  
 
· Excisional biopsy of soft tissue mass of the back · Consent on chart · Preoperative orders written Signed By: Leatha Jim MD   
 August 18, 2020

## 2020-08-18 NOTE — PATIENT INSTRUCTIONS
Epidermoid Cyst: Care Instructions Your Care Instructions An epidermoid (say \"bl-bnr-MLQ-adriana\") cyst is a lump just under the skin. These cysts can form when a hair follicle becomes blocked. They are common in acne and may occur on the face, neck, back, and genitals. However, they can form anywhere on the body. These cysts are not cancer and do not lead to cancer. They tend not to hurt, but they can sometimes become swollen and painful. They also may break open (rupture) and cause scarring. These cysts sometimes do not cause problems and may not need treatment. If you have a cyst that is swollen and hurts, your doctor may inject it with a medicine to help it heal. But it is more likely that a painful cyst will need to be removed. Your doctor will give you a shot of numbing medicine and cut into the cyst to drain it or remove it. This makes the symptoms go away. Follow-up care is a key part of your treatment and safety. Be sure to make and go to all appointments, and call your doctor if you are having problems. It's also a good idea to know your test results and keep a list of the medicines you take. How can you care for yourself at home? · Do not squeeze the cyst or poke it with a needle to open it. This can cause swelling, redness, and infection. · Always have a doctor look at any new lumps you get to make sure that they are not serious. When should you call for help? Watch closely for changes in your health, and be sure to contact your doctor if: 
· You have a fever, redness, or swelling after you get a shot of medicine in the cyst. 
· You see or feel a new lump on your skin. Where can you learn more? Go to http://miah-lucy.info/ Enter V516 in the search box to learn more about \"Epidermoid Cyst: Care Instructions. \" Current as of: October 31, 2019               Content Version: 12.5 © 5404-6146 Healthwise, [employer]. Care instructions adapted under license by [employer] (which disclaims liability or warranty for this information). If you have questions about a medical condition or this instruction, always ask your healthcare professional. [address] any warranty or liability for your use of this information.

## 2020-08-18 NOTE — PROGRESS NOTES
[facility]  General Surgery    Subjective:      HPI: Patient is a very pleasant obese-BMI 33.66 kg per metered square 42-year-old male with a past medical history remarkable for hypertension, hypercholesterolemia, hypertensive heart disease with congestive heart failure, diabetes chart patient sitting upright in bed today breakfast is at her side, coronary artery disease, cerebrovascular accident, TIA, and history of tobacco abuse. He is referred by Dr. Daniela Sigala for evaluation and management of a soft tissue mass of the back. The mass is overlying the thoracic spine and soft tissue. It is tender to touch. He states that he has pain which shoots to both sides of his abdomens  Patient Active Problem List    Diagnosis Date Noted    Advance care planning 09/15/2016    Severe obstructive sleep apnea 02/10/2015    Carpal tunnel syndrome on both sides 01/16/2015    Chest pain, musculoskeletal     Obesity (BMI 30-39. 9)     H/O tobacco use, presenting hazards to health     Cerebrovascular disease     Dyslipidemia     CAD S/P percutaneous coronary angioplasty     S/P CABG x 4 01/16/2014     Past Medical History:   Diagnosis Date    BPPV (benign paroxysmal positional vertigo) 5/26/2010    CABG x 4     CAD (coronary artery disease), native coronary artery     cath (jan 2014): 40% LM, pLAD 85%, dLAD 70%, dLCx 80%, mRCA 95%. s/p CABG x 4 (LIMA->D, SVG->dLAD, Seq SVG -> RPDA & OM2)    Cardiac catheterization 01/10/2014    pLM 40%. pLAD 85%. dCX 80%. mRCA 95%. CABG recommended.  Cardiac echocardiogram 09/03/2014    Tech difficult. EF 40-45%. Mild, diffuse hypk. Mild LVH. Indeterminate diastolic fx. Mild LAE. No significant valvular heart disease.  Carotid duplex 01/13/2014    Mod 50-69% VALENTIN stenosis. Mild 4-71% LICA stenosis.   Occluded right vertebral.       Carpal tunnel syndrome     Cerebrovascular disease     Moderate VALENTIN, mild LICA, occluded R vertebral (Doppler Jan 2014)  Chest pain, musculoskeletal     post CABG    Diabetes [facility]     Dyslipidemia     H/O tobacco use, presenting hazards to health     quit     Headache(784.0)     cluster    Heart attack ([facility]     Hiatal hernia     Hypercholesterolemia     Hypertensive heart disease with congestive heart failure ([facility]     Internal carotid artery stenosis 2010    Obesity (BMI 30-39. 9)     S/P colonoscopic polypectomy 2017    tubular adenoma, f/u 5 years    Sleep apnea     does not use cpap machine    Stroke ([facility] 09    TIA (no residuals)    Systolic HF (heart failure) (HCC)     LV EF 35% (echo 2014). Mean LA pressure 32 (2014)    Vertigo       Past Surgical History:   Procedure Laterality Date    COLONOSCOPY N/A 2017    COLONOSCOPY with polypectomy performed by Mckinley Melton MD at [facility]  14    LIMA to the D1, and GSV to the distal LAD, and anotherGSV sequentially to the PDA of the RCA and to theOM2 and ascending aortic endarterectomy, Dr. Aditya Warren, 14.     HX CORONARY STENT PLACEMENT      HX UROLOGICAL      repair of spermatocele      Family History   Problem Relation Age of Onset    Cancer Father         prostate    Heart Disease Father     Heart Disease Brother         age 48   [facility] Other Son         Brain damage    Alcohol abuse Mother         dementia    Heart Attack Brother       Social History     Tobacco Use    Smoking status: Former Smoker     Packs/day: 0.25     Years: 10.00     Pack years: 2.50     Types: Cigarettes     Last attempt to quit: 2014     Years since quittin.6    Smokeless tobacco: Never Used    Tobacco comment:  less then 5 ciggerates a week   Substance Use Topics    Alcohol use: Yes     Frequency: Monthly or less     Drinks per session: 1 or 2     Binge frequency: Never     Comment: beer occasionally      Allergies   Allergen Reactions    Atorvastatin Myalgia     High dose    Peanut Diarrhea and Nausea and Vomiting    Acetaminophen Other (comments)     hallucinations    Dimethicone Rash    Percocet [Oxycodone-Acetaminophen] Other (comments)     hallucinations       Prior to Admission medications    Medication Sig Start Date End Date Taking? Authorizing Provider   clopidogreL (PLAVIX) 75 mg tab Take 1 tablet by mouth once daily 8/6/20  Yes Vineet MA NP   glyBURIDE (DIABETA) 2.5 mg tablet TAKE 1 TABLET BY MOUTH ONCE DAILY BEFORE BREAKFAST 7/21/20  Yes Luis Mcdowell MD   carvediloL (COREG) 25 mg tablet TAKE 1 TABLET BY MOUTH TWICE DAILY WITH MEALS 4/15/20  Yes Vineet MA NP   potassium chloride SR (KLOR-CON 10) 10 mEq tablet TAKE 1 TABLET BY MOUTH ONCE DAILY WITH  LASIX 1/20/20  Yes Guy Ham MD   rosuvastatin (CRESTOR) 20 mg tablet TAKE 1 TABLET BY MOUTH ONCE DAILY 10/14/19  Yes Radha Berger MD   lisinopril (PRINIVIL, ZESTRIL) 5 mg tablet TAKE 1 TABLET BY MOUTH ONCE DAILY 9/16/19  Yes Radha eBrger MD   furosemide (LASIX) 40 mg tablet TAKE 1 TABLET BY MOUTH ONCE DAILY 8/29/19  Yes Vineet Miller NP   potassium chloride (KLOR-CON M10) 10 mEq tablet TAKE ONE TABLET BY MOUTH ONCE DAILY WITH  LASIX 11/28/18  Yes Guy Ham MD   aspirin delayed-release 81 mg tablet Take 81 mg by mouth daily. Yes Provider, Historical   nitroglycerin (NITROSTAT) 0.4 mg SL tablet 1 Tab by SubLINGual route every five (5) minutes as needed for Chest Pain. 9/15/16  Yes Luis Mcdowell MD   cpap machine kit by Does Not Apply route. Overnight CPAP at 16 CWP with ramp and humidifier. Mask: Simplus FF small or mask of choice. Supplies 99 month. Please send compliance data [id number]. Diagnosis DAYANA. AHI 52 RDI 52 4/16/15  Yes Anton Lo MD       Review of Systems:    14 systems were reviewed. The results are as above in the HPI and otherwise negative.      Objective:     Vitals:    08/18/20 0922   BP: 123/76   Pulse: 74   Resp: 18   Temp: 97.5 °F (36.4 °C)   SpO2: 99%   Weight: 86.2 kg (190 lb)   Height: 5' 3\" (1.6 m)       Physical Exam:  GENERAL: alert, cooperative, no distress, appears stated age,   EYE: conjunctivae/corneas clear. PERRL, EOM's intact. THROAT & NECK: normal and no erythema or exudates noted. ,    LYMPHATIC: Cervical, supraclavicular, and axillary nodes normal. ,   LUNG: clear to auscultation bilaterally,   HEART: regular rate and rhythm, S1, S2 normal, no murmur, click, rub or gallop,   ABDOMEN: soft, non-tender. Bowel sounds normal. No masses,  no organomegaly,   EXTREMITIES:  extremities normal, atraumatic, no cyanosis or edema,   SKIN: 1.75 cm x 2.5 cm mass in the skin and soft tissue overlying the thoracic spine  NEUROLOGIC: AOx3. Cranial nerves 2-12 and sensation grossly intact. ,     Data Review:  to be done    Mr. Halie Castillo has a reminder for a \"due or due soon\" health maintenance. I have asked that he contact his primary care provider for follow-up on this health maintenance. Impression:     · Patient with a epidermoid cystic mass of the soft tissues of the back.     Plan:     · Excisional biopsy of soft tissue mass of the back  · Consent on chart  · Preoperative orders written    Signed By: Ramana Carrasquillo MD     August 18, 2020

## 2020-08-19 ENCOUNTER — HOSPITAL ENCOUNTER (OUTPATIENT)
Dept: PREADMISSION TESTING | Age: 65
Discharge: HOME | End: 2020-08-19
Payer: MEDICARE

## 2020-08-19 DIAGNOSIS — Z01.818 PREOPERATIVE TESTING: ICD-10-CM

## 2020-08-19 PROCEDURE — 87635 SARS-COV-2 COVID-19 AMP PRB: CPT

## 2020-08-20 LAB — SARS-COV-2, COV2NT: NOT DETECTED

## 2020-08-21 ENCOUNTER — ANESTHESIA EVENT (OUTPATIENT)
Dept: SURGERY | Age: 65
End: 2020-08-21
Payer: MEDICARE

## 2020-08-21 RX ORDER — SODIUM CHLORIDE 0.9 % (FLUSH) 0.9 %
5-40 SYRINGE (ML) INJECTION EVERY 8 HOURS
Status: CANCELLED | OUTPATIENT
Start: 2020-08-21

## 2020-08-21 RX ORDER — SODIUM CHLORIDE 0.9 % (FLUSH) 0.9 %
5-40 SYRINGE (ML) INJECTION AS NEEDED
Status: CANCELLED | OUTPATIENT
Start: 2020-08-21

## 2020-08-24 ENCOUNTER — ANESTHESIA (OUTPATIENT)
Dept: SURGERY | Age: 65
End: 2020-08-24
Payer: MEDICARE

## 2020-08-24 ENCOUNTER — HOSPITAL ENCOUNTER (OUTPATIENT)
Age: 65
Discharge: HOME | End: 2020-08-24
Attending: SURGERY | Admitting: SURGERY
Payer: MEDICARE

## 2020-08-24 VITALS
BODY MASS INDEX: 33.31 KG/M2 | RESPIRATION RATE: 20 BRPM | TEMPERATURE: 96.8 F | HEART RATE: 65 BPM | HEIGHT: 63 IN | OXYGEN SATURATION: 94 % | WEIGHT: 188 LBS | SYSTOLIC BLOOD PRESSURE: 96 MMHG | DIASTOLIC BLOOD PRESSURE: 86 MMHG

## 2020-08-24 DIAGNOSIS — G89.18 POSTOPERATIVE PAIN: Primary | ICD-10-CM

## 2020-08-24 PROBLEM — L72.3 INFLAMED SEBACEOUS CYST: Status: ACTIVE | Noted: 2020-08-24

## 2020-08-24 LAB
GLUCOSE BLD STRIP.AUTO-MCNC: 100 MG/DL (ref 70–110)
GLUCOSE BLD STRIP.AUTO-MCNC: 113 MG/DL (ref 70–110)

## 2020-08-24 PROCEDURE — 74011000258 HC RX REV CODE- 258: Performed by: NURSE ANESTHETIST, CERTIFIED REGISTERED

## 2020-08-24 PROCEDURE — 74011000250 HC RX REV CODE- 250: Performed by: NURSE ANESTHETIST, CERTIFIED REGISTERED

## 2020-08-24 PROCEDURE — 77030002916 HC SUT ETHLN J&J -A: Performed by: SURGERY

## 2020-08-24 PROCEDURE — 77030026438 HC STYL ET INTUB CARD -A: Performed by: ANESTHESIOLOGY

## 2020-08-24 PROCEDURE — 74011250636 HC RX REV CODE- 250/636: Performed by: NURSE ANESTHETIST, CERTIFIED REGISTERED

## 2020-08-24 PROCEDURE — 76210000006 HC OR PH I REC 0.5 TO 1 HR: Performed by: SURGERY

## 2020-08-24 PROCEDURE — 74011000250 HC RX REV CODE- 250: Performed by: SURGERY

## 2020-08-24 PROCEDURE — 88305 TISSUE EXAM BY PATHOLOGIST: CPT

## 2020-08-24 PROCEDURE — 74011000250 HC RX REV CODE- 250

## 2020-08-24 PROCEDURE — 76010000138 HC OR TIME 0.5 TO 1 HR: Performed by: SURGERY

## 2020-08-24 PROCEDURE — 77030040361 HC SLV COMPR DVT MDII -B: Performed by: SURGERY

## 2020-08-24 PROCEDURE — 77030002986 HC SUT PROL J&J -A: Performed by: SURGERY

## 2020-08-24 PROCEDURE — 77030040922 HC BLNKT HYPOTHRM STRY -A: Performed by: SURGERY

## 2020-08-24 PROCEDURE — 76210000026 HC REC RM PH II 1 TO 1.5 HR: Performed by: SURGERY

## 2020-08-24 PROCEDURE — 74011250636 HC RX REV CODE- 250/636

## 2020-08-24 PROCEDURE — 88304 TISSUE EXAM BY PATHOLOGIST: CPT

## 2020-08-24 PROCEDURE — 76060000032 HC ANESTHESIA 0.5 TO 1 HR: Performed by: SURGERY

## 2020-08-24 PROCEDURE — 77030008683 HC TU ET CUF COVD -A: Performed by: ANESTHESIOLOGY

## 2020-08-24 PROCEDURE — 74011250636 HC RX REV CODE- 250/636: Performed by: SURGERY

## 2020-08-24 PROCEDURE — 74011250637 HC RX REV CODE- 250/637: Performed by: NURSE ANESTHETIST, CERTIFIED REGISTERED

## 2020-08-24 PROCEDURE — 82962 GLUCOSE BLOOD TEST: CPT

## 2020-08-24 PROCEDURE — 77030031139 HC SUT VCRL2 J&J -A: Performed by: SURGERY

## 2020-08-24 RX ORDER — EPHEDRINE SULFATE/0.9% NACL/PF 25 MG/5 ML
SYRINGE (ML) INTRAVENOUS AS NEEDED
Status: DISCONTINUED | OUTPATIENT
Start: 2020-08-24 | End: 2020-08-24 | Stop reason: HOSPADM

## 2020-08-24 RX ORDER — MIDAZOLAM HYDROCHLORIDE 1 MG/ML
INJECTION, SOLUTION INTRAMUSCULAR; INTRAVENOUS AS NEEDED
Status: DISCONTINUED | OUTPATIENT
Start: 2020-08-24 | End: 2020-08-24 | Stop reason: HOSPADM

## 2020-08-24 RX ORDER — SODIUM CHLORIDE 0.9 % (FLUSH) 0.9 %
5-40 SYRINGE (ML) INJECTION AS NEEDED
Status: DISCONTINUED | OUTPATIENT
Start: 2020-08-24 | End: 2020-08-24 | Stop reason: HOSPADM

## 2020-08-24 RX ORDER — CEFAZOLIN SODIUM 2 G/50ML
2 SOLUTION INTRAVENOUS ONCE
Status: COMPLETED | OUTPATIENT
Start: 2020-08-24 | End: 2020-08-24

## 2020-08-24 RX ORDER — FENTANYL CITRATE 50 UG/ML
INJECTION, SOLUTION INTRAMUSCULAR; INTRAVENOUS AS NEEDED
Status: DISCONTINUED | OUTPATIENT
Start: 2020-08-24 | End: 2020-08-24 | Stop reason: HOSPADM

## 2020-08-24 RX ORDER — KETOROLAC TROMETHAMINE 15 MG/ML
INJECTION, SOLUTION INTRAMUSCULAR; INTRAVENOUS AS NEEDED
Status: DISCONTINUED | OUTPATIENT
Start: 2020-08-24 | End: 2020-08-24 | Stop reason: HOSPADM

## 2020-08-24 RX ORDER — SODIUM CHLORIDE 0.9 % (FLUSH) 0.9 %
5-40 SYRINGE (ML) INJECTION EVERY 8 HOURS
Status: DISCONTINUED | OUTPATIENT
Start: 2020-08-24 | End: 2020-08-24 | Stop reason: HOSPADM

## 2020-08-24 RX ORDER — FENTANYL CITRATE 50 UG/ML
50 INJECTION, SOLUTION INTRAMUSCULAR; INTRAVENOUS
Status: DISCONTINUED | OUTPATIENT
Start: 2020-08-24 | End: 2020-08-24 | Stop reason: HOSPADM

## 2020-08-24 RX ORDER — FAMOTIDINE 20 MG/1
20 TABLET, FILM COATED ORAL ONCE
Status: COMPLETED | OUTPATIENT
Start: 2020-08-24 | End: 2020-08-24

## 2020-08-24 RX ORDER — ONDANSETRON 2 MG/ML
INJECTION INTRAMUSCULAR; INTRAVENOUS AS NEEDED
Status: DISCONTINUED | OUTPATIENT
Start: 2020-08-24 | End: 2020-08-24 | Stop reason: HOSPADM

## 2020-08-24 RX ORDER — GLYCOPYRROLATE 0.2 MG/ML
INJECTION INTRAMUSCULAR; INTRAVENOUS AS NEEDED
Status: DISCONTINUED | OUTPATIENT
Start: 2020-08-24 | End: 2020-08-24 | Stop reason: HOSPADM

## 2020-08-24 RX ORDER — NALOXONE HYDROCHLORIDE 0.4 MG/ML
0.2 INJECTION, SOLUTION INTRAMUSCULAR; INTRAVENOUS; SUBCUTANEOUS AS NEEDED
Status: DISCONTINUED | OUTPATIENT
Start: 2020-08-24 | End: 2020-08-24 | Stop reason: HOSPADM

## 2020-08-24 RX ORDER — DOCUSATE SODIUM 100 MG/1
100 CAPSULE, LIQUID FILLED ORAL 2 TIMES DAILY
Qty: 60 CAP | Refills: 2 | Status: SHIPPED | OUTPATIENT
Start: 2020-08-24 | End: 2020-09-14 | Stop reason: ALTCHOICE

## 2020-08-24 RX ORDER — MAGNESIUM SULFATE 100 %
4 CRYSTALS MISCELLANEOUS AS NEEDED
Status: DISCONTINUED | OUTPATIENT
Start: 2020-08-24 | End: 2020-08-24 | Stop reason: HOSPADM

## 2020-08-24 RX ORDER — SUCCINYLCHOLINE CHLORIDE 20 MG/ML
INJECTION INTRAMUSCULAR; INTRAVENOUS AS NEEDED
Status: DISCONTINUED | OUTPATIENT
Start: 2020-08-24 | End: 2020-08-24 | Stop reason: HOSPADM

## 2020-08-24 RX ORDER — INSULIN LISPRO 100 [IU]/ML
INJECTION, SOLUTION INTRAVENOUS; SUBCUTANEOUS ONCE
Status: DISCONTINUED | OUTPATIENT
Start: 2020-08-24 | End: 2020-08-24 | Stop reason: HOSPADM

## 2020-08-24 RX ORDER — PROPOFOL 10 MG/ML
INJECTION, EMULSION INTRAVENOUS AS NEEDED
Status: DISCONTINUED | OUTPATIENT
Start: 2020-08-24 | End: 2020-08-24 | Stop reason: HOSPADM

## 2020-08-24 RX ORDER — DEXTROSE 50 % IN WATER (D50W) INTRAVENOUS SYRINGE
25-50 AS NEEDED
Status: DISCONTINUED | OUTPATIENT
Start: 2020-08-24 | End: 2020-08-24 | Stop reason: HOSPADM

## 2020-08-24 RX ORDER — ONDANSETRON 2 MG/ML
4 INJECTION INTRAMUSCULAR; INTRAVENOUS ONCE
Status: DISCONTINUED | OUTPATIENT
Start: 2020-08-24 | End: 2020-08-24 | Stop reason: HOSPADM

## 2020-08-24 RX ORDER — SODIUM CHLORIDE, SODIUM LACTATE, POTASSIUM CHLORIDE, CALCIUM CHLORIDE 600; 310; 30; 20 MG/100ML; MG/100ML; MG/100ML; MG/100ML
75 INJECTION, SOLUTION INTRAVENOUS CONTINUOUS
Status: DISCONTINUED | OUTPATIENT
Start: 2020-08-24 | End: 2020-08-24 | Stop reason: HOSPADM

## 2020-08-24 RX ORDER — BUPIVACAINE HYDROCHLORIDE AND EPINEPHRINE 5; 5 MG/ML; UG/ML
INJECTION, SOLUTION EPIDURAL; INTRACAUDAL; PERINEURAL AS NEEDED
Status: DISCONTINUED | OUTPATIENT
Start: 2020-08-24 | End: 2020-08-24 | Stop reason: HOSPADM

## 2020-08-24 RX ORDER — TRAMADOL HYDROCHLORIDE 50 MG/1
100 TABLET ORAL
Qty: 20 TAB | Refills: 0 | Status: SHIPPED | OUTPATIENT
Start: 2020-08-24 | End: 2020-08-27

## 2020-08-24 RX ORDER — LIDOCAINE HYDROCHLORIDE 20 MG/ML
INJECTION, SOLUTION EPIDURAL; INFILTRATION; INTRACAUDAL; PERINEURAL AS NEEDED
Status: DISCONTINUED | OUTPATIENT
Start: 2020-08-24 | End: 2020-08-24 | Stop reason: HOSPADM

## 2020-08-24 RX ORDER — GLYBURIDE 2.5 MG/1
TABLET ORAL
Qty: 30 TAB | Refills: 0 | OUTPATIENT
Start: 2020-08-24

## 2020-08-24 RX ADMIN — MIDAZOLAM HYDROCHLORIDE 2 MG: 2 INJECTION, SOLUTION INTRAMUSCULAR; INTRAVENOUS at 12:18

## 2020-08-24 RX ADMIN — PROPOFOL 120 MG: 10 INJECTION, EMULSION INTRAVENOUS at 12:29

## 2020-08-24 RX ADMIN — ONDANSETRON 4 MG: 2 INJECTION INTRAMUSCULAR; INTRAVENOUS at 12:18

## 2020-08-24 RX ADMIN — SODIUM CHLORIDE, SODIUM LACTATE, POTASSIUM CHLORIDE, AND CALCIUM CHLORIDE 75 ML/HR: 600; 310; 30; 20 INJECTION, SOLUTION INTRAVENOUS at 11:58

## 2020-08-24 RX ADMIN — FENTANYL CITRATE 50 MCG: 50 INJECTION, SOLUTION INTRAMUSCULAR; INTRAVENOUS at 12:27

## 2020-08-24 RX ADMIN — PROPOFOL 40 MG: 10 INJECTION, EMULSION INTRAVENOUS at 12:57

## 2020-08-24 RX ADMIN — SUCCINYLCHOLINE CHLORIDE 100 MG: 20 INJECTION, SOLUTION INTRAMUSCULAR; INTRAVENOUS at 12:29

## 2020-08-24 RX ADMIN — LIDOCAINE HYDROCHLORIDE 60 MG: 20 INJECTION, SOLUTION EPIDURAL; INFILTRATION; INTRACAUDAL; PERINEURAL at 12:29

## 2020-08-24 RX ADMIN — Medication 10 MG: at 12:38

## 2020-08-24 RX ADMIN — LIDOCAINE HYDROCHLORIDE 20 MG: 20 INJECTION, SOLUTION EPIDURAL; INFILTRATION; INTRACAUDAL; PERINEURAL at 12:57

## 2020-08-24 RX ADMIN — GLYCOPYRROLATE 0.2 MG: 0.2 INJECTION INTRAMUSCULAR; INTRAVENOUS at 12:18

## 2020-08-24 RX ADMIN — DEXMEDETOMIDINE HYDROCHLORIDE 10 MCG: 100 INJECTION, SOLUTION, CONCENTRATE INTRAVENOUS at 12:27

## 2020-08-24 RX ADMIN — FAMOTIDINE 20 MG: 20 TABLET, FILM COATED ORAL at 11:59

## 2020-08-24 RX ADMIN — CEFAZOLIN SODIUM 2 G: 2 SOLUTION INTRAVENOUS at 12:18

## 2020-08-24 RX ADMIN — KETOROLAC TROMETHAMINE 15 MG: 15 INJECTION, SOLUTION INTRAMUSCULAR; INTRAVENOUS at 12:49

## 2020-08-24 RX ADMIN — FENTANYL CITRATE 50 MCG: 50 INJECTION, SOLUTION INTRAMUSCULAR; INTRAVENOUS at 13:12

## 2020-08-24 NOTE — ANESTHESIA PREPROCEDURE EVALUATION
Relevant Problems   No relevant active problems       Anesthetic History   No history of anesthetic complications            Review of Systems / Medical History  Patient summary reviewed and pertinent labs reviewed    Pulmonary        Sleep apnea: No treatment  Smoker         Neuro/Psych         TIA     Cardiovascular    Hypertension: well controlled          Past MI, CAD, cardiac stents and CABG         GI/Hepatic/Renal                Endo/Other    Diabetes    Arthritis     Other Findings              Physical Exam    Airway    TM Distance: 4 - 6 cm  Neck ROM: normal range of motion   Mouth opening: Normal     Cardiovascular    Rhythm: regular  Rate: normal         Dental    Dentition: Edentulous     Pulmonary                Comments: Non labored Abdominal  GI exam deferred       Other Findings            Anesthetic Plan    ASA: 3  Anesthesia type: general          Induction: Intravenous  Anesthetic plan and risks discussed with: Patient

## 2020-08-24 NOTE — ANESTHESIA POSTPROCEDURE EVALUATION
Procedure(s):  EXCISIONAL BIOPSY OF SOFT TISSUE MASS FROM THE BACK/RIGHT LATERAL DECUBITUS POSITION. general    Anesthesia Post Evaluation      Multimodal analgesia: multimodal analgesia used between 6 hours prior to anesthesia start to PACU discharge  Patient location during evaluation: PACU  Patient participation: complete - patient participated  Level of consciousness: awake  Pain management: adequate  Airway patency: patent  Anesthetic complications: no  Cardiovascular status: acceptable  Respiratory status: acceptable  Hydration status: acceptable  Post anesthesia nausea and vomiting:  controlled      INITIAL Post-op Vital signs:   Vitals Value Taken Time   /57 8/24/2020  2:12 PM   Temp 36.6 °C (97.8 °F) 8/24/2020  1:22 PM   Pulse 63 8/24/2020  2:21 PM   Resp 17 8/24/2020  2:21 PM   SpO2 94 % 8/24/2020  2:21 PM   Vitals shown include unvalidated device data.

## 2020-08-24 NOTE — INTERVAL H&P NOTE
Update History & Physical 
 
The Patient's History and Physical of August 18, 2020 was reviewed with the patient and I examined the patient. There was no change. The surgical site was confirmed by the patient and me. Plan:  The risk, benefits, expected outcome, and alternative to the recommended procedure have been discussed with the patient. Patient understands and wants to proceed with the procedure.  
 
Electronically signed by Jovita Isbell MD on 8/24/2020 at 11:43 AM

## 2020-08-24 NOTE — OP NOTES
[place] [address] [address] [facility]                                 OPERATIVE REPORT    PATIENT:    Chinyere Piña  MRN:           [id number]   DATE:   2020  BILLIN  ROOM:       /  ATTENDING:   Holly Pineda MD  DICTATING:   Holly Pineda MD      Preoperative Dx: soft tissue mass of the back     Postoperative Dx: Same     Procedure: Excisional biopsy of soft tissue mass of the back    Surgeon:  Lexi Moreno MD    Assistant:  Jeff Warner SA    Anesthesia:  General and Local -  .25% Marcaine with epinephrine    Findings:   soft tissue mass of the back    Specimen:  soft tissue mass of the back    EBL:10 mL    Fluid: 507 mL    Complications:  None    Implants:  None    Brief Operative Indication:   soft tissue mass of the back    Description of operation :  The patient was identified in the preoperative area. Informed consent was obtained from the patient. The patient was positioned left lateral decubitus position on the table. The skin was prepped with Chlorhexadine and sterile drape applied. Time out was performed. Briefing was performed. The local anesthetic was infiltrated into the skin and subcutaneous tissues. The #10 blade was used to create an incision 5 cm L  X 2 cm W to excise the 1.5 cm L X 1.5 cm W mass with a narrowest margin of 0.25 cm. The electrocautery used to completely excise the cystic lesion. The deepest layer of dissection was the musculature of the back. The wound was cleaned with sterile saline. The deep dermal tissues were re approximated using 3-0 Vicryl suture with interrupted simple stitches. The skin was re approximated using 2-0 nylon suture in an interrupted vertical mattress closure. Sterile dressings were applied. Larry Churchill He tolerated the procedure well. Disposition: He was stable transport to the recovery.

## 2020-08-24 NOTE — DISCHARGE INSTRUCTIONS
[place] [address] [facility]  Felicia Long MD, FACS  General Surgery    Pt may remove the dressing and shower in two days. Allow soap and water to run over the incision. No driving or operating heavy machinery while on narcotic pain medications. Please apply an ice pack to the operative site for 30 minutes 3 times daily to help reduce pain and swelling and the need for narcotic pain medication. No strenuous activity or contact sports for two weeks. No lifting greater than 15 lbs for 2 weeks. Call MD for any redness, swelling, bleeding or pus at the incision. Also call for any nausea, vomiting, increased pain or pain uncontrolled by pain medicine. Patient Education        Epidermoid Cyst: Care Instructions  Your Care Instructions  An epidermoid (say \"qn-ptu-EZI-adriana\") cyst is a lump just under the skin. These cysts can form when a hair follicle becomes blocked. They are common in acne and may occur on the face, neck, back, and genitals. However, they can form anywhere on the body. These cysts are not cancer and do not lead to cancer. They tend not to hurt, but they can sometimes become swollen and painful. They also may break open (rupture) and cause scarring. These cysts sometimes do not cause problems and may not need treatment. If you have a cyst that is swollen and hurts, your doctor may inject it with a medicine to help it heal. But it is more likely that a painful cyst will need to be removed. Your doctor will give you a shot of numbing medicine and cut into the cyst to drain it or remove it. This makes the symptoms go away. Follow-up care is a key part of your treatment and safety. Be sure to make and go to all appointments, and call your doctor if you are having problems. It's also a good idea to know your test results and keep a list of the medicines you take. How can you care for yourself at home? · Do not squeeze the cyst or poke it with a needle to open it.  This can cause swelling, redness, and infection. · Always have a doctor look at any new lumps you get to make sure that they are not serious. When should you call for help? Watch closely for changes in your health, and be sure to contact your doctor if:  · You have a fever, redness, or swelling after you get a shot of medicine in the cyst.  · You see or feel a new lump on your skin. Where can you learn more? Go to http://miah-lucy.info/  Enter H956 in the search box to learn more about \"Epidermoid Cyst: Care Instructions. \"  Current as of: October 31, 2019               Content Version: 12.5  © 0175-3922 Healthwise, [employer]. Care instructions adapted under license by [employer] (which disclaims liability or warranty for this information). If you have questions about a medical condition or this instruction, always ask your healthcare professional. [address] any warranty or liability for your use of this information.

## 2020-08-24 NOTE — PERIOP NOTES
Patient noticed three areas of bruising/petechia to the cheekbone and jawline, where a small piece of tape (approx 1\") was still stuck from OR. I removed the tape, but did not note any active wounds/bleeding. Explained to patient the marks could be resulted from positioning in the OR or from the ET tube securement device. Pt denies pain.

## 2020-08-24 NOTE — DISCHARGE SUMMARY
[facility]  Akilah Castillo MD, [facility]  General Surgery  Discharge Summary     Patient ID:  Rico Moon  [id number]  89 y.o.  1955    Admit Date: 8/24/2020    Discharge Date: 8/24/2020    Admission Diagnoses: Inflamed sebaceous cyst [L72.3]    Discharge Diagnoses:    Problem List as of 8/24/2020 Date Reviewed: 8/18/2020          Codes Class Noted - Resolved    Inflamed sebaceous cyst ICD-10-CM: L72.3  ICD-9-CM: 706.2  8/24/2020 - Present        Advance care planning ICD-10-CM: Z71.89  ICD-9-CM: V65.49  9/15/2016 - Present    Overview Signed 9/15/2016 10:28 AM by Luis Mcdowell MD     Paperwork provided for review             Severe obstructive sleep apnea ICD-10-CM: G47.33  ICD-9-CM: 327.23  2/10/2015 - Present        Carpal tunnel syndrome on both sides ICD-10-CM: G56.03  ICD-9-CM: 354.0  1/16/2015 - Present        Chest pain, musculoskeletal ICD-10-CM: R07.89  ICD-9-CM: 786.59  Unknown - Present    Overview Signed 11/26/2014 11:14 AM by Julio Islas     post CABG             Obesity (BMI 30-39. 9) ICD-10-CM: E66.9  ICD-9-CM: 278.00  Unknown - Present        H/O tobacco use, presenting hazards to health ICD-10-CM: Z87.891  ICD-9-CM: V15.82  Unknown - Present    Overview Signed 11/26/2014 11:16 AM by Julio Swanson     quit 2014             Cerebrovascular disease ICD-10-CM: I67.9  ICD-9-CM: 437.9  Unknown - Present    Overview Signed 2/12/2014  5:32 PM by Julio Islas     Moderate VALENTIN, mild LICA, occluded R vertebral (Doppler Jan 2014)             Dyslipidemia ICD-10-CM: E78.5  ICD-9-CM: 272.4  Unknown - Present        CAD S/P percutaneous coronary angioplasty ICD-10-CM: I25.10, Z98.61  ICD-9-CM: 414.01, V45.82  Unknown - Present    Overview Addendum 5/2/2017  9:25 AM by Jaqueline Anguiano     cath (jan 2014): 40% LM,pLAD 85%,dLAD 70%, dLCx 80%,mRCA 95%.  s/p CABG x 4(LIMA->D, SVG->dLAD, Seq SVG -> RPDA & OM2)NSTEMI(Jan 2015): 65% LM,100% oLAD,100%pLCx, RCA diffuse up to 95%,LIMA->D OK with LAD receiving appropriate flow, Seq SVG-> RPDA &OM: 95% ostial with70% OM just after anastomosis; SVG->%. LV EF 40-45%(LV angio)S/p PCI with DONAL (Xience 4 x 18) Seq SVG->RPDA & OM. Seq SVG->RPDA & OM. S/P CABG x 4 ICD-10-CM: Z95.1  ICD-9-CM: V45.81  1/16/2014 - Present    Overview Addendum 1/16/2014  1:15 PM by CASTILLO Thorne     LIMA to the D1, and GSV to the distal LAD, and anotherGSV sequentially to the PDA of the RCA and to theOM2 and ascending aortic endarterectomy, Dr. Rashida Faria, 1/14/14. RESOLVED: Chronic systolic HF (heart failure) ([facility] [address] ICD-10-CM: I50.22  ICD-9-CM: 428.22  Unknown - 8/6/2020    Overview Addendum 2/25/2015  1:18 PM by Julio Zhou Ma     LV EF 35% (echo Jan 2014). Mean LA pressure 32 (Jan 2014)-> 40-45% (echo Sep 2014) -> LVEDP 18, LV EF 40-45% (LV angio Jan 2015)             RESOLVED: Hypertensive heart disease with congestive heart failure ([facility] [address] ICD-10-CM: I11.0  ICD-9-CM: 402.91, 428.0  Unknown - 8/6/2020        RESOLVED: Pulmonary edema ICD-10-CM: J81.1  ICD-9-CM: 514  1/10/2014 - 1/10/2014        RESOLVED: SOB (shortness of breath) ICD-10-CM: R06.02  ICD-9-CM: 786.05  1/10/2014 - 1/11/2014        RESOLVED: BPPV (benign paroxysmal positional vertigo) ICD-10-CM: H81.10  ICD-9-CM: 386.11  5/26/2010 - 1/10/2014               Admission Condition: Good    Discharge Condition: Good    Last Procedure: Procedure(s):  EXCISIONAL BIOPSY OF SOFT TISSUE MASS FROM THE BACK/RIGHT LATERAL DECUBITUS POSITION    Hospital Course:   Normal hospital course for this procedure. Consults: None    Significant Diagnostic Studies: None    Disposition: home    Patient Instructions:   Current Discharge Medication List      START taking these medications    Details   traMADoL (ULTRAM) 50 mg tablet Take 2 Tabs by mouth every six (6) hours as needed for Pain for up to 3 days. Max Daily Amount: 400 mg.  Indications: pain  Qty: 20 Tab, Refills: 0    Associated Diagnoses: Postoperative pain      docusate sodium (COLACE) 100 mg capsule Take 1 Cap by mouth two (2) times a day for 90 days. Qty: 60 Cap, Refills: 2      bisacodyL 5 mg tab Take 5 mg by mouth daily. Qty: 3 Tab, Refills: 0         CONTINUE these medications which have NOT CHANGED    Details   clopidogreL (PLAVIX) 75 mg tab Take 1 tablet by mouth once daily  Qty: 90 Tab, Refills: 3      glyBURIDE (DIABETA) 2.5 mg tablet TAKE 1 TABLET BY MOUTH ONCE DAILY BEFORE BREAKFAST  Qty: 30 Tab, Refills: 0    Comments: Patient overdue. 30 days okay. Please call patient when ready. carvediloL (COREG) 25 mg tablet TAKE 1 TABLET BY MOUTH TWICE DAILY WITH MEALS  Qty: 90 Tab, Refills: 3      potassium chloride SR (KLOR-CON 10) 10 mEq tablet TAKE 1 TABLET BY MOUTH ONCE DAILY WITH  LASIX  Qty: 90 Tab, Refills: 3      rosuvastatin (CRESTOR) 20 mg tablet TAKE 1 TABLET BY MOUTH ONCE DAILY  Qty: 90 Tab, Refills: 3    Associated Diagnoses: Dyslipidemia      lisinopril (PRINIVIL, ZESTRIL) 5 mg tablet TAKE 1 TABLET BY MOUTH ONCE DAILY  Qty: 90 Tab, Refills: 3      furosemide (LASIX) 40 mg tablet TAKE 1 TABLET BY MOUTH ONCE DAILY  Qty: 90 Tab, Refills: 3      potassium chloride (KLOR-CON M10) 10 mEq tablet TAKE ONE TABLET BY MOUTH ONCE DAILY WITH  LASIX  Qty: 90 Tab, Refills: 3      aspirin delayed-release 81 mg tablet Take 81 mg by mouth daily. nitroglycerin (NITROSTAT) 0.4 mg SL tablet 1 Tab by SubLINGual route every five (5) minutes as needed for Chest Pain. Qty: 1 Bottle, Refills: 0      cpap machine kit by Does Not Apply route. Overnight CPAP at 16 CWP with ramp and humidifier. Mask: Simplus FF small or mask of choice. Supplies 99 month. Please send compliance data [id number]. Diagnosis DAYANA. AHI 52 RDI 52  Qty: 1 Kit, Refills: 0           Activity: See surgical instructions  Diet: Low fat, Low cholesterol  Wound Care: As directed    Follow-up with Dr. Maria L Francis in 2 weeks.   Follow-up tests/labs None    Signed:  Sky Vieyra MD  8/24/2020  11:49 AM

## 2020-08-25 RX ORDER — GLYBURIDE 2.5 MG/1
TABLET ORAL
Qty: 30 TAB | Refills: 0 | OUTPATIENT
Start: 2020-08-25

## 2020-08-25 NOTE — TELEPHONE ENCOUNTER
This pharmacy faxed over request for the following prescriptions to be filled: 
 
Medication requested :  
Requested Prescriptions Pending Prescriptions Disp Refills  glyBURIDE (DIABETA) 2.5 mg tablet [Pharmacy Med Name: glyBURIDE 2.5 MG Oral Tablet] 30 Tab 0 Sig: TAKE 1 TABLET BY MOUTH ONCE DAILY BEFORE BREAKFAST  
 
PCP: [facility] or Print: [facility] Mail order or Local pharmacy [address] Scheduled appointment if not seen by current providers in office:  LOV 11/12/2019 f/u 9/14/2020

## 2020-08-27 RX ORDER — GLYBURIDE 2.5 MG/1
TABLET ORAL
Qty: 30 TAB | Refills: 0 | OUTPATIENT
Start: 2020-08-27

## 2020-09-01 RX ORDER — GLYBURIDE 2.5 MG/1
TABLET ORAL
Qty: 30 TAB | Refills: 0 | OUTPATIENT
Start: 2020-09-01

## 2020-09-09 ENCOUNTER — VIRTUAL VISIT (OUTPATIENT)
Dept: SURGERY | Age: 65
End: 2020-09-09

## 2020-09-09 DIAGNOSIS — Z09 POSTOPERATIVE EXAMINATION: Primary | ICD-10-CM

## 2020-09-09 NOTE — PROGRESS NOTES
Mahin Krueger M.D. FACS  PROGRESS NOTE        Subjective:  Patient presents today stating that he is able to sleep on his back now better than current prior to the surgery. He denies any fever or chills. Objective: There were no vitals filed for this visit. Physical Exam:    General: in no apparent distress    Skin: Incision in the upper center of the back with sutures intact. The sutures were removed. The incision remained well approximated. A dressing was placed. Current Medications:  Current Outpatient Medications   Medication Sig Dispense Refill    docusate sodium (COLACE) 100 mg capsule Take 1 Cap by mouth two (2) times a day for 90 days. 60 Cap 2    bisacodyL 5 mg tab Take 5 mg by mouth daily. 3 Tab 0    clopidogreL (PLAVIX) 75 mg tab Take 1 tablet by mouth once daily 90 Tab 3    glyBURIDE (DIABETA) 2.5 mg tablet TAKE 1 TABLET BY MOUTH ONCE DAILY BEFORE BREAKFAST 30 Tab 0    carvediloL (COREG) 25 mg tablet TAKE 1 TABLET BY MOUTH TWICE DAILY WITH MEALS 90 Tab 3    potassium chloride SR (KLOR-CON 10) 10 mEq tablet TAKE 1 TABLET BY MOUTH ONCE DAILY WITH  LASIX 90 Tab 3    rosuvastatin (CRESTOR) 20 mg tablet TAKE 1 TABLET BY MOUTH ONCE DAILY 90 Tab 3    lisinopril (PRINIVIL, ZESTRIL) 5 mg tablet TAKE 1 TABLET BY MOUTH ONCE DAILY 90 Tab 3    furosemide (LASIX) 40 mg tablet TAKE 1 TABLET BY MOUTH ONCE DAILY 90 Tab 3    potassium chloride (KLOR-CON M10) 10 mEq tablet TAKE ONE TABLET BY MOUTH ONCE DAILY WITH  LASIX 90 Tab 3    aspirin delayed-release 81 mg tablet Take 81 mg by mouth daily.  nitroglycerin (NITROSTAT) 0.4 mg SL tablet 1 Tab by SubLINGual route every five (5) minutes as needed for Chest Pain. 1 Bottle 0    cpap machine kit by Does Not Apply route. Overnight CPAP at 16 CWP with ramp and humidifier. Mask: Simplus FF small or mask of choice. Supplies 99 month. Please send compliance data [id number]. Diagnosis DAYANA. AHI 52 RDI 52 1 Kit 0       Chart and notes reviewed.  Data reviewed. I have evaluated and examined the patient. Impression and Plan:  Patient doing well 2 weeks out from excisional biopsy of epidermal inclusion cyst from the back. Patient may remove the dressing was placed today on tonight or tomorrow when he gets ready to shower. He should not have to put another dressing. We did discuss that he is okay and cleared to go play golf.   ROB Sherwood MD

## 2020-09-09 NOTE — PROGRESS NOTES
Chinyere Piña is a 59 y.o. male  Chief Complaint   Patient presents with    Follow-up     8/24/20 excisional biopsy of soft tissue mass of the back     HPI: Chinyere Piña is a 59 y.o. male presenting for suture removal of back biopsy    Past Medical History:   Diagnosis Date    BPPV (benign paroxysmal positional vertigo) 05/26/2010    CABG x 4     CAD (coronary artery disease), native coronary artery     cath (jan 2014): 40% LM, pLAD 85%, dLAD 70%, dLCx 80%, mRCA 95%. s/p CABG x 4 (LIMA->D, SVG->dLAD, Seq SVG -> RPDA & OM2)    Cardiac catheterization 01/10/2014    pLM 40%. pLAD 85%. dCX 80%. mRCA 95%. CABG recommended.  Cardiac echocardiogram 09/03/2014    Tech difficult. EF 40-45%. Mild, diffuse hypk. Mild LVH. Indeterminate diastolic fx. Mild LAE. No significant valvular heart disease.  Carotid duplex 01/13/2014    Mod 50-69% VALENTIN stenosis. Mild 7-93% LICA stenosis. Occluded right vertebral.       Carpal tunnel syndrome     Cerebrovascular disease     Moderate VALENTIN, mild LICA, occluded R vertebral (Doppler Jan 2014)    Chest pain, musculoskeletal     post CABG    Diabetes ([address]     Dyslipidemia     H/O tobacco use, presenting hazards to health     quit 2014    Headache(784.0)     cluster    Heart attack ([address] 2014    Heart attack ([address] 2015    Hiatal hernia     Hypercholesterolemia     Hypertensive heart disease with congestive heart failure ([address]     Internal carotid artery stenosis 5/26/2010    Obesity (BMI 30-39. 9)     S/P colonoscopic polypectomy 09/2017    tubular adenoma, f/u 5 years    Sleep apnea     does not use cpap machine    Stroke ([address] 5/22/09    TIA (no residuals)    Systolic HF (heart failure) (HCC)     LV EF 35% (echo Jan 2014).  Mean LA pressure 32 (Jan 2014)    Vertigo        Past Surgical History:   Procedure Laterality Date    COLONOSCOPY N/A 9/7/2017    COLONOSCOPY with polypectomy performed by Lisha Chavez MD at [address] [place] [address] CORONARY ARTERY BYPASS GRAFT  14    LIMA to the D1, and GSV to the distal LAD, and anotherGSV sequentially to the PDA of the RCA and to theOM2 and ascending aortic endarterectomy, Dr. Rashida Faria, 14.  HX CORONARY STENT PLACEMENT      HX UROLOGICAL      repair of spermatocele       Family History   Problem Relation Age of Onset    Cancer Father         prostate    Heart Disease Father     Heart Disease Brother         age 48   Governor Rosina Other Son         Brain damage    Alcohol abuse Mother         dementia    Heart Attack Brother        Social History     Socioeconomic History    Marital status:      Spouse name: Not on file    Number of children: Not on file    Years of education: Not on file    Highest education level: Not on file   Occupational History    Occupation: retired   Tobacco Use    Smoking status: Current Some Day Smoker     Packs/day: 0.25     Years: 10.00     Pack years: 2.50     Types: Cigarettes     Last attempt to quit: 2014     Years since quittin.6    Smokeless tobacco: Never Used    Tobacco comment:  less then 5 cig a week   Substance and Sexual Activity    Alcohol use: Yes     Frequency: Monthly or less     Drinks per session: 1 or 2     Binge frequency: Never     Comment: beer occasionally    Drug use: No    Sexual activity: Not Currently     Partners: Female         Allergies   Allergen Reactions    Atorvastatin Myalgia     High dose    Peanut Diarrhea and Nausea and Vomiting    Acetaminophen Other (comments)     hallucinations    Dimethicone Rash    Percocet [Oxycodone-Acetaminophen] Other (comments)     hallucinations       Blood pressure 117/76  pulse 76  Temperature 97.2  Weight 189lbs  Pulse oximetry 99  Respirations 18    The diagnoses and plan were discussed with the patient. All questions answered. Plan of care agreed to by all concerned.

## 2020-09-10 ENCOUNTER — PATIENT OUTREACH (OUTPATIENT)
Dept: CASE MANAGEMENT | Age: 65
End: 2020-09-10

## 2020-09-10 NOTE — PROGRESS NOTES
Patient resolved from Transition of Care episode on 9/10/20. Patient/family has been provided the following resources and education related to COVID-19:                         Signs, symptoms and red flags related to COVID-19            CDC exposure and quarantine guidelines            [facility] exposure contact - [phone]            Contact for their local Department of Health                 Patient currently reports that their sx have improved or resolved. No further outreach scheduled with this CTN/ACM. Episode of Care resolved. Patient has this CTN/ACM contact information if future needs arise.

## 2020-09-11 ENCOUNTER — TELEPHONE (OUTPATIENT)
Dept: FAMILY MEDICINE CLINIC | Age: 65
End: 2020-09-11

## 2020-09-11 NOTE — TELEPHONE ENCOUNTER
Have you been diagnosed with, tested for, or told that you are suspected of having COVID-19 (coronovirus)? Two weeks ago for surgery - Negative Have you had a fever or taken medication to treat a fever in the past 72 hours? No  
Have you had a cough, SOB, or flu-like symptoms within the past 3 days? No  
Have you had direct contact with someone who tested positive for COVID-19 within the past 14 days? No  
Do you have a household member with flu-like symptoms, including fever, cough, or SOB? No  
Do you currently have flu-like symptoms including fever, cough, or SOB?  No

## 2020-09-14 ENCOUNTER — OFFICE VISIT (OUTPATIENT)
Dept: FAMILY MEDICINE CLINIC | Age: 65
End: 2020-09-14

## 2020-09-14 VITALS
HEIGHT: 63 IN | DIASTOLIC BLOOD PRESSURE: 74 MMHG | TEMPERATURE: 98.3 F | RESPIRATION RATE: 16 BRPM | OXYGEN SATURATION: 94 % | SYSTOLIC BLOOD PRESSURE: 110 MMHG | WEIGHT: 187 LBS | BODY MASS INDEX: 33.13 KG/M2 | HEART RATE: 81 BPM

## 2020-09-14 VITALS
TEMPERATURE: 98.3 F | DIASTOLIC BLOOD PRESSURE: 74 MMHG | OXYGEN SATURATION: 94 % | RESPIRATION RATE: 16 BRPM | BODY MASS INDEX: 33.13 KG/M2 | WEIGHT: 187 LBS | SYSTOLIC BLOOD PRESSURE: 110 MMHG | HEART RATE: 81 BPM | HEIGHT: 63 IN

## 2020-09-14 DIAGNOSIS — I50.22 CHRONIC SYSTOLIC HF (HEART FAILURE) (HCC): ICD-10-CM

## 2020-09-14 DIAGNOSIS — G47.33 SEVERE OBSTRUCTIVE SLEEP APNEA: ICD-10-CM

## 2020-09-14 DIAGNOSIS — Z13.31 SCREENING FOR DEPRESSION: ICD-10-CM

## 2020-09-14 DIAGNOSIS — E66.9 OBESITY (BMI 30-39.9): ICD-10-CM

## 2020-09-14 DIAGNOSIS — E78.5 DYSLIPIDEMIA: ICD-10-CM

## 2020-09-14 DIAGNOSIS — E11.9 CONTROLLED TYPE 2 DIABETES MELLITUS WITHOUT COMPLICATION, WITHOUT LONG-TERM CURRENT USE OF INSULIN (HCC): Primary | ICD-10-CM

## 2020-09-14 DIAGNOSIS — Z71.89 ADVANCED DIRECTIVES, COUNSELING/DISCUSSION: ICD-10-CM

## 2020-09-14 DIAGNOSIS — Z95.1 S/P CABG X 4: ICD-10-CM

## 2020-09-14 DIAGNOSIS — Z00.00 MEDICARE ANNUAL WELLNESS VISIT, SUBSEQUENT: ICD-10-CM

## 2020-09-14 DIAGNOSIS — Z12.5 PROSTATE CANCER SCREENING: ICD-10-CM

## 2020-09-14 DIAGNOSIS — I11.0 HYPERTENSIVE HEART DISEASE WITH CONGESTIVE HEART FAILURE, UNSPECIFIED HEART FAILURE TYPE (HCC): ICD-10-CM

## 2020-09-14 RX ORDER — GLYBURIDE 2.5 MG/1
2.5 TABLET ORAL
Qty: 90 TAB | Refills: 1 | Status: SHIPPED | OUTPATIENT
Start: 2020-09-14 | End: 2021-01-06

## 2020-09-14 NOTE — PROGRESS NOTES
1. Have you been to the ER, urgent care clinic since your last visit? Hospitalized since your last visit? Yes 08- inflammed cyst (Dr. Foreign Malone)     2. Have you seen or consulted any other health care providers outside of the [address] [place] [address] since your last visit? Include any pap smears or colon screening. No       Have you been diagnosed with, tested for, or told that you are suspected of having COVID-19 (coronovirus)? Two weeks ago for surgery - Negative   Have you had a fever or taken medication to treat a fever in the past 72 hours? No   Have you had a cough, SOB, or flu-like symptoms within the past 3 days? No   Have you had direct contact with someone who tested positive for COVID-19 within the past 14 days? No   Do you have a household member with flu-like symptoms, including fever, cough, or SOB? No   Do you currently have flu-like symptoms including fever, cough, or SOB?  No

## 2020-09-14 NOTE — PATIENT INSTRUCTIONS
Medicare Wellness Visit, Male The best way to live healthy is to have a lifestyle where you eat a well-balanced diet, exercise regularly, limit alcohol use, and quit all forms of tobacco/nicotine, if applicable. Regular preventive services are another way to keep healthy. Preventive services (vaccines, screening tests, monitoring & exams) can help personalize your care plan, which helps you manage your own care. Screening tests can find health problems at the earliest stages, when they are easiest to treat. [facility] follows the current, evidence-based guidelines published by the [facility] Gennaro Javan ([facility]) when recommending preventive services for our patients. Because we follow these guidelines, sometimes recommendations change over time as research supports it. (For example, a prostate screening blood test is no longer routinely recommended for men with no symptoms). Of course, you and your doctor may decide to screen more often for some diseases, based on your risk and co-morbidities (chronic disease you are already diagnosed with). Preventive services for you include: - Medicare offers their members a free annual wellness visit, which is time for you and your primary care provider to discuss and plan for your preventive service needs. Take advantage of this benefit every year! 
-All adults over age 72 should receive the recommended pneumonia vaccines. Current USPSTF guidelines recommend a series of two vaccines for the best pneumonia protection.  
-All adults should have a flu vaccine yearly and tetanus vaccine every 10 years. 
-All adults age 48 and older should receive the shingles vaccines (series of two vaccines).       
-All adults age 38-68 who are overweight should have a diabetes screening test once every three years.  
-Other screening tests & preventive services for persons with diabetes include: an eye exam to screen for diabetic retinopathy, a kidney function test, a foot exam, and stricter control over your cholesterol.  
-Cardiovascular screening for adults with routine risk involves an electrocardiogram (ECG) at intervals determined by the provider.  
-Colorectal cancer screening should be done for adults age 54-65 with no increased risk factors for colorectal cancer. There are a number of acceptable methods of screening for this type of cancer. Each test has its own benefits and drawbacks. Discuss with your provider what is most appropriate for you during your annual wellness visit. The different tests include: colonoscopy (considered the best screening method), a fecal occult blood test, a fecal DNA test, and sigmoidoscopy. 
-All adults born between [facility] should be screened once for Hepatitis C. 
-An Abdominal Aortic Aneurysm (AAA) Screening is recommended for men age 73-68 who has ever smoked in their lifetime. Here is a list of your current Health Maintenance items (your personalized list of preventive services) with a due date: 
Health Maintenance Due Topic Date Due  Shingles Vaccine (1 of 2) 12/10/2005  Eye Exam  02/16/2019 Elba Ayala Annual Well Visit  03/12/2020  Yearly Flu Vaccine (1) 09/01/2020

## 2020-09-14 NOTE — PATIENT INSTRUCTIONS
A Healthy Lifestyle: Care Instructions Your Care Instructions A healthy lifestyle can help you feel good, stay at a healthy weight, and have plenty of energy for both work and play. A healthy lifestyle is something you can share with your whole family. A healthy lifestyle also can lower your risk for serious health problems, such as high blood pressure, heart disease, and diabetes. You can follow a few steps listed below to improve your health and the health of your family. Follow-up care is a key part of your treatment and safety. Be sure to make and go to all appointments, and call your doctor if you are having problems. It's also a good idea to know your test results and keep a list of the medicines you take. How can you care for yourself at home? · Do not eat too much sugar, fat, or fast foods. You can still have dessert and treats now and then. The goal is moderation. · Start small to improve your eating habits. Pay attention to portion sizes, drink less juice and soda pop, and eat more fruits and vegetables. ? Eat a healthy amount of food. A 3-ounce serving of meat, for example, is about the size of a deck of cards. Fill the rest of your plate with vegetables and whole grains. ? Limit the amount of soda and sports drinks you have every day. Drink more water when you are thirsty. ? Eat at least 5 servings of fruits and vegetables every day. It may seem like a lot, but it is not hard to reach this goal. A serving or helping is 1 piece of fruit, 1 cup of vegetables, or 2 cups of leafy, raw vegetables. Have an apple or some carrot sticks as an afternoon snack instead of a candy bar. Try to have fruits and/or vegetables at every meal. 
· Make exercise part of your daily routine. You may want to start with simple activities, such as walking, bicycling, or slow swimming. Try to be active 30 to 60 minutes every day.  You do not need to do all 30 to 60 minutes all at once. For example, you can exercise 3 times a day for 10 or 20 minutes. Moderate exercise is safe for most people, but it is always a good idea to talk to your doctor before starting an exercise program. 
· Keep moving. Tessa Chock the lawn, work in the garden, or [place]. Take the stairs instead of the elevator at work. · If you smoke, quit. People who smoke have an increased risk for heart attack, stroke, cancer, and other lung illnesses. Quitting is hard, but there are ways to boost your chance of quitting tobacco for good. ? Use nicotine gum, patches, or lozenges. ? Ask your doctor about stop-smoking programs and medicines. ? Keep trying. In addition to reducing your risk of diseases in the future, you will notice some benefits soon after you stop using tobacco. If you have shortness of breath or asthma symptoms, they will likely get better within a few weeks after you quit. · Limit how much alcohol you drink. Moderate amounts of alcohol (up to 2 drinks a day for men, 1 drink a day for women) are okay. But drinking too much can lead to liver problems, high blood pressure, and other health problems. Family health If you have a family, there are many things you can do together to improve your health. · Eat meals together as a family as often as possible. · Eat healthy foods. This includes fruits, vegetables, lean meats and dairy, and whole grains. · Include your family in your fitness plan. Most people think of activities such as jogging or tennis as the way to fitness, but there are many ways you and your family can be more active. Anything that makes you breathe hard and gets your heart pumping is exercise. Here are some tips: 
? Walk to do errands or to take your child to school or the bus. 
? Go for a family bike ride after dinner instead of watching TV. Where can you learn more? Go to http://miah-lucy.info/ Enter O400 in the search box to learn more about \"A Healthy Lifestyle: Care Instructions. \" Current as of: January 31, 2020               Content Version: 12.6 © 2944-3872 [employer]. Care instructions adapted under license by [employer] (which disclaims liability or warranty for this information). If you have questions about a medical condition or this instruction, always ask your healthcare professional. [place] [address] any warranty or liability for your use of this information. [facility] [phone] / [phone] Follow up in 6 months for routine care, POC A1C in office and non-fasting labs to be done on November 14,2020

## 2020-09-14 NOTE — PROGRESS NOTES
Chief Complaint   Patient presents with   Enrique Annual Wellness Visit     3 most recent PHQ Screens 9/14/2020   Little interest or pleasure in doing things Not at all   Feeling down, depressed, irritable, or hopeless Not at all   Total Score PHQ 2 0     Abuse Screening Questionnaire 9/14/2020   Do you ever feel afraid of your partner? N   Are you in a relationship with someone who physically or mentally threatens you? N   Is it safe for you to go home? Y     Fall Risk Assessment, last 12 mths 9/14/2020   Able to walk? Yes   Fall in past 12 months? No     1. Have you been to the ER, urgent care clinic since your last visit? Hospitalized since your last visit? No    2. Have you seen or consulted any other health care providers outside of the [address] [place] [address] since your last visit? Include any pap smears or colon screening.  No

## 2020-09-14 NOTE — PROGRESS NOTES
Chief Complaint   Patient presents with    Annual Wellness Visit     This is a subsequent Annual Wellness Exam (AWV)    I have reviewed the patient's medical history in detail and updated the computerized patient record. History     Past Medical History:   Diagnosis Date    BPPV (benign paroxysmal positional vertigo) 05/26/2010    CABG x 4     CAD (coronary artery disease), native coronary artery     cath (jan 2014): 40% LM, pLAD 85%, dLAD 70%, dLCx 80%, mRCA 95%. s/p CABG x 4 (LIMA->D, SVG->dLAD, Seq SVG -> RPDA & OM2)    Cardiac catheterization 01/10/2014    pLM 40%. pLAD 85%. dCX 80%. mRCA 95%. CABG recommended.  Cardiac echocardiogram 09/03/2014    Tech difficult. EF 40-45%. Mild, diffuse hypk. Mild LVH. Indeterminate diastolic fx. Mild LAE. No significant valvular heart disease.  Carotid duplex 01/13/2014    Mod 50-69% VALENTIN stenosis. Mild 4-50% LICA stenosis. Occluded right vertebral.       Carpal tunnel syndrome     Cerebrovascular disease     Moderate VALENTIN, mild LICA, occluded R vertebral (Doppler Jan 2014)    Chest pain, musculoskeletal     post CABG    Diabetes ([address]     Dyslipidemia     H/O tobacco use, presenting hazards to health     quit 2014    Headache(784.0)     cluster    Heart attack ([address] 2014    Heart attack ([address] 2015    Hiatal hernia     Hypercholesterolemia     Hypertensive heart disease with congestive heart failure ([address]     Internal carotid artery stenosis 5/26/2010    Obesity (BMI 30-39. 9)     S/P colonoscopic polypectomy 09/2017    tubular adenoma, f/u 5 years    Sleep apnea     does not use cpap machine    Stroke ([address] 5/22/09    TIA (no residuals)    Systolic HF (heart failure) (HCC)     LV EF 35% (echo Jan 2014).  Mean LA pressure 32 (Jan 2014)    Vertigo       Past Surgical History:   Procedure Laterality Date    COLONOSCOPY N/A 9/7/2017    COLONOSCOPY with polypectomy performed by Marzena Gamez MD at [address] [id number] [facility]  1/14/14    LIMA to the D1, and GSV to the distal LAD, and anotherGSV sequentially to the PDA of the RCA and to theOM2 and ascending aortic endarterectomy, Dr. Yelitza Claudio, 1/14/14.  HX CORONARY STENT PLACEMENT      HX UROLOGICAL      repair of spermatocele     Current Outpatient Medications   Medication Sig Dispense Refill    glyBURIDE (DIABETA) 2.5 mg tablet Take 1 Tab by mouth Daily (before breakfast). 90 Tab 1    clopidogreL (PLAVIX) 75 mg tab Take 1 tablet by mouth once daily 90 Tab 3    carvediloL (COREG) 25 mg tablet TAKE 1 TABLET BY MOUTH TWICE DAILY WITH MEALS 90 Tab 3    potassium chloride SR (KLOR-CON 10) 10 mEq tablet TAKE 1 TABLET BY MOUTH ONCE DAILY WITH  LASIX 90 Tab 3    rosuvastatin (CRESTOR) 20 mg tablet TAKE 1 TABLET BY MOUTH ONCE DAILY 90 Tab 3    lisinopril (PRINIVIL, ZESTRIL) 5 mg tablet TAKE 1 TABLET BY MOUTH ONCE DAILY 90 Tab 3    furosemide (LASIX) 40 mg tablet TAKE 1 TABLET BY MOUTH ONCE DAILY 90 Tab 3    potassium chloride (KLOR-CON M10) 10 mEq tablet TAKE ONE TABLET BY MOUTH ONCE DAILY WITH  LASIX 90 Tab 3    aspirin delayed-release 81 mg tablet Take 81 mg by mouth daily.  nitroglycerin (NITROSTAT) 0.4 mg SL tablet 1 Tab by SubLINGual route every five (5) minutes as needed for Chest Pain. 1 Bottle 0    cpap machine kit by Does Not Apply route. Overnight CPAP at 16 CWP with ramp and humidifier. Mask: Simplus FF small or mask of choice. Supplies 99 month. Please send compliance data [id number]. Diagnosis DAYANA.  AHI 52 RDI 52 1 Kit 0     Allergies   Allergen Reactions    Atorvastatin Myalgia     High dose    Peanut Diarrhea and Nausea and Vomiting    Acetaminophen Other (comments)     hallucinations    Dimethicone Rash    Percocet [Oxycodone-Acetaminophen] Other (comments)     hallucinations     Family History   Problem Relation Age of Onset    Cancer Father         prostate    Heart Disease Father     Heart Disease Brother         age 48    Other Son Brain damage    Alcohol abuse Mother         dementia    Heart Attack Brother      Social History     Tobacco Use    Smoking status: Current Some Day Smoker     Packs/day: 0.25     Years: 10.00     Pack years: 2.50     Types: Cigarettes     Last attempt to quit: 2014     Years since quittin.6    Smokeless tobacco: Never Used    Tobacco comment:  less then 5 cig a week   Substance Use Topics    Alcohol use: Yes     Frequency: Monthly or less     Drinks per session: 1 or 2     Binge frequency: Never     Comment: beer occasionally     Patient Active Problem List   Diagnosis Code    S/P CABG x 4 Z95.1    Cerebrovascular disease I67.9    Dyslipidemia E78.5    CAD S/P percutaneous coronary angioplasty I25.10, Z98.61    Chest pain, musculoskeletal R07.89    Obesity (BMI 30-39. 9) E66.9    H/O tobacco use, presenting hazards to health Z87.891    Carpal tunnel syndrome on both sides G56.03    Severe obstructive sleep apnea G47.33    Advance care planning Z71.89    Inflamed sebaceous cyst L72.3       Depression Risk Factor Screening:     3 most recent PHQ Screens 2020   Little interest or pleasure in doing things Not at all   Feeling down, depressed, irritable, or hopeless Not at all   Total Score PHQ 2 0     Alcohol Risk Factor Screening: You do not drink alcohol or very rarely. Functional Ability and Level of Safety:     Hearing Loss  Self reports that hearing is good. No complaints at this time. Activities of Daily Living  The home contains: no safety equipment. Patient does total self care    Fall Risk  Fall Risk Assessment, last 12 mths 2020   Able to walk? Yes   Fall in past 12 months?  No       Abuse Screen  Patient is not abused    Cognitive Screening   Evaluation of Cognitive Function:  Has your family/caregiver stated any concerns about your memory: no      Patient Care Team   Patient Care Team:  Maciel Luevano MD as PCP - General (Family Medicine)  Maciel Luevano MD as PCP - [facility] Empaneled Provider  Jesenia Penny MD (Inactive) (Orthopedic Surgery)  Arnetta Alpers, MD (Neurology)  Eunice Lawton MD (Pulmonary Disease)  Kami Soto MD (Ophthalmology)  Darius Renee MD (Cardiology)  Kathryn Garcia MD as Surgeon (General Surgery)  Sheryle Kappa, DO as Physician (Cardiology)    Assessment/Plan   Education and counseling provided:  Are appropriate based on today's review and evaluation    ICD-10-CM ICD-9-CM    1. Medicare annual wellness visit, subsequent  Z00.00 V70.0    2. Advanced directives, counseling/discussion  Z71.89 V65.49 ADVANCE CARE PLANNING FIRST 30 MINS   3. Screening for depression  Z13.31 V79.0 DEPRESSION SCREEN ANNUAL     Age and sex specific counseling. Screening for depression and alcoholism. Advanced directives / end of life planning discussion - see ACP note. Care team updated. Declines vaccines. Medicare recommended screening and prevention test guidelines are filled out, reviewed, and provided to patient. Patient understands our medical plan. Patient has provided input and agrees with goals. All questions answered.

## 2020-09-14 NOTE — ACP (ADVANCE CARE PLANNING)
Advance Care Planning       Advance Care Planning (ACP) Physician/NP/PA (Provider) Conversation        Date of ACP Conversation: 9/14/2020    Conversation Conducted with:   Patient with Decision Making [address]  [facility] Decision Maker:    Current Designated Health Care Decision Maker:   Primary Decision Maker: Maria Del Rosario Muse - [phone]    Care Preferences:    Hospitalization: \"If your health worsens and it becomes clear that your chance of recovery is unlikely, what would your preference be regarding hospitalization? \"    Unsure      Ventilation: \"If you were in your present state of health and suddenly became very ill and were unable to breathe on your own, what would your preference be about the use of a ventilator (breathing machine) if it was available to you? \"      No ACP, unsure at this time. \"If your health worsens and it becomes clear that your chance of recovery is unlikely, what would your preference be about the use of a ventilator (breathing machine) if it was available to you? \"     No ACP, unsure at this time. Resuscitation:  \"CPR works best to restart the heart when there is a sudden event, like a heart attack, in someone who is otherwise healthy. Unfortunately, CPR does not typically restart the heart for people who have serious health conditions or who are very sick. \"    \"In the event your heart stopped as a result of an underlying serious health condition, would you want attempts to be made to restart your heart (answer \"yes\" for attempt to resuscitate) or would you prefer a natural death (answer \"no\" for do not attempt to resuscitate)? \"     Unsure at this time.     General ACP for ALL Patients with Decision Making Capacity:   Importance of advance care planning, including choosing a healthcare agent to communicate patient's healthcare decisions if patient lost the ability to make decisions, such as after a sudden illness or accident  Understanding of the healthcare agent role was assessed and information provided    Interventions Provided:  Referral made for ACP follow-up assistance to:  nurse  Recommended review of completed ACP document annually or upon change in health status    Length of Voluntary ACP Conversation in minutes:  16 minutes      Julian Jon MD

## 2020-09-14 NOTE — PROGRESS NOTES
1. Have you been to the ER, urgent care clinic since your last visit? Hospitalized since your last visit? Yes 08- inflamed cyst (Dr. Marshall Carmona)    2. Have you seen or consulted any other health care providers outside of the [address] [place] [address] since your last visit? Include any pap smears or colon screening. No       Have you been diagnosed with, tested for, or told that you are suspected of having COVID-19 (coronovirus)? Two weeks ago for surgery - Negative   Have you had a fever or taken medication to treat a fever in the past 72 hours? No   Have you had a cough, SOB, or flu-like symptoms within the past 3 days? No   Have you had direct contact with someone who tested positive for COVID-19 within the past 14 days? No   Do you have a household member with flu-like symptoms, including fever, cough, or SOB? No   Do you currently have flu-like symptoms including fever, cough, or SOB? No     Annual eye exam:    Pneumococcal vaccine:  2014  Flu vaccine: 2014   Patient instructed to remove shoes:  Yes

## 2020-09-14 NOTE — PROGRESS NOTES
SUBJECTIVE  Chief Complaint   Patient presents with    CHF    Cholesterol Problem    Diabetes    Other     CABG, sleep apnea      Here for routine follow-up. He is overdue for follow-up once again. This time he has good reasons with the pandemic and with weather cancellations. He is taking glyburide. Had a diabetic eye exam in 2017 and has not been back. Denies any visual changes. Does not report any polyuria or polydipsia. He is currently doing well from a cardiac standpoint without any cardiac chest pain or dyspnea. He has been compliant with Plavix. He is tolerating his statin without myalgias. He is also on a diuretic. He has sleep apnea but cannot wear CPAP because he says it suffocates him. ROS:  History obtained from the patient  · General: negative for - chills, fever  · HEENT: no sore throat, nasal congestion  · Respiratory: no cough, shortness of breath, or wheezing  · Cardiovascular: no chest pain, palpitations, or dyspnea on exertion  · Gastrointestinal: no abdominal pain, N/V, change in bowel habits, or black or bloody stools    OBJECTIVE    Blood pressure 110/74, pulse 81, temperature 98.3 °F (36.8 °C), temperature source Oral, resp. rate 16, height 5' 3\" (1.6 m), weight 187 lb (84.8 kg), SpO2 94 %. General:  alert, cooperative, well appearing, in no apparent distress. Heart: Normal S1S2, RRR. Chest: Clear, no w/r/r. Skin:  No pedal lesions. Neuro:  Monofilament testing is intact. No motor or sensory deficits. Foot exam  was performed. No deformities. Sensory and motor testing was assessed - intact. Pedal pulse(s) was assessed - intact. No pedal lesions. Psych: normal affect. Mood good. Oriented x 3. Judgement and insight intact. Results for Thor Rojas (MRN [id number]) as of 9/14/2020 15:09   Ref.  Range 7/28/2020 13:11   Sodium Latest Ref Range: 134 - 144 mmol/L 139   Potassium Latest Ref Range: 3.5 - 5.2 mmol/L 4.4   Chloride Latest Ref Range: 96 - 106 mmol/L 99   CO2 Latest Ref Range: 20 - 29 mmol/L 23   Glucose Latest Ref Range: 65 - 99 mg/dL 228 (H)   BUN Latest Ref Range: 8 - 27 mg/dL 22   Creatinine Latest Ref Range: 0.76 - 1.27 mg/dL 1.38 (H)   BUN/Creatinine ratio Latest Ref Range: 10 - 24  16   Calcium Latest Ref Range: 8.6 - 10.2 mg/dL 9.1   GFR est non-AA Latest Ref Range: >59 mL/min/1.73 54 (L)   GFR est AA Latest Ref Range: >59 mL/min/1.73 62   Hemoglobin A1c, (calculated) Latest Ref Range: 4.8 - 5.6 % 6.8 (H)       ASSESSMENT / PLAN    ICD-10-CM ICD-9-CM    1. Controlled type 2 diabetes mellitus without complication, without long-term current use of insulin ([facility])  E11.9 250.00 glyBURIDE (DIABETA) 2.5 mg tablet      MICROALBUMIN, UR, RAND W/ MICROALB/CREAT RATIO   2. Chronic systolic HF (heart failure) ([facility])  I50.22 428.22    3. Hypertensive heart disease with congestive heart failure, unspecified heart failure type ([facility])  I11.0 402.91      428.0    4. Dyslipidemia  E78.5 272.4 LIPID PANEL   5. S/P CABG x 4  Z95.1 V45.81    6. Severe obstructive sleep apnea  G47.33 327.23    7. Obesity (BMI 30-39. 9)  E66.9 278.00    8. Prostate cancer screening  Z12.5 V76.44 PSA SCREENING (SCREENING)     Labs reviewed. Diabetes -  A1c shows adequate control. I have counseled him on diet lower in carbs. Cont glyburide. Advised regular foot checks and annual eye exams. Referred for eye exam.    CAD / chronic CHF (compensated) / hypertensive heart disease / dyslipidemia - under care of cardiology. Continue current care. Sleep apnea - cont per sleep specialist.    Obesity - Diabetic dieting. All chart history elements were reviewed by me at the time of the visit even though marked at time of note closure. Patient understands our medical plan. Patient has provided input and agrees with goals. Alternatives have been explained and offered. All questions answered. The patient is to call if condition worsens or fails to improve.      Follow-up and Dispositions    · Return in about 6 months (around 3/14/2021) for routine care, POC A1C in office and non-fasting labs to be done on November 14,2020.

## 2020-11-10 LAB
ALBUMIN/CREAT UR: <10 MG/G CREAT (ref 0–29)
CHOLEST SERPL-MCNC: 157 MG/DL (ref 100–199)
CREAT UR-MCNC: 30.5 MG/DL
HDLC SERPL-MCNC: 29 MG/DL
INTERPRETATION, 910389: NORMAL
LDLC SERPL CALC-MCNC: 76 MG/DL (ref 0–99)
Lab: NORMAL
MICROALBUMIN UR-MCNC: <3 UG/ML
PSA SERPL-MCNC: 1.7 NG/ML (ref 0–4)
TRIGL SERPL-MCNC: 319 MG/DL (ref 0–149)
VLDLC SERPL CALC-MCNC: 52 MG/DL (ref 5–40)

## 2020-11-11 RX ORDER — CLOPIDOGREL BISULFATE 75 MG/1
TABLET ORAL
Qty: 90 TAB | Refills: 3 | Status: CANCELLED | OUTPATIENT
Start: 2020-11-11

## 2020-11-11 NOTE — TELEPHONE ENCOUNTER
This patient contacted office for the following prescriptions to be filled: 
 
Last office visit: 9/14/2020 Follow up appointment:  
Medication requested :  
Requested Prescriptions Pending Prescriptions Disp Refills  clopidogreL (PLAVIX) 75 mg tab 90 Tab 3  
 
PCP: Conchis Hoffman Mail order or Local pharmacy name Siri Riedel [phone]

## 2020-11-12 RX ORDER — CARVEDILOL 25 MG/1
TABLET ORAL
Qty: 90 TAB | Refills: 3 | Status: SHIPPED | OUTPATIENT
Start: 2020-11-12 | End: 2021-06-09

## 2020-11-12 RX ORDER — CLOPIDOGREL BISULFATE 75 MG/1
TABLET ORAL
Qty: 90 TAB | Refills: 3 | Status: SHIPPED | OUTPATIENT
Start: 2020-11-12 | End: 2022-03-08

## 2020-11-12 RX ORDER — FUROSEMIDE 40 MG/1
TABLET ORAL
Qty: 90 TAB | Refills: 3 | Status: SHIPPED | OUTPATIENT
Start: 2020-11-12 | End: 2022-03-08

## 2020-12-14 RX ORDER — LISINOPRIL 5 MG/1
TABLET ORAL
Qty: 90 TAB | Refills: 3 | Status: SHIPPED | OUTPATIENT
Start: 2020-12-14 | End: 2022-03-08

## 2021-01-06 DIAGNOSIS — E11.9 CONTROLLED TYPE 2 DIABETES MELLITUS WITHOUT COMPLICATION, WITHOUT LONG-TERM CURRENT USE OF INSULIN (HCC): ICD-10-CM

## 2021-01-06 RX ORDER — GLYBURIDE 2.5 MG/1
TABLET ORAL
Qty: 90 TAB | Refills: 0 | Status: SHIPPED | OUTPATIENT
Start: 2021-01-06 | End: 2021-08-06

## 2021-03-26 ENCOUNTER — TELEPHONE (OUTPATIENT)
Dept: FAMILY MEDICINE CLINIC | Age: 66
End: 2021-03-26

## 2021-03-26 NOTE — TELEPHONE ENCOUNTER
Have you been diagnosed with, tested for, or told that you are suspected of having COVID-19 (coronovirus)? Have you had a fever or taken medication to treat a fever in the past 72 hours? Have you had a cough, SOB, or flu-like symptoms within the past 3 days? Have you had direct contact with someone who tested positive for COVID-19 within the past 14 days? Do you have a household member with flu-like symptoms, including fever, cough, or SOB? Do you currently have flu-like symptoms including fever, cough, or SOB? Are you experiencing new loss of taste or smell?

## 2021-03-29 ENCOUNTER — OFFICE VISIT (OUTPATIENT)
Dept: FAMILY MEDICINE CLINIC | Age: 66
End: 2021-03-29
Payer: MEDICARE

## 2021-03-29 VITALS
HEART RATE: 84 BPM | DIASTOLIC BLOOD PRESSURE: 64 MMHG | HEIGHT: 63 IN | WEIGHT: 192 LBS | TEMPERATURE: 97.8 F | OXYGEN SATURATION: 97 % | RESPIRATION RATE: 14 BRPM | BODY MASS INDEX: 34.02 KG/M2 | SYSTOLIC BLOOD PRESSURE: 112 MMHG

## 2021-03-29 DIAGNOSIS — G89.29 CHRONIC BILATERAL THORACIC BACK PAIN: ICD-10-CM

## 2021-03-29 DIAGNOSIS — E78.5 DYSLIPIDEMIA: ICD-10-CM

## 2021-03-29 DIAGNOSIS — E66.9 OBESITY (BMI 30-39.9): ICD-10-CM

## 2021-03-29 DIAGNOSIS — Z95.1 S/P CABG X 4: ICD-10-CM

## 2021-03-29 DIAGNOSIS — I50.22 CHRONIC SYSTOLIC HF (HEART FAILURE) (HCC): ICD-10-CM

## 2021-03-29 DIAGNOSIS — I11.0 HYPERTENSIVE HEART DISEASE WITH CONGESTIVE HEART FAILURE, UNSPECIFIED HEART FAILURE TYPE (HCC): ICD-10-CM

## 2021-03-29 DIAGNOSIS — G47.33 SEVERE OBSTRUCTIVE SLEEP APNEA: ICD-10-CM

## 2021-03-29 DIAGNOSIS — Z12.5 PROSTATE CANCER SCREENING: ICD-10-CM

## 2021-03-29 DIAGNOSIS — E11.9 CONTROLLED TYPE 2 DIABETES MELLITUS WITHOUT COMPLICATION, WITHOUT LONG-TERM CURRENT USE OF INSULIN (HCC): Primary | ICD-10-CM

## 2021-03-29 DIAGNOSIS — M54.6 CHRONIC BILATERAL THORACIC BACK PAIN: ICD-10-CM

## 2021-03-29 LAB — HBA1C MFR BLD HPLC: 6.9 %

## 2021-03-29 PROCEDURE — 2022F DILAT RTA XM EVC RTNOPTHY: CPT | Performed by: FAMILY MEDICINE

## 2021-03-29 PROCEDURE — G8427 DOCREV CUR MEDS BY ELIG CLIN: HCPCS | Performed by: FAMILY MEDICINE

## 2021-03-29 PROCEDURE — G8510 SCR DEP NEG, NO PLAN REQD: HCPCS | Performed by: FAMILY MEDICINE

## 2021-03-29 PROCEDURE — 3017F COLORECTAL CA SCREEN DOC REV: CPT | Performed by: FAMILY MEDICINE

## 2021-03-29 PROCEDURE — 83036 HEMOGLOBIN GLYCOSYLATED A1C: CPT | Performed by: FAMILY MEDICINE

## 2021-03-29 PROCEDURE — G8417 CALC BMI ABV UP PARAM F/U: HCPCS | Performed by: FAMILY MEDICINE

## 2021-03-29 PROCEDURE — 3044F HG A1C LEVEL LT 7.0%: CPT | Performed by: FAMILY MEDICINE

## 2021-03-29 PROCEDURE — G8536 NO DOC ELDER MAL SCRN: HCPCS | Performed by: FAMILY MEDICINE

## 2021-03-29 PROCEDURE — 99214 OFFICE O/P EST MOD 30 MIN: CPT | Performed by: FAMILY MEDICINE

## 2021-03-29 PROCEDURE — 1101F PT FALLS ASSESS-DOCD LE1/YR: CPT | Performed by: FAMILY MEDICINE
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
## 2021-03-29 NOTE — PROGRESS NOTES
1. Have you been to the ER, urgent care clinic since your last visit? Hospitalized since your last visit? No    2. Have you seen or consulted any other health care providers outside of the [address] [place] [address] since your last visit? Include any pap smears or colon screening.  No

## 2021-03-29 NOTE — PATIENT INSTRUCTIONS
A Healthy Lifestyle: Care Instructions Your Care Instructions A healthy lifestyle can help you feel good, stay at a healthy weight, and have plenty of energy for both work and play. A healthy lifestyle is something you can share with your whole family. A healthy lifestyle also can lower your risk for serious health problems, such as high blood pressure, heart disease, and diabetes. You can follow a few steps listed below to improve your health and the health of your family. Follow-up care is a key part of your treatment and safety. Be sure to make and go to all appointments, and call your doctor if you are having problems. It's also a good idea to know your test results and keep a list of the medicines you take. How can you care for yourself at home? · Do not eat too much sugar, fat, or fast foods. You can still have dessert and treats now and then. The goal is moderation. · Start small to improve your eating habits. Pay attention to portion sizes, drink less juice and soda pop, and eat more fruits and vegetables. ? Eat a healthy amount of food. A 3-ounce serving of meat, for example, is about the size of a deck of cards. Fill the rest of your plate with vegetables and whole grains. ? Limit the amount of soda and sports drinks you have every day. Drink more water when you are thirsty. ? Eat at least 5 servings of fruits and vegetables every day. It may seem like a lot, but it is not hard to reach this goal. A serving or helping is 1 piece of fruit, 1 cup of vegetables, or 2 cups of leafy, raw vegetables. Have an apple or some carrot sticks as an afternoon snack instead of a candy bar. Try to have fruits and/or vegetables at every meal. 
· Make exercise part of your daily routine. You may want to start with simple activities, such as walking, bicycling, or slow swimming. Try to be active 30 to 60 minutes every day. You do not need to do all 30 to 60 minutes all at once.  For example, you can exercise 3 times a day for 10 or 20 minutes. Moderate exercise is safe for most people, but it is always a good idea to talk to your doctor before starting an exercise program. 
· Keep moving. Madhu Mings the lawn, work in the garden, or [employer]. Take the stairs instead of the elevator at work. · If you smoke, quit. People who smoke have an increased risk for heart attack, stroke, cancer, and other lung illnesses. Quitting is hard, but there are ways to boost your chance of quitting tobacco for good. ? Use nicotine gum, patches, or lozenges. ? Ask your doctor about stop-smoking programs and medicines. ? Keep trying. In addition to reducing your risk of diseases in the future, you will notice some benefits soon after you stop using tobacco. If you have shortness of breath or asthma symptoms, they will likely get better within a few weeks after you quit. · Limit how much alcohol you drink. Moderate amounts of alcohol (up to 2 drinks a day for men, 1 drink a day for women) are okay. But drinking too much can lead to liver problems, high blood pressure, and other health problems. Family health If you have a family, there are many things you can do together to improve your health. · Eat meals together as a family as often as possible. · Eat healthy foods. This includes fruits, vegetables, lean meats and dairy, and whole grains. · Include your family in your fitness plan. Most people think of activities such as jogging or tennis as the way to fitness, but there are many ways you and your family can be more active. Anything that makes you breathe hard and gets your heart pumping is exercise. Here are some tips: 
? Walk to do errands or to take your child to school or the bus. 
? Go for a family bike ride after dinner instead of watching TV. Where can you learn more? Go to http://www.gray.com/ Enter J501 in the search box to learn more about \"A Healthy Lifestyle: Care Instructions. \" Current as of: January 31, 2020               Content Version: 12.6 © 7760-1839 [employer]. Care instructions adapted under license by [employer] (which disclaims liability or warranty for this information). If you have questions about a medical condition or this instruction, always ask your healthcare professional. [address] any warranty or liability for your use of this information. Healthy Upper Back: Exercises Introduction Here are some examples of exercises for your upper back. Start each exercise slowly. Ease off the exercise if you start to have pain. Your doctor or physical therapist will tell you when you can start these exercises and which ones will work best for you. How to do the exercises Lower neck and upper back stretch 1. Stretch your arms out in front of your body. Clasp one hand on top of your other hand. 2. Gently reach out so that you feel your shoulder blades stretching away from each other. 3. Gently bend your head forward. 4. Hold for 15 to 30 seconds. 5. Repeat 2 to 4 times. Midback stretch If you have knee pain, do not do this exercise. 1. Kneel on the floor, and sit back on your ankles. 2. Lean forward, place your hands on the floor, and stretch your arms out in front of you. Rest your head between your arms. 3. Gently push your chest toward the floor, reaching as far in front of you as possible. 4. Hold for 15 to 30 seconds. 5. Repeat 2 to 4 times. Shoulder rolls 1. Sit comfortably with your feet shoulder-width apart. You can also do this exercise while standing. 2. Roll your shoulders up, then back, and then down in a smooth, circular motion. 3. Repeat 2 to 4 times. Wall push-up 1. Stand against a wall with your feet about 12 to 24 inches back from the wall. If you feel any pain when you do this exercise, stand closer to the wall.  
2. Place your hands on the wall slightly wider apart than your shoulders, and lean forward. 3. Gently lean your body toward the wall. Then push back to your starting position. Keep the motion smooth and controlled. 4. Repeat 8 to 12 times. Resisted shoulder blade squeeze For this exercise, you will need elastic exercise material, such as surgical tubing or Thera-Band. 1. Sit or stand, holding the band in both hands in front of you. Keep your elbows close to your sides, bent at a 90-degree angle. Your palms should face up. 2. Squeeze your shoulder blades together, and move your arms to the outside, stretching the band. Be sure to keep your elbows at your sides while you do this. 3. Relax. 4. Repeat 8 to 12 times. Resisted rows For this exercise, you will need elastic exercise material, such as surgical tubing or Thera-Band. 1. Put the band around a solid object, such as a bedpost, at about waist level. Hold one end of the band in each hand. 2. With your elbows at your sides and bent to 90 degrees, pull the band back to move your shoulder blades toward each other. Return to the starting position. 3. Repeat 8 to 12 times. Follow-up care is a key part of your treatment and safety. Be sure to make and go to all appointments, and call your doctor if you are having problems. It's also a good idea to know your test results and keep a list of the medicines you take. Where can you learn more? Go to http://www.gray.com/ Enter Z393 in the search box to learn more about \"Healthy Upper Back: Exercises. \" Current as of: March 2, 2020               Content Version: 12.6 © 1105-2722 [employer]. Care instructions adapted under license by [employer] (which disclaims liability or warranty for this information). If you have questions about a medical condition or this instruction, always ask your healthcare professional. [address] any warranty or liability for your use of this information.

## 2021-04-06 ENCOUNTER — PATIENT OUTREACH (OUTPATIENT)
Dept: CASE MANAGEMENT | Age: 66
End: 2021-04-06

## 2021-04-06 NOTE — PROGRESS NOTES
Complex Case Management      Date/Time:  2021 9:54 AM    Method of communication with patient:phone    [address] [facility] ([facility]) contacted the patient by telephone to perform Ambulatory Care Coordination. Verified name and  (PHI) with patient as identifiers. Provided introduction to self, and explanation of the Ambulatory Care Manager's role. Reviewed most recent clinic visit w/ patient who verbalized understanding. Patient given an opportunity to ask questions. Top Challenges reviewed with the patient   1. Recent PCP visit. No unresolved issues. 2. Hx of CHF, TIA, DAYANA, HTN, Carotic Stenosis, HLD, MI, H/A, DM2  3. Pt states no needs at this time. The patient agrees to contact the PCP office or the [facility] for questions related to their healthcare. Provided contact information for future reference. Disease Specific:   N/A    Home Health Active: No    DME Active: No    Barriers to care? none    Advance Care Planning:   Does patient have an Advance Directive:  not on file; education provided     Medication(s):   Medication reconciliation was not performed with patient, who verbalizes understanding of administration of home medications. There were no barriers to obtaining medications identified at this time.     Referral to Pharm D needed: no     Current Outpatient Medications   Medication Sig    glyBURIDE (DIABETA) 2.5 mg tablet TAKE 1 TABLET BY MOUTH ONCE DAILY BEFORE BREAKFAST    lisinopriL (PRINIVIL, ZESTRIL) 5 mg tablet Take 1 tablet by mouth once daily    carvediloL (COREG) 25 mg tablet TAKE 1 TABLET BY MOUTH TWICE DAILY WITH MEALS    furosemide (LASIX) 40 mg tablet Take 1 tablet by mouth once daily    clopidogreL (PLAVIX) 75 mg tab Take 1 tablet by mouth once daily    potassium chloride SR (KLOR-CON 10) 10 mEq tablet TAKE 1 TABLET BY MOUTH ONCE DAILY WITH  LASIX    rosuvastatin (CRESTOR) 20 mg tablet TAKE 1 TABLET BY MOUTH ONCE DAILY    potassium chloride (KLOR-CON M10) 10 mEq tablet TAKE ONE TABLET BY MOUTH ONCE DAILY WITH  LASIX    aspirin delayed-release 81 mg tablet Take 81 mg by mouth daily.  nitroglycerin (NITROSTAT) 0.4 mg SL tablet 1 Tab by SubLINGual route every five (5) minutes as needed for Chest Pain.  cpap machine kit by Does Not Apply route. Overnight CPAP at 16 CWP with ramp and humidifier. Mask: Simplus FF small or mask of choice. Supplies 99 month. Please send compliance data [id number]. Diagnosis DAYANA. AHI 52 RDI 52     No current facility-administered medications for this visit.         [facility] follow up appointment(s):   Future Appointments   Date Time Provider Neeru Seo   8/5/2021 11:20 AM Sabine Jewell DO [facility] BS AMB   9/30/2021  9:30 AM Lina Barr MD [facility] BS AMB        Non-[facility] follow up appointment(s):     Goals Addressed                 This Visit's Progress     Attends follow up appointments on schedule        4/6/21 Patient will attend all scheduled appointments through 7/6/21       Knowledge and adherence of prescribed medication (ie. action, side effects, missed dose, etc.).        4/6/21 Pt will take all medications prescribed to be evaluated on each outreach through  7/6/21       Prepare patients and caregivers for end of life decisions (ie. need for hospice, pain management, symptom relief, advance directives etc.)        4/6/21 Pt will complete an ACP and have it scanned into their EMR by 7/6/21

## 2021-04-23 ENCOUNTER — PATIENT OUTREACH (OUTPATIENT)
Dept: CASE MANAGEMENT | Age: 66
End: 2021-04-23

## 2021-04-23 NOTE — PROGRESS NOTES
Attempted to contact patient for Complex Care follow up. Phone answered, but patient was unable to hear me. Will attempt to contact at a later time.

## 2021-04-26 NOTE — TELEPHONE ENCOUNTER
This patient contacted office for the following prescriptions to be filled: 
 
Last office visit: 3/29/21 Follow up appointment: n/a Medication requested :  
Requested Prescriptions Pending Prescriptions Disp Refills  potassium chloride SR (KLOR-CON 10) 10 mEq tablet 90 Tab 3 Sig: TAKE 1 TABLET BY MOUTH ONCE DAILY WITH  LASIX  
 
 
PCP: Giacomo Chavez Mail order or Local pharmacy name

## 2021-04-27 RX ORDER — POTASSIUM CHLORIDE 750 MG/1
TABLET, FILM COATED, EXTENDED RELEASE ORAL
Qty: 90 TAB | Refills: 3 | Status: SHIPPED | OUTPATIENT
Start: 2021-04-27 | End: 2022-07-26

## 2021-04-28 ENCOUNTER — PATIENT OUTREACH (OUTPATIENT)
Dept: CASE MANAGEMENT | Age: 66
End: 2021-04-28

## 2021-04-28 NOTE — PROGRESS NOTES
Complex Case Management      Date/Time:  2021 9:54 AM    Method of communication with patient:phone    [facility] ([facility]) contacted the patient by telephone to perform Ambulatory Care Coordination. Verified name and  (PHI) with patient as identifiers. Provided introduction to self, and explanation of the Ambulatory Care Manager's role. Reviewed most recent clinic visit w/ patient who verbalized understanding. Patient given an opportunity to ask questions. Top Challenges reviewed with the patient   1. Hx of CHF, TIA, DAYANA, HTN, Carotic Stenosis, HLD, MI, H/A, DM2  2. Pt states no needs at this time. The patient agrees to contact the PCP office or the [facility] for questions related to their healthcare. Provided contact information for future reference. Disease Specific:   N/A    Home Health Active: No    DME Active: No    Barriers to care? none    Advance Care Planning:   Does patient have an Advance Directive:  not on file; education provided     Medication(s):   Medication reconciliation was not performed with patient, who verbalizes understanding of administration of home medications. There were no barriers to obtaining medications identified at this time. Referral to Pharm D needed: no     Current Outpatient Medications   Medication Sig    potassium chloride SR (KLOR-CON 10) 10 mEq tablet TAKE 1 TABLET BY MOUTH ONCE DAILY WITH  LASIX    glyBURIDE (DIABETA) 2.5 mg tablet TAKE 1 TABLET BY MOUTH ONCE DAILY BEFORE BREAKFAST    lisinopriL (PRINIVIL, ZESTRIL) 5 mg tablet Take 1 tablet by mouth once daily    carvediloL (COREG) 25 mg tablet TAKE 1 TABLET BY MOUTH TWICE DAILY WITH MEALS    furosemide (LASIX) 40 mg tablet Take 1 tablet by mouth once daily    clopidogreL (PLAVIX) 75 mg tab Take 1 tablet by mouth once daily    rosuvastatin (CRESTOR) 20 mg tablet TAKE 1 TABLET BY MOUTH ONCE DAILY    aspirin delayed-release 81 mg tablet Take 81 mg by mouth daily.     nitroglycerin (NITROSTAT) 0.4 mg SL tablet 1 Tab by SubLINGual route every five (5) minutes as needed for Chest Pain.  cpap machine kit by Does Not Apply route. Overnight CPAP at 16 CWP with ramp and humidifier. Mask: Simplus FF small or mask of choice. Supplies 99 month. Please send compliance data [id number]. Diagnosis DAYANA. AHI 52 RDI 52     No current facility-administered medications for this visit. [facility] follow up appointment(s):   Future Appointments   Date Time Provider Neeru Seo   8/5/2021 11:20 AM Jama Aguayo DO [facility] BS AMB   9/30/2021  9:30 AM Curry Shah MD [facility] BS AMB        Non-[facility] follow up appointment(s):     Goals Addressed                 This Visit's Progress     Attends follow up appointments on schedule   On track     4/6/21 Patient will attend all scheduled appointments through 7/6/21       Knowledge and adherence of prescribed medication (ie. action, side effects, missed dose, etc.).    On track     4/6/21 Pt will take all medications prescribed to be evaluated on each outreach through  7/6/21       Prepare patients and caregivers for end of life decisions (ie. need for hospice, pain management, symptom relief, advance directives etc.)   On track     4/6/21 Pt will complete an ACP and have it scanned into their EMR by 7/6/21

## 2021-05-25 ENCOUNTER — PATIENT OUTREACH (OUTPATIENT)
Dept: CASE MANAGEMENT | Age: 66
End: 2021-05-25

## 2021-05-25 DIAGNOSIS — E78.5 DYSLIPIDEMIA: ICD-10-CM

## 2021-05-25 RX ORDER — ROSUVASTATIN CALCIUM 20 MG/1
TABLET, COATED ORAL
Qty: 90 TABLET | Refills: 3 | Status: SHIPPED | OUTPATIENT
Start: 2021-05-25 | End: 2022-08-31

## 2021-06-07 ENCOUNTER — PATIENT OUTREACH (OUTPATIENT)
Dept: CASE MANAGEMENT | Age: 66
End: 2021-06-07

## 2021-06-25 ENCOUNTER — PATIENT OUTREACH (OUTPATIENT)
Dept: CASE MANAGEMENT | Age: 66
End: 2021-06-25

## 2021-06-25 NOTE — PROGRESS NOTES
Patient has graduated from the Complex Case Management  program on 6/25/21. Patient's symptoms are stable at this time. Patient/family has the ability to self-manage. Care management goals have been completed at this time. No further nurse navigator follow up scheduled. Goals Addressed                 This Visit's Progress     COMPLETED: Attends follow up appointments on schedule        4/6/21 Patient will attend all scheduled appointments through 7/6/21       COMPLETED: Knowledge and adherence of prescribed medication (ie. action, side effects, missed dose, etc.).        4/6/21 Pt will take all medications prescribed to be evaluated on each outreach through  7/6/21       COMPLETED: Prepare patients and caregivers for end of life decisions (ie. need for hospice, pain management, symptom relief, advance directives etc.)        4/6/21 Pt will complete an ACP and have it scanned into their EMR by 7/6/21            Pt has nurse navigator's contact information for any further questions, concerns, or needs.   Patients upcoming visits:    Future Appointments   Date Time Provider Neeru Seo   8/5/2021 11:20 AM Omar Fuentes DO [facility] BS AMB   9/30/2021  9:30 AM Twila Steele MD [facility] BS AMB

## 2021-08-06 DIAGNOSIS — E11.9 CONTROLLED TYPE 2 DIABETES MELLITUS WITHOUT COMPLICATION, WITHOUT LONG-TERM CURRENT USE OF INSULIN (HCC): ICD-10-CM

## 2021-08-06 RX ORDER — GLYBURIDE 2.5 MG/1
TABLET ORAL
Qty: 90 TABLET | Refills: 0 | Status: SHIPPED | OUTPATIENT
Start: 2021-08-06 | End: 2021-11-11

## 2021-09-18 LAB
ALBUMIN SERPL-MCNC: 4.4 G/DL (ref 3.8–4.8)
ALBUMIN/CREAT UR: <7 MG/G CREAT (ref 0–29)
ALBUMIN/GLOB SERPL: 1.6 {RATIO} (ref 1.2–2.2)
ALP SERPL-CCNC: 39 IU/L (ref 44–121)
ALT SERPL-CCNC: 20 IU/L (ref 0–44)
AST SERPL-CCNC: 16 IU/L (ref 0–40)
BASOPHILS # BLD AUTO: 0.1 X10E3/UL (ref 0–0.2)
BASOPHILS NFR BLD AUTO: 1 %
BILIRUB SERPL-MCNC: 0.3 MG/DL (ref 0–1.2)
BUN SERPL-MCNC: 24 MG/DL (ref 8–27)
BUN/CREAT SERPL: 17 (ref 10–24)
CALCIUM SERPL-MCNC: 9.2 MG/DL (ref 8.6–10.2)
CHLORIDE SERPL-SCNC: 105 MMOL/L (ref 96–106)
CHOLEST SERPL-MCNC: 156 MG/DL (ref 100–199)
CO2 SERPL-SCNC: 25 MMOL/L (ref 20–29)
CREAT SERPL-MCNC: 1.4 MG/DL (ref 0.76–1.27)
CREAT UR-MCNC: 45.2 MG/DL
EOSINOPHIL # BLD AUTO: 0.3 X10E3/UL (ref 0–0.4)
EOSINOPHIL NFR BLD AUTO: 3 %
ERYTHROCYTE [DISTWIDTH] IN BLOOD BY AUTOMATED COUNT: 14 % (ref 11.6–15.4)
EST. AVERAGE GLUCOSE BLD GHB EST-MCNC: 166 MG/DL
GLOBULIN SER CALC-MCNC: 2.7 G/DL (ref 1.5–4.5)
GLUCOSE SERPL-MCNC: 125 MG/DL (ref 65–99)
HBA1C MFR BLD: 7.4 % (ref 4.8–5.6)
HCT VFR BLD AUTO: 43.2 % (ref 37.5–51)
HDLC SERPL-MCNC: 31 MG/DL
HGB BLD-MCNC: 14.1 G/DL (ref 13–17.7)
IMM GRANULOCYTES # BLD AUTO: 0.1 X10E3/UL (ref 0–0.1)
IMM GRANULOCYTES NFR BLD AUTO: 1 %
IMP & REVIEW OF LAB RESULTS: NORMAL
INTERPRETATION: NORMAL
LDLC SERPL CALC-MCNC: 80 MG/DL (ref 0–99)
LYMPHOCYTES # BLD AUTO: 3 X10E3/UL (ref 0.7–3.1)
LYMPHOCYTES NFR BLD AUTO: 32 %
MCH RBC QN AUTO: 28 PG (ref 26.6–33)
MCHC RBC AUTO-ENTMCNC: 32.6 G/DL (ref 31.5–35.7)
MCV RBC AUTO: 86 FL (ref 79–97)
MICROALBUMIN UR-MCNC: <3 UG/ML
MONOCYTES # BLD AUTO: 0.9 X10E3/UL (ref 0.1–0.9)
MONOCYTES NFR BLD AUTO: 10 %
NEUTROPHILS # BLD AUTO: 5.1 X10E3/UL (ref 1.4–7)
NEUTROPHILS NFR BLD AUTO: 53 %
PLATELET # BLD AUTO: 211 X10E3/UL (ref 150–450)
POTASSIUM SERPL-SCNC: 4.7 MMOL/L (ref 3.5–5.2)
PROT SERPL-MCNC: 7.1 G/DL (ref 6–8.5)
PSA SERPL-MCNC: 1.6 NG/ML (ref 0–4)
RBC # BLD AUTO: 5.04 X10E6/UL (ref 4.14–5.8)
SODIUM SERPL-SCNC: 143 MMOL/L (ref 134–144)
TRIGL SERPL-MCNC: 274 MG/DL (ref 0–149)
VLDLC SERPL CALC-MCNC: 45 MG/DL (ref 5–40)
WBC # BLD AUTO: 9.4 X10E3/UL (ref 3.4–10.8)

## 2021-09-30 ENCOUNTER — OFFICE VISIT (OUTPATIENT)
Dept: CARDIOLOGY CLINIC | Age: 66
End: 2021-09-30
Payer: MEDICARE

## 2021-09-30 ENCOUNTER — OFFICE VISIT (OUTPATIENT)
Dept: FAMILY MEDICINE CLINIC | Age: 66
End: 2021-09-30

## 2021-09-30 VITALS
OXYGEN SATURATION: 95 % | HEART RATE: 73 BPM | DIASTOLIC BLOOD PRESSURE: 64 MMHG | RESPIRATION RATE: 20 BRPM | SYSTOLIC BLOOD PRESSURE: 112 MMHG

## 2021-09-30 VITALS
TEMPERATURE: 98.6 F | RESPIRATION RATE: 14 BRPM | HEIGHT: 63 IN | HEART RATE: 72 BPM | DIASTOLIC BLOOD PRESSURE: 72 MMHG | SYSTOLIC BLOOD PRESSURE: 110 MMHG | OXYGEN SATURATION: 97 % | HEIGHT: 63 IN | RESPIRATION RATE: 14 BRPM | BODY MASS INDEX: 34.55 KG/M2 | TEMPERATURE: 98.6 F | WEIGHT: 195 LBS | HEART RATE: 72 BPM | SYSTOLIC BLOOD PRESSURE: 110 MMHG | DIASTOLIC BLOOD PRESSURE: 72 MMHG | WEIGHT: 195 LBS | BODY MASS INDEX: 34.55 KG/M2

## 2021-09-30 DIAGNOSIS — Z95.1 S/P CABG X 4: ICD-10-CM

## 2021-09-30 DIAGNOSIS — M25.552 BILATERAL HIP PAIN: ICD-10-CM

## 2021-09-30 DIAGNOSIS — R07.9 CHEST PAIN, UNSPECIFIED TYPE: ICD-10-CM

## 2021-09-30 DIAGNOSIS — E66.9 OBESITY (BMI 30-39.9): ICD-10-CM

## 2021-09-30 DIAGNOSIS — M25.551 BILATERAL HIP PAIN: ICD-10-CM

## 2021-09-30 DIAGNOSIS — I25.10 CAD S/P PERCUTANEOUS CORONARY ANGIOPLASTY: Primary | ICD-10-CM

## 2021-09-30 DIAGNOSIS — Z00.00 MEDICARE ANNUAL WELLNESS VISIT, SUBSEQUENT: Primary | ICD-10-CM

## 2021-09-30 DIAGNOSIS — E78.5 DYSLIPIDEMIA: ICD-10-CM

## 2021-09-30 DIAGNOSIS — E11.22 CONTROLLED TYPE 2 DIABETES MELLITUS WITH CHRONIC KIDNEY DISEASE, WITHOUT LONG-TERM CURRENT USE OF INSULIN, UNSPECIFIED CKD STAGE (HCC): Primary | ICD-10-CM

## 2021-09-30 DIAGNOSIS — I50.22 CHRONIC SYSTOLIC HF (HEART FAILURE) (HCC): ICD-10-CM

## 2021-09-30 DIAGNOSIS — Z98.61 CAD S/P PERCUTANEOUS CORONARY ANGIOPLASTY: Primary | ICD-10-CM

## 2021-09-30 DIAGNOSIS — G47.33 SEVERE OBSTRUCTIVE SLEEP APNEA: ICD-10-CM

## 2021-09-30 DIAGNOSIS — I11.0 HYPERTENSIVE HEART DISEASE WITH CONGESTIVE HEART FAILURE, UNSPECIFIED HEART FAILURE TYPE (HCC): ICD-10-CM

## 2021-09-30 PROCEDURE — 3017F COLORECTAL CA SCREEN DOC REV: CPT | Performed by: FAMILY MEDICINE

## 2021-09-30 PROCEDURE — 1101F PT FALLS ASSESS-DOCD LE1/YR: CPT | Performed by: INTERNAL MEDICINE

## 2021-09-30 PROCEDURE — 93000 ELECTROCARDIOGRAM COMPLETE: CPT | Performed by: INTERNAL MEDICINE

## 2021-09-30 PROCEDURE — G8417 CALC BMI ABV UP PARAM F/U: HCPCS | Performed by: FAMILY MEDICINE

## 2021-09-30 PROCEDURE — G8427 DOCREV CUR MEDS BY ELIG CLIN: HCPCS | Performed by: FAMILY MEDICINE

## 2021-09-30 PROCEDURE — G8510 SCR DEP NEG, NO PLAN REQD: HCPCS | Performed by: INTERNAL MEDICINE

## 2021-09-30 PROCEDURE — 3051F HG A1C>EQUAL 7.0%<8.0%: CPT | Performed by: FAMILY MEDICINE

## 2021-09-30 PROCEDURE — 2022F DILAT RTA XM EVC RTNOPTHY: CPT | Performed by: FAMILY MEDICINE

## 2021-09-30 PROCEDURE — 3017F COLORECTAL CA SCREEN DOC REV: CPT | Performed by: INTERNAL MEDICINE

## 2021-09-30 PROCEDURE — G8536 NO DOC ELDER MAL SCRN: HCPCS | Performed by: FAMILY MEDICINE

## 2021-09-30 PROCEDURE — G8417 CALC BMI ABV UP PARAM F/U: HCPCS | Performed by: INTERNAL MEDICINE

## 2021-09-30 PROCEDURE — G8536 NO DOC ELDER MAL SCRN: HCPCS | Performed by: INTERNAL MEDICINE

## 2021-09-30 PROCEDURE — G8427 DOCREV CUR MEDS BY ELIG CLIN: HCPCS | Performed by: INTERNAL MEDICINE

## 2021-09-30 PROCEDURE — 1101F PT FALLS ASSESS-DOCD LE1/YR: CPT | Performed by: FAMILY MEDICINE

## 2021-09-30 PROCEDURE — G8510 SCR DEP NEG, NO PLAN REQD: HCPCS | Performed by: FAMILY MEDICINE

## 2021-09-30 PROCEDURE — 99214 OFFICE O/P EST MOD 30 MIN: CPT | Performed by: FAMILY MEDICINE

## 2021-09-30 PROCEDURE — 99214 OFFICE O/P EST MOD 30 MIN: CPT | Performed by: INTERNAL MEDICINE

## 2021-09-30 PROCEDURE — G0439 PPPS, SUBSEQ VISIT: HCPCS | Performed by: FAMILY MEDICINE

## 2021-09-30 NOTE — PROGRESS NOTES
SUBJECTIVE  Chief Complaint   Patient presents with    Results    Diabetes      Here for routine follow-up. Has a new issue - bilateral hip pain with walking. He says it happens after about 100m of walking. He says he also has morning time stiffness and pain. He is taking glyburide. Says he has been eating poor. Had a diabetic eye exam within past 2 years he reports. Denies any visual changes. Does not report any polyuria or polydipsia. He is currently doing well from a cardiac standpoint without any cardiac chest pain or dyspnea. He has been compliant with Plavix. He is tolerating his statin without myalgias. He is also on a diuretic. He has sleep apnea but cannot wear CPAP because he said it suffocates him. OBJECTIVE    Blood pressure 110/72, pulse 72, temperature 98.6 °F (37 °C), temperature source Temporal, resp. rate 14, height 5' 3\" (1.6 m), weight 195 lb (88.5 kg), SpO2 97 %. General:  alert, cooperative, well appearing, in no apparent distress. Heart: Normal S1S2, RRR. Chest: Clear, no w/r/r. Neuro:  Monofilament testing is intact. No motor or sensory deficits. Foot exam  was performed. No deformities. Sensory and motor testing was assessed - intact. Pedal pulse(s) were assessed - intact. No pedal lesions. Psych: normal affect. Mood good. Oriented x 3. Judgement and insight intact. Results for orders placed or performed in visit on 09/16/21   CBC WITH AUTOMATED DIFF   Result Value Ref Range    WBC 9.4 3.4 - 10.8 x10E3/uL    RBC 5.04 4.14 - 5.80 x10E6/uL    HGB 14.1 13.0 - 17.7 g/dL    HCT 43.2 37.5 - 51.0 %    MCV 86 79 - 97 fL    MCH 28.0 26.6 - 33.0 pg    MCHC 32.6 31.5 - 35.7 g/dL    RDW 14.0 11.6 - 15.4 %    PLATELET 924 243 - 197 x10E3/uL    NEUTROPHILS 53 Not Estab. %    Lymphocytes 32 Not Estab. %    MONOCYTES 10 Not Estab. %    EOSINOPHILS 3 Not Estab. %    BASOPHILS 1 Not Estab. %    ABS.  NEUTROPHILS 5.1 1.4 - 7.0 x10E3/uL    Abs Lymphocytes 3.0 0.7 - 3.1 x10E3/uL    ABS. MONOCYTES 0.9 0.1 - 0.9 x10E3/uL    ABS. EOSINOPHILS 0.3 0.0 - 0.4 x10E3/uL    ABS. BASOPHILS 0.1 0.0 - 0.2 x10E3/uL    IMMATURE GRANULOCYTES 1 Not Estab. %    ABS. IMM. GRANS. 0.1 0.0 - 0.1 [id number]   METABOLIC PANEL, COMPREHENSIVE   Result Value Ref Range    Glucose 125 (H) 65 - 99 mg/dL    BUN 24 8 - 27 mg/dL    Creatinine 1.40 (H) 0.76 - 1.27 mg/dL    GFR est non-AA 52 (L) >59 mL/min/1.73    GFR est AA 61 >59 mL/min/1.73    BUN/Creatinine ratio 17 10 - 24    Sodium 143 134 - 144 mmol/L    Potassium 4.7 3.5 - 5.2 mmol/L    Chloride 105 96 - 106 mmol/L    CO2 25 20 - 29 mmol/L    Calcium 9.2 8.6 - 10.2 mg/dL    Protein, total 7.1 6.0 - 8.5 g/dL    Albumin 4.4 3.8 - 4.8 g/dL    GLOBULIN, TOTAL 2.7 1.5 - 4.5 g/dL    A-G Ratio 1.6 1.2 - 2.2    Bilirubin, total 0.3 0.0 - 1.2 mg/dL    Alk. phosphatase 39 (L) 44 - 121 IU/L    AST (SGOT) 16 0 - 40 IU/L    ALT (SGPT) 20 0 - 44 IU/L   LIPID PANEL   Result Value Ref Range    Cholesterol, total 156 100 - 199 mg/dL    Triglyceride 274 (H) 0 - 149 mg/dL    HDL Cholesterol 31 (L) >39 mg/dL    VLDL, calculated 45 (H) 5 - 40 mg/dL    LDL, calculated 80 0 - 99 mg/dL   MICROALBUMIN, UR, RAND W/ MICROALB/CREAT RATIO   Result Value Ref Range    Creatinine, urine 45.2 Not Estab. mg/dL    Microalbumin, urine <3.0 Not Estab. ug/mL    Microalb/Creat ratio (ug/mg creat.) <7 0 - 29 mg/g creat   CVD REPORT   Result Value Ref Range    INTERPRETATION Note    CKD REPORT   Result Value Ref Range    Interpretation Note    HEMOGLOBIN A1C WITH EAG   Result Value Ref Range    Hemoglobin A1c 7.4 (H) 4.8 - 5.6 %    Estimated average glucose 166 mg/dL   PSA SCREENING (SCREENING)   Result Value Ref Range    Prostate Specific Ag 1.6 0.0 - 4.0 ng/mL     ASSESSMENT / PLAN    ICD-10-CM ICD-9-CM    1. Controlled type 2 diabetes mellitus with chronic kidney disease, without long-term current use of insulin, unspecified CKD stage (HCC)  E11.22 250.40      585.9    2.  Hypertensive heart disease with congestive heart failure, unspecified heart failure type (HCC)  I11.0 402.91      428.0    3. Chronic systolic HF (heart failure) (HCC)  I50.22 428.22    4. S/P CABG x 4  Z95.1 V45.81 ANKLE BRACHIAL INDEX   5. Dyslipidemia  E78.5 272.4    6. Severe obstructive sleep apnea  G47.33 327.23    7. Obesity (BMI 30-39. 9)  E66.9 278.00    8. Bilateral hip pain  M25.551 719.45 XR HIPS BI W OR WO AP PELV    M25.552  ANKLE BRACHIAL INDEX     Labs reviewed. Diabetes -  A1c shows an increasing value and now uncontrolled. I have counseled him on diet lower in carbs. Cont glyburide. Advised regular foot checks and annual eye exams. Cont annual eye exams. CAD / chronic CHF (compensated) / hypertensive heart disease / dyslipidemia - under care of cardiology. Latest notes reviewed. Continue current care. Sleep apnea - cont per sleep specialist.    Obesity - Diabetic dieting. Hip pain - x-rays and ABIs. All chart history elements were reviewed by me at the time of the visit even though marked at time of note closure. Patient understands our medical plan. Patient has provided input and agrees with goals. Alternatives have been explained and offered. All questions answered. The patient is to call if condition worsens or fails to improve. Follow-up and Dispositions    · Return in about 6 months (around 3/30/2022) for routine care. A1c to be done in office.

## 2021-09-30 NOTE — PROGRESS NOTES
1. \"Have you been to the ER, urgent care clinic since your last visit? Hospitalized since your last visit? \" No    2. \"Have you seen or consulted any other health care providers outside of the [address] [place] [address] since your last visit? \" No     3. For patients aged 39-70: Has the patient had a colonoscopy? Yes, HM satisfied with blue hyperlink         Patient declines flu vaccine today.

## 2021-09-30 NOTE — PROGRESS NOTES
Chief Complaint   Patient presents with    Annual Wellness Visit     This is a subsequent Annual Wellness Exam (AWV)    I have reviewed the patient's medical history in detail and updated the computerized patient record. History     Past Medical History:   Diagnosis Date    BPPV (benign paroxysmal positional vertigo) 05/26/2010    CABG x 4     CAD (coronary artery disease), native coronary artery     cath (jan 2014): 40% LM, pLAD 85%, dLAD 70%, dLCx 80%, mRCA 95%. s/p CABG x 4 (LIMA->D, SVG->dLAD, Seq SVG -> RPDA & OM2)    Cardiac catheterization 01/10/2014    pLM 40%. pLAD 85%. dCX 80%. mRCA 95%. CABG recommended.  Cardiac echocardiogram 09/03/2014    Tech difficult. EF 40-45%. Mild, diffuse hypk. Mild LVH. Indeterminate diastolic fx. Mild LAE. No significant valvular heart disease.  Carotid duplex 01/13/2014    Mod 50-69% VALENTIN stenosis. Mild 0-84% LICA stenosis. Occluded right vertebral.       Carpal tunnel syndrome     Cerebrovascular disease     Moderate VALENTIN, mild LICA, occluded R vertebral (Doppler Jan 2014)    Chest pain, musculoskeletal     post CABG    Diabetes ([address]     Dyslipidemia     H/O tobacco use, presenting hazards to health     quit 2014    Headache(784.0)     cluster    Heart attack ([address] 2014    Heart attack ([address] 2015    Hiatal hernia     Hypercholesterolemia     Hypertensive heart disease with congestive heart failure ([address]     Internal carotid artery stenosis 5/26/2010    Obesity (BMI 30-39. 9)     S/P colonoscopic polypectomy 09/2017    tubular adenoma, f/u 5 years    Sleep apnea     does not use cpap machine    Stroke ([address] 5/22/09    TIA (no residuals)    Systolic HF (heart failure) (HCC)     LV EF 35% (echo Jan 2014).  Mean LA pressure 32 (Jan 2014)    Vertigo       Past Surgical History:   Procedure Laterality Date    COLONOSCOPY N/A 9/7/2017    COLONOSCOPY with polypectomy performed by Brice Claude, MD at [place] [address] [facility] GRAFT  1/14/14    LIMA to the D1, and GSV to the distal LAD, and anotherGSV sequentially to the PDA of the RCA and to theOM2 and ascending aortic endarterectomy, Dr. Katia Vaca, 1/14/14.  HX CORONARY STENT PLACEMENT      HX UROLOGICAL      repair of spermatocele     Current Outpatient Medications   Medication Sig Dispense Refill    glyBURIDE (DIABETA) 2.5 mg tablet TAKE 1 TABLET BY MOUTH ONCE DAILY BEFORE BREAKFAST 90 Tablet 0    carvediloL (COREG) 25 mg tablet TAKE 1 TABLET BY MOUTH TWICE DAILY WITH MEALS 180 Tablet 3    rosuvastatin (CRESTOR) 20 mg tablet TAKE 1 TABLET BY MOUTH ONCE DAILY 90 Tablet 3    potassium chloride SR (KLOR-CON 10) 10 mEq tablet TAKE 1 TABLET BY MOUTH ONCE DAILY WITH  LASIX 90 Tab 3    lisinopriL (PRINIVIL, ZESTRIL) 5 mg tablet Take 1 tablet by mouth once daily 90 Tab 3    furosemide (LASIX) 40 mg tablet Take 1 tablet by mouth once daily 90 Tab 3    clopidogreL (PLAVIX) 75 mg tab Take 1 tablet by mouth once daily 90 Tab 3    aspirin delayed-release 81 mg tablet Take 81 mg by mouth daily.  nitroglycerin (NITROSTAT) 0.4 mg SL tablet 1 Tab by SubLINGual route every five (5) minutes as needed for Chest Pain. 1 Bottle 0    cpap machine kit by Does Not Apply route. Overnight CPAP at 16 CWP with ramp and humidifier. Mask: Simplus FF small or mask of choice. Supplies 99 month. Please send compliance data [id number]. Diagnosis DAYANA.  AHI 52 RDI 52 (Patient not taking: Reported on 9/30/2021) 1 Kit 0     Allergies   Allergen Reactions    Atorvastatin Myalgia     High dose    Peanut Diarrhea and Nausea and Vomiting    Acetaminophen Other (comments)     hallucinations    Dimethicone Rash    Percocet [Oxycodone-Acetaminophen] Other (comments)     hallucinations     Family History   Problem Relation Age of Onset    Cancer Father         prostate    Heart Disease Father     Heart Disease Brother         age 48    Other Son         Brain damage    Alcohol abuse Mother dementia    Heart Attack Brother      Social History     Tobacco Use    Smoking status: Current Some Day Smoker     Packs/day: 0.25     Years: 10.00     Pack years: 2.50     Types: Cigarettes     Last attempt to quit: 2014     Years since quittin.7    Smokeless tobacco: Never Used    Tobacco comment:  less then 5 cig a week   Substance Use Topics    Alcohol use: Yes     Comment: beer occasionally     Patient Active Problem List   Diagnosis Code    S/P CABG x 4 Z95.1    Cerebrovascular disease I67.9    Dyslipidemia E78.5    CAD S/P percutaneous coronary angioplasty I25.10, Z98.61    Chest pain, musculoskeletal R07.89    Obesity (BMI 30-39. 9) E66.9    H/O tobacco use, presenting hazards to health Z87.891    Carpal tunnel syndrome on both sides G56.03    Severe obstructive sleep apnea G47.33    Advance care planning Z71.89    Inflamed sebaceous cyst L72.3       Depression Risk Factor Screening:     3 most recent PHQ Screens 2021   Little interest or pleasure in doing things Not at all   Feeling down, depressed, irritable, or hopeless Not at all   Total Score PHQ 2 0     Alcohol Risk Factor Screening: You do not drink alcohol or very rarely. Functional Ability and Level of Safety:     Hearing Loss  Self reports that hearing is good. Activities of Daily Living  The home contains: no safety equipment. Patient does total self care    Fall Risk  Fall Risk Assessment, last 12 mths 2021   Able to walk? Yes   Fall in past 12 months? 0   Do you feel unsteady?  0   Are you worried about falling 0       Abuse Screen  Patient is not abused    Cognitive Screening   Evaluation of Cognitive Function:  Has your family/caregiver stated any concerns about your memory: no      Patient Care Team   Patient Care Team:  Gudelia Chan MD as PCP - General (Family Medicine)  Gudelia Chan MD as PCP - [facility] Empaneled Provider  Lainey Lopez MD (Inactive) (Orthopedic Surgery)  Moisés Zhang MD (Neurology)  Alissa Heredia MD (Pulmonary Disease)  Rina Cowan MD (Ophthalmology)  Patty Rhodes MD (Cardiology)  Aracelis Barraza MD as Surgeon (General Surgery)  Kenyatta Wang DO as Physician (Cardiology)    Assessment/Plan   Education and counseling provided:  Are appropriate based on today's review and evaluation    ICD-10-CM ICD-9-CM    1. Medicare annual wellness visit, subsequent  Z00.00 V70.0      Age and sex specific counseling. Screening for depression and alcoholism. Advanced directives / end of life planning discussion. Care team updated. Declines vaccines today. Medicare recommended screening and prevention test guidelines are filled out, reviewed, and provided to patient. Patient understands our medical plan. Patient has provided input and agrees with goals. All questions answered.

## 2021-09-30 NOTE — PROGRESS NOTES
Cisco Cook presents today for   Chief Complaint   Patient presents with    Coronary Artery Disease       Cisco Cook preferred language for health care discussion is english/other. Is someone accompanying this pt? no    Is the patient using any DME equipment during [address]? no    Depression Screening:  3 most recent PHQ Screens 9/30/2021   Little interest or pleasure in doing things Not at all   Feeling down, depressed, irritable, or hopeless Not at all   Total Score PHQ 2 0       Learning Assessment:  Learning Assessment 8/12/2014   PRIMARY LEARNER Patient   HIGHEST LEVEL OF EDUCATION - PRIMARY LEARNER  -   BARRIERS PRIMARY LEARNER -   [facility]    NEED -   LEARNER PREFERENCE PRIMARY DEMONSTRATION   ANSWERED BY patient   RELATIONSHIP SELF       Abuse Screening:  Abuse Screening Questionnaire 3/29/2021   Do you ever feel afraid of your partner? N   Are you in a relationship with someone who physically or mentally threatens you? N   Is it safe for you to go home? Y       Fall Risk  Fall Risk Assessment, last 12 mths 9/30/2021   Able to walk? Yes   Fall in past 12 months? 0   Do you feel unsteady? 0   Are you worried about falling 0       Pt currently taking Anticoagulant therapy? Yes; plavix , aspirin    Coordination of Care:  1. Have you been to the ER, urgent care clinic since your last visit? Hospitalized since your last visit? No ; no    2. Have you seen or consulted any other health care providers outside of the [address] [place] [address] since your last visit? Include any pap smears or colon screening.  No

## 2021-09-30 NOTE — PROGRESS NOTES
Chief Complaint   Patient presents with   [employer] Annual Wellness Visit     3 most recent [facility] Screens 9/30/2021   Little interest or pleasure in doing things Not at all   Feeling down, depressed, irritable, or hopeless Not at all   Total Score PHQ 2 0     Abuse Screening Questionnaire 9/30/2021   Do you ever feel afraid of your partner? N   Are you in a relationship with someone who physically or mentally threatens you? N   Is it safe for you to go home? Y     Fall Risk Assessment, last 12 mths 9/30/2021   Able to walk? Yes   Fall in past 12 months? 0   Do you feel unsteady? 0   Are you worried about falling 0       1. \"Have you been to the ER, urgent care clinic since your last visit? Hospitalized since your last visit? \" No    2. \"Have you seen or consulted any other health care providers outside of the [address] [place] [address] since your last visit? \" No     3. For patients aged 39-70: Has the patient had a colonoscopy?  Yes,  satisfied with blue hyperlink

## 2021-09-30 NOTE — PATIENT INSTRUCTIONS
Medicare Wellness Visit, Male    The best way to live healthy is to have a lifestyle where you eat a well-balanced diet, exercise regularly, limit alcohol use, and quit all forms of tobacco/nicotine, if applicable. Regular preventive services are another way to keep healthy. Preventive services (vaccines, screening tests, monitoring & exams) can help personalize your care plan, which helps you manage your own care. Screening tests can find health problems at the earliest stages, when they are easiest to treat. [facility] follows the current, evidence-based guidelines published by the [facility] ([facility]) when recommending preventive services for our patients. Because we follow these guidelines, sometimes recommendations change over time as research supports it. (For example, a prostate screening blood test is no longer routinely recommended for men with no symptoms). Of course, you and your doctor may decide to screen more often for some diseases, based on your risk and co-morbidities (chronic disease you are already diagnosed with). Preventive services for you include:  - Medicare offers their members a free annual wellness visit, which is time for you and your primary care provider to discuss and plan for your preventive service needs. Take advantage of this benefit every year!  -All adults over age 72 should receive the recommended pneumonia vaccines. Current USPSTF guidelines recommend a series of two vaccines for the best pneumonia protection.   -All adults should have a flu vaccine yearly and tetanus vaccine every 10 years.  -All adults age 48 and older should receive the shingles vaccines (series of two vaccines).        -All adults age 38-68 who are overweight should have a diabetes screening test once every three years.   -Other screening tests & preventive services for persons with diabetes include: an eye exam to screen for diabetic retinopathy, a kidney function test, a foot exam, and stricter control over your cholesterol.   -Cardiovascular screening for adults with routine risk involves an electrocardiogram (ECG) at intervals determined by the provider.   -Colorectal cancer screening should be done for adults age 54-65 with no increased risk factors for colorectal cancer. There are a number of acceptable methods of screening for this type of cancer. Each test has its own benefits and drawbacks. Discuss with your provider what is most appropriate for you during your annual wellness visit. The different tests include: colonoscopy (considered the best screening method), a fecal occult blood test, a fecal DNA test, and sigmoidoscopy.  -All adults born between [facility] should be screened once for Hepatitis C.  -An Abdominal Aortic Aneurysm (AAA) Screening is recommended for men age 73-68 who has ever smoked in their lifetime.      Here is a list of your current Health Maintenance items (your personalized list of preventive services) with a due date:  Health Maintenance Due   Topic Date Due    Pneumococcal Vaccine (1 of 1 - PPSV23) 12/10/2020    Yearly Flu Vaccine (1) 09/01/2021

## 2021-09-30 NOTE — PROGRESS NOTES
Leonard Murry    Chief Complaint   Patient presents with    Coronary Artery Disease       HPI    Leonard Murry is a 72 y.o. with coronary disease here for routine follow-up evaluation. He has no complaints today no chest pain no shortness of breath. He does have concern about bilateral \"hips locking up\" and thinks it could be from one of his medicines. He takes his disabled son to [facility] quite regularly, does water aerobics at the [facility], and has no CP. He has a hernia and muscles around his sternotomy can feel tight when he does certain exercises. He used to walk with his Beagle daily and said that was a big blow to him having to put that dog down. He has history of known coronary artery disease.  He underwent CABG X four in January of 2014, which consisted of:    1. LIMA to the proximal LAD               2. Single SVG to the distal LAD               3. Sequential SVG to the RPDA and OM2.       He was readmitted with a non-STEMI myocardial infarction in January of 2015 and underwent cardiac catheterization, which demonstrated:    1. Patent LIMA to the LAD                2. Total occlusion of the SVG to the LAD                3. 95% ostial stenosis of the sequential SVG to the RPDA and obtuse marginal branch 2 with 70% OM just after the anastomosis.        He subsequently underwent successful stenting of the vein graft to the RPDA and OM along with OM branch with a Xience drug-eluting stent.       He has history of hypertension, diabetes mellitus, and dyslipidemia. Mercedes Mena does have history of metabolic syndrome with high triglycerides and low HDL along with diabetes and hypertension.       His echocardiogram in March of 2014 demonstrated mild to moderate LV dysfunction with EF in the 40-45% range. Ephriam Valiente was no significant valvular dysfunction.  There was no evidence of significant pulmonary hypertension.       Past Medical History:   Diagnosis Date    BPPV (benign paroxysmal positional vertigo) 05/26/2010    CABG x 4     CAD (coronary artery disease), native coronary artery     cath (jan 2014): 40% LM, pLAD 85%, dLAD 70%, dLCx 80%, mRCA 95%. s/p CABG x 4 (LIMA->D, SVG->dLAD, Seq SVG -> RPDA & OM2)    Cardiac catheterization 01/10/2014    pLM 40%. pLAD 85%. dCX 80%. mRCA 95%. CABG recommended.  Cardiac echocardiogram 09/03/2014    Tech difficult. EF 40-45%. Mild, diffuse hypk. Mild LVH. Indeterminate diastolic fx. Mild LAE. No significant valvular heart disease.  Carotid duplex 01/13/2014    Mod 50-69% VALENTIN stenosis. Mild 3-53% LICA stenosis. Occluded right vertebral.       Carpal tunnel syndrome     Cerebrovascular disease     Moderate VALENTIN, mild LICA, occluded R vertebral (Doppler Jan 2014)    Chest pain, musculoskeletal     post CABG    Diabetes ([address]     Dyslipidemia     H/O tobacco use, presenting hazards to health     quit 2014    Headache(784.0)     cluster    Heart attack ([address] 2014    Heart attack ([address] 2015    Hiatal hernia     Hypercholesterolemia     Hypertensive heart disease with congestive heart failure ([address]     Internal carotid artery stenosis 5/26/2010    Obesity (BMI 30-39. 9)     S/P colonoscopic polypectomy 09/2017    tubular adenoma, f/u 5 years    Sleep apnea     does not use cpap machine    Stroke ([address] 5/22/09    TIA (no residuals)    Systolic HF (heart failure) (HCC)     LV EF 35% (echo Jan 2014). Mean LA pressure 32 (Jan 2014)    Vertigo        Past Surgical History:   Procedure Laterality Date    COLONOSCOPY N/A 9/7/2017    COLONOSCOPY with polypectomy performed by Jae Hernandez MD at [facility]  1/14/14    LIMA to the D1, and GSV to the distal LAD, and anotherGSV sequentially to the PDA of the RCA and to theOM2 and ascending aortic endarterectomy, Dr. Emanuel Elena, 1/14/14.     HX CORONARY STENT PLACEMENT      HX UROLOGICAL      repair of spermatocele       Current Outpatient Medications   Medication Sig Dispense Refill    glyBURIDE (DIABETA) 2.5 mg tablet TAKE 1 TABLET BY MOUTH ONCE DAILY BEFORE BREAKFAST 90 Tablet 0    carvediloL (COREG) 25 mg tablet TAKE 1 TABLET BY MOUTH TWICE DAILY WITH MEALS 180 Tablet 3    rosuvastatin (CRESTOR) 20 mg tablet TAKE 1 TABLET BY MOUTH ONCE DAILY 90 Tablet 3    potassium chloride SR (KLOR-CON 10) 10 mEq tablet TAKE 1 TABLET BY MOUTH ONCE DAILY WITH  LASIX 90 Tab 3    lisinopriL (PRINIVIL, ZESTRIL) 5 mg tablet Take 1 tablet by mouth once daily 90 Tab 3    furosemide (LASIX) 40 mg tablet Take 1 tablet by mouth once daily 90 Tab 3    clopidogreL (PLAVIX) 75 mg tab Take 1 tablet by mouth once daily 90 Tab 3    aspirin delayed-release 81 mg tablet Take 81 mg by mouth daily.  nitroglycerin (NITROSTAT) 0.4 mg SL tablet 1 Tab by SubLINGual route every five (5) minutes as needed for Chest Pain. 1 Bottle 0    cpap machine kit by Does Not Apply route. Overnight CPAP at 16 CWP with ramp and humidifier. Mask: Simplus FF small or mask of choice. Supplies 99 month. Please send compliance data [id number]. Diagnosis DAYANA.  AHI 52 RDI 52 (Patient not taking: Reported on 2021) 1 Kit 0       Allergies   Allergen Reactions    Atorvastatin Myalgia     High dose    Peanut Diarrhea and Nausea and Vomiting    Acetaminophen Other (comments)     hallucinations    Dimethicone Rash    Percocet [Oxycodone-Acetaminophen] Other (comments)     hallucinations       Social History     Socioeconomic History    Marital status:      Spouse name: Not on file    Number of children: Not on file    Years of education: Not on file    Highest education level: Not on file   Occupational History    Occupation: retired   Tobacco Use    Smoking status: Current Some Day Smoker     Packs/day: 0.25     Years: 10.00     Pack years: 2.50     Types: Cigarettes     Last attempt to quit: 2014     Years since quittin.7    Smokeless tobacco: Never Used    Tobacco comment:  less then 5 cig a week   Substance and Sexual Activity    Alcohol use: Yes     Comment: beer occasionally    Drug use: No    Sexual activity: Not Currently     Partners: Female   Other Topics Concern    Not on file   Social History Narrative    Not on file     Social Determinants of Health     Financial Resource Strain:     Difficulty of Paying Living Expenses:    Food Insecurity:     Worried About Running Out of Food in the Last Year:     [address] in the Last Year:    Transportation Needs:     Lack of Transportation (Medical):  Lack of Transportation (Non-Medical):    Physical Activity:     Days of Exercise per Week:     Minutes of Exercise per Session:    Stress:     Feeling of Stress :    Social Connections:     Frequency of Communication with Friends and Family:     Frequency of Social Gatherings with Friends and Family:     Attends [employer] Services:     Active Member of Clubs or Organizations:     Attends Club or Organization Meetings:     Marital Status:    Intimate Partner Violence:     Fear of Current or Ex-Partner:     Emotionally Abused:     Physically Abused:     Sexually Abused:     active, playing golf, has granddaughter    FH + father CABG and 9 stents but still alive at 80    Review of Systems    14 pt Review of Systems is negative unless otherwise mentioned in the HPI.     Wt Readings from Last 3 Encounters:   03/29/21 87.1 kg (192 lb)   09/14/20 84.8 kg (187 lb)   09/14/20 84.8 kg (187 lb)     Temp Readings from Last 3 Encounters:   03/29/21 97.8 °F (36.6 °C) (Oral)   09/14/20 98.3 °F (36.8 °C) (Oral)   09/14/20 98.3 °F (36.8 °C) (Oral)     BP Readings from Last 3 Encounters:   09/30/21 112/64   03/29/21 112/64   09/14/20 110/74     Pulse Readings from Last 3 Encounters:   09/30/21 73   03/29/21 84   09/14/20 81       Physical Exam:    Visit Vitals  /64 (BP 1 Location: Left upper arm, BP Patient Position: Sitting, BP Cuff Size: Adult)   Pulse 73   Resp 20   SpO2 95% Physical Exam  HENT:      Head: Normocephalic and atraumatic. Eyes:      General: No scleral icterus. Pupils: Pupils are equal, round, and reactive to light. Cardiovascular:      Rate and Rhythm: Normal rate and regular rhythm. Heart sounds: Normal heart sounds. No murmur heard. No friction rub. No gallop. Pulmonary:      Effort: Pulmonary effort is normal. No respiratory distress. Breath sounds: Normal breath sounds. No wheezing or rales. Chest:      Chest wall: No tenderness. Abdominal:      General: Bowel sounds are normal.      Palpations: Abdomen is soft. Skin:     General: Skin is warm and dry. Findings: No rash. Neurological:      Mental Status: He is alert and oriented to person, place, and time. EKG today shows: NSR, normal axis and intervals, no ST segment abnormalities    Lab Results   Component Value Date/Time    Cholesterol, total 156 09/16/2021 11:07 AM    HDL Cholesterol 31 (L) 09/16/2021 11:07 AM    LDL, calculated 80 09/16/2021 11:07 AM    LDL, calculated 63 11/05/2019 11:24 AM    VLDL, calculated 45 (H) 09/16/2021 11:07 AM    VLDL, calculated 55 (H) 11/05/2019 11:24 AM    Triglyceride 274 (H) 09/16/2021 11:07 AM    CHOL/HDL Ratio 7.0 (H) 11/27/2015 09:30 AM     Lab Results   Component Value Date/Time    Hemoglobin A1c 7.4 (H) 09/16/2021 11:07 AM    Hemoglobin A1c (POC) 6.9 03/29/2021 09:50 AM     Stress test 2017:  CONCLUSIONS  1. Abnormal and intermediate risk pharmacological nuclear stress test.  2. Small to medium sized, dense, fixed anterolateral perfusion defect  consistent with prior myocardial infarction. 3. No reversible perfusion image abnormalities significant for  myocardial ischemia. 4. Normal left ventricular size, mildly to moderately depressed left  ventricular systolic function, ejection fraction calculated at 40%.     Impression and Plan:  Shawn Lu is a 72 y.o. with:    1.) CAD s/p CABG 2014, neg nuc 2017  2.) Normal LV function  3.) DM2 with dyslipidemia  4.) HTN  5.) Metabolic syndrome  6.) DAYANA, on CPAP    1.) Cont med therapy  2.) Agree with continued DAPT  3.) No further recs, doesn't need stress test at this time  4.) Mentions b/l hips \"locking up\" sounds more like arthritis and doubt coming from statin but offered trying to stop statin (but would then need to pursue Repatha which he did not want to do). Asked him to please dw his PCP    Really happy about his activity level/ exercise but needs to improve his diet with less simple sugars/ carbs    Thank you for allowing me to participate in the care of your patient, please do not hesitate to call with questions or concerns. Follow-up and Dispositions    · Return in about 1 year (around 9/30/2022).          Kenia Hinkle, DO

## 2021-11-10 DIAGNOSIS — E11.9 CONTROLLED TYPE 2 DIABETES MELLITUS WITHOUT COMPLICATION, WITHOUT LONG-TERM CURRENT USE OF INSULIN (HCC): ICD-10-CM

## 2021-11-11 RX ORDER — GLYBURIDE 2.5 MG/1
TABLET ORAL
Qty: 90 TABLET | Refills: 1 | Status: SHIPPED | OUTPATIENT
Start: 2021-11-11 | End: 2022-06-18

## 2021-11-11 NOTE — TELEPHONE ENCOUNTER
This pharmacy faxed over request for the following prescriptions to be filled:    Medication requested :   Requested Prescriptions     Pending Prescriptions Disp Refills    glyBURIDE (DIABETA) 2.5 mg tablet [Pharmacy Med Name: glyBURIDE 2.5 MG Oral Tablet] 90 Tablet 0     Sig: TAKE 1 TABLET BY MOUTH ONCE DAILY BEFORE BREAKFAST     PCP: Susie Encarnacion 39. or Print: [facility]  Mail order or Local pharmacy [facility] [address]     Scheduled appointment if not seen by current providers in office: [address] [place] [address] 9/30/2021 f/u 3/31/2021

## 2022-03-09 RX ORDER — LISINOPRIL 5 MG/1
TABLET ORAL
Qty: 90 TABLET | Refills: 3 | Status: SHIPPED | OUTPATIENT
Start: 2022-03-09

## 2022-03-19 PROBLEM — L72.3 INFLAMED SEBACEOUS CYST: Status: ACTIVE | Noted: 2020-08-24

## 2022-03-31 ENCOUNTER — OFFICE VISIT (OUTPATIENT)
Dept: FAMILY MEDICINE CLINIC | Age: 67
End: 2022-03-31
Payer: MEDICARE

## 2022-03-31 VITALS
SYSTOLIC BLOOD PRESSURE: 110 MMHG | HEART RATE: 78 BPM | DIASTOLIC BLOOD PRESSURE: 60 MMHG | RESPIRATION RATE: 16 BRPM | BODY MASS INDEX: 33.59 KG/M2 | HEIGHT: 63 IN | WEIGHT: 189.6 LBS | TEMPERATURE: 97.8 F | OXYGEN SATURATION: 95 %

## 2022-03-31 DIAGNOSIS — I50.22 CHRONIC SYSTOLIC HF (HEART FAILURE) (HCC): ICD-10-CM

## 2022-03-31 DIAGNOSIS — Z95.1 S/P CABG X 4: ICD-10-CM

## 2022-03-31 DIAGNOSIS — F17.200 SMOKER: ICD-10-CM

## 2022-03-31 DIAGNOSIS — E66.9 OBESITY (BMI 30-39.9): ICD-10-CM

## 2022-03-31 DIAGNOSIS — Z12.5 PROSTATE CANCER SCREENING: ICD-10-CM

## 2022-03-31 DIAGNOSIS — E78.5 DYSLIPIDEMIA: ICD-10-CM

## 2022-03-31 DIAGNOSIS — I11.0 HYPERTENSIVE HEART DISEASE WITH CONGESTIVE HEART FAILURE, UNSPECIFIED HEART FAILURE TYPE (HCC): ICD-10-CM

## 2022-03-31 DIAGNOSIS — E11.22 CONTROLLED TYPE 2 DIABETES MELLITUS WITH CHRONIC KIDNEY DISEASE, WITHOUT LONG-TERM CURRENT USE OF INSULIN, UNSPECIFIED CKD STAGE (HCC): Primary | ICD-10-CM

## 2022-03-31 DIAGNOSIS — G47.33 SEVERE OBSTRUCTIVE SLEEP APNEA: ICD-10-CM

## 2022-03-31 LAB — HBA1C MFR BLD HPLC: 7.1 %

## 2022-03-31 PROCEDURE — G8427 DOCREV CUR MEDS BY ELIG CLIN: HCPCS | Performed by: FAMILY MEDICINE

## 2022-03-31 PROCEDURE — 3051F HG A1C>EQUAL 7.0%<8.0%: CPT | Performed by: FAMILY MEDICINE

## 2022-03-31 PROCEDURE — G8536 NO DOC ELDER MAL SCRN: HCPCS | Performed by: FAMILY MEDICINE

## 2022-03-31 PROCEDURE — 1101F PT FALLS ASSESS-DOCD LE1/YR: CPT | Performed by: FAMILY MEDICINE

## 2022-03-31 PROCEDURE — G8510 SCR DEP NEG, NO PLAN REQD: HCPCS | Performed by: FAMILY MEDICINE

## 2022-03-31 PROCEDURE — 99214 OFFICE O/P EST MOD 30 MIN: CPT | Performed by: FAMILY MEDICINE

## 2022-03-31 PROCEDURE — 83036 HEMOGLOBIN GLYCOSYLATED A1C: CPT | Performed by: FAMILY MEDICINE

## 2022-03-31 PROCEDURE — G8417 CALC BMI ABV UP PARAM F/U: HCPCS | Performed by: FAMILY MEDICINE

## 2022-03-31 PROCEDURE — 2022F DILAT RTA XM EVC RTNOPTHY: CPT | Performed by: FAMILY MEDICINE

## 2022-03-31 PROCEDURE — 3017F COLORECTAL CA SCREEN DOC REV: CPT | Performed by: FAMILY MEDICINE

## 2022-03-31 NOTE — PATIENT INSTRUCTIONS
A Healthy Lifestyle: Care Instructions  Your Care Instructions     A healthy lifestyle can help you feel good, stay at a healthy weight, and have plenty of energy for both work and play. A healthy lifestyle is something you can share with your whole family. A healthy lifestyle also can lower your risk for serious health problems, such as high blood pressure, heart disease, and diabetes. You can follow a few steps listed below to improve your health and the health of your family. Follow-up care is a key part of your treatment and safety. Be sure to make and go to all appointments, and call your doctor if you are having problems. It's also a good idea to know your test results and keep a list of the medicines you take. How can you care for yourself at home? · Do not eat too much sugar, fat, or fast foods. You can still have dessert and treats now and then. The goal is moderation. · Start small to improve your eating habits. Pay attention to portion sizes, drink less juice and soda pop, and eat more fruits and vegetables. ? Eat a healthy amount of food. A 3-ounce serving of meat, for example, is about the size of a deck of cards. Fill the rest of your plate with vegetables and whole grains. ? Limit the amount of soda and sports drinks you have every day. Drink more water when you are thirsty. ? Eat plenty of fruits and vegetables every day. Have an apple or some carrot sticks as an afternoon snack instead of a candy bar. Try to have fruits and/or vegetables at every meal.  · Make exercise part of your daily routine. You may want to start with simple activities, such as walking, bicycling, or slow swimming. Try to be active 30 to 60 minutes every day. You do not need to do all 30 to 60 minutes all at once. For example, you can exercise 3 times a day for 10 or 20 minutes.  Moderate exercise is safe for most people, but it is always a good idea to talk to your doctor before starting an exercise program.  · Keep moving. [employer] the lawn, work in the garden, or [employer]. Take the stairs instead of the elevator at work. · If you smoke, quit. People who smoke have an increased risk for heart attack, stroke, cancer, and other lung illnesses. Quitting is hard, but there are ways to boost your chance of quitting tobacco for good. ? Use nicotine gum, patches, or lozenges. ? Ask your doctor about stop-smoking programs and medicines. ? Keep trying. In addition to reducing your risk of diseases in the future, you will notice some benefits soon after you stop using tobacco. If you have shortness of breath or asthma symptoms, they will likely get better within a few weeks after you quit. · Limit how much alcohol you drink. Moderate amounts of alcohol (up to 2 drinks a day for men, 1 drink a day for women) are okay. But drinking too much can lead to liver problems, high blood pressure, and other health problems. Family health  If you have a family, there are many things you can do together to improve your health. · Eat meals together as a family as often as possible. · Eat healthy foods. This includes fruits, vegetables, lean meats and dairy, and whole grains. · Include your family in your fitness plan. Most people think of activities such as jogging or tennis as the way to fitness, but there are many ways you and your family can be more active. Anything that makes you breathe hard and gets your heart pumping is exercise. Here are some tips:  ? Walk to do errands or to take your child to school or the bus.  ? Go for a family bike ride after dinner instead of watching TV. Where can you learn more? Go to http://www.gray.com/  Enter A325 in the search box to learn more about \"A Healthy Lifestyle: Care Instructions. \"  Current as of: June 16, 2021               Content Version: 13.2  © 0422-8820 Healthwise, Incorporated.    Care instructions adapted under license by [employer] (which disclaims liability or warranty for this information). If you have questions about a medical condition or this instruction, always ask your healthcare professional. [address] any warranty or liability for your use of this information.

## 2022-03-31 NOTE — PROGRESS NOTES
1. \"Have you been to the ER, urgent care clinic since your last visit? Hospitalized since your last visit? \" No    2. \"Have you seen or consulted any other health care providers outside of the [address] [place] [address] since your last visit? \" No      3. For patients aged 39-70: Has the patient had a colonoscopy / FIT/ Cologuard? Last colonoscopy 9/07/2017 due 9/07/22.     Chief Complaint   Patient presents with    Diabetes    CHF    Cholesterol Problem    Sleep Apnea     DAYANA

## 2022-03-31 NOTE — PROGRESS NOTES
SUBJECTIVE  Chief Complaint   Patient presents with    Diabetes    CHF    Cholesterol Problem    Sleep Apnea     DAYANA      Here for routine follow-up. Never pursued hip films or testing. No complaints at this time pertaining to hips. He is taking glyburide. Working now and is on his feet a lot. However his dog passed and doing less walking. Had a diabetic eye exam within past 2 years he reports. Denies any visual changes. Does not report any polyuria or polydipsia. He is currently doing well from a cardiac standpoint without any cardiac chest pain or dyspnea. He has been compliant with Plavix. He is tolerating his statin without myalgias. He is also on a diuretic. He has sleep apnea but still cannot wear CPAP because he said it suffocates him. OBJECTIVE    Blood pressure 110/60, pulse 78, temperature 97.8 °F (36.6 °C), temperature source Temporal, resp. rate 16, height 5' 3\" (1.6 m), weight 189 lb 9.6 oz (86 kg), SpO2 95 %. General:  alert, cooperative, well appearing, in no apparent distress. HEENT: no oral lesions. No neck masses. Heart: Normal S1S2, RRR. Chest: Clear, no w/r/r. Neuro:  Monofilament testing is intact. No motor or sensory deficits. Foot exam  was performed. No deformities. Sensory and motor testing was assessed - intact. Pedal pulse(s) were assessed - intact. No pedal lesions. Monofilament testing is intact. Mild plantar callusing. Psych: normal affect. Mood good. Oriented x 3. Judgement and insight intact. ASSESSMENT / PLAN    ICD-10-CM ICD-9-CM    1. Controlled type 2 diabetes mellitus with chronic kidney disease, without long-term current use of insulin, unspecified CKD stage (HCC)  E11.22 250.40 AMB POC HEMOGLOBIN A1C     585.9 CBC WITH AUTOMATED DIFF      METABOLIC PANEL, COMPREHENSIVE      LIPID PANEL      HEMOGLOBIN A1C W/O EAG      MICROALBUMIN, UR, RAND W/ MICROALB/CREAT RATIO       DIABETES FOOT EXAM   2.  Hypertensive heart disease with congestive heart failure, unspecified heart failure type (HCC)  I11.0 402.91      428.0    3. Chronic systolic HF (heart failure) (HCC)  I50.22 428.22    4. S/P CABG x 4  Z95.1 V45.81    5. Dyslipidemia  E78.5 272.4 LIPID PANEL   6. Obesity (BMI 30-39. 9)  E66.9 278.00    7. Severe obstructive sleep apnea  G47.33 327.23    8. Smoker  F17.200 305.1    9. Prostate cancer screening  Z12.5 V76.44 PSA SCREENING (SCREENING)     Labs reviewed. Diabetes -  A1c shows an increasing value and now uncontrolled. I have counseled him on diet lower in carbs. Cont glyburide. Advised regular foot checks and annual eye exams. Cont annual eye exams. CAD / chronic CHF (compensated) / hypertensive heart disease / dyslipidemia - under care of cardiology. Latest notes reviewed. Continue current care. Obesity - Diabetic dieting. Sleep apnea - cont per sleep specialist.    Smoker - advised on smoking cessation. Declines AAA testing / vaccination. All chart history elements were reviewed by me at the time of the visit even though marked at time of note closure. Patient understands our medical plan. Patient has provided input and agrees with goals. Alternatives have been explained and offered. All questions answered. The patient is to call if condition worsens or fails to improve. Follow-up and Dispositions    · Return in about 6 months (around 9/30/2022) for routine care and Annual Wellness visit. Fasting labs due 3-7 days prior to appointment .

## 2022-06-18 DIAGNOSIS — E11.9 CONTROLLED TYPE 2 DIABETES MELLITUS WITHOUT COMPLICATION, WITHOUT LONG-TERM CURRENT USE OF INSULIN (HCC): ICD-10-CM

## 2022-06-18 RX ORDER — GLYBURIDE 2.5 MG/1
TABLET ORAL
Qty: 90 TABLET | Refills: 0 | Status: SHIPPED | OUTPATIENT
Start: 2022-06-18 | End: 2022-10-21

## 2022-07-26 RX ORDER — POTASSIUM CHLORIDE 750 MG/1
TABLET, FILM COATED, EXTENDED RELEASE ORAL
Qty: 90 TABLET | Refills: 3 | Status: SHIPPED | OUTPATIENT
Start: 2022-07-26

## 2022-08-11 RX ORDER — CARVEDILOL 25 MG/1
TABLET ORAL
Qty: 180 TABLET | Refills: 0 | Status: SHIPPED | OUTPATIENT
Start: 2022-08-11

## 2022-09-21 ENCOUNTER — OFFICE VISIT (OUTPATIENT)
Dept: CARDIOLOGY CLINIC | Age: 67
End: 2022-09-21
Payer: MEDICARE

## 2022-09-21 VITALS
OXYGEN SATURATION: 98 % | SYSTOLIC BLOOD PRESSURE: 120 MMHG | BODY MASS INDEX: 33.49 KG/M2 | HEIGHT: 63 IN | DIASTOLIC BLOOD PRESSURE: 62 MMHG | HEART RATE: 78 BPM | WEIGHT: 189 LBS

## 2022-09-21 DIAGNOSIS — E78.5 DYSLIPIDEMIA: ICD-10-CM

## 2022-09-21 DIAGNOSIS — I25.10 CAD S/P PERCUTANEOUS CORONARY ANGIOPLASTY: Primary | ICD-10-CM

## 2022-09-21 DIAGNOSIS — R07.9 CHEST PAIN, UNSPECIFIED TYPE: ICD-10-CM

## 2022-09-21 DIAGNOSIS — Z98.61 CAD S/P PERCUTANEOUS CORONARY ANGIOPLASTY: Primary | ICD-10-CM

## 2022-09-21 PROCEDURE — G8510 SCR DEP NEG, NO PLAN REQD: HCPCS | Performed by: INTERNAL MEDICINE

## 2022-09-21 PROCEDURE — G8417 CALC BMI ABV UP PARAM F/U: HCPCS | Performed by: INTERNAL MEDICINE

## 2022-09-21 PROCEDURE — G8536 NO DOC ELDER MAL SCRN: HCPCS | Performed by: INTERNAL MEDICINE

## 2022-09-21 PROCEDURE — 3017F COLORECTAL CA SCREEN DOC REV: CPT | Performed by: INTERNAL MEDICINE

## 2022-09-21 PROCEDURE — G8427 DOCREV CUR MEDS BY ELIG CLIN: HCPCS | Performed by: INTERNAL MEDICINE

## 2022-09-21 PROCEDURE — 99214 OFFICE O/P EST MOD 30 MIN: CPT | Performed by: INTERNAL MEDICINE

## 2022-09-21 PROCEDURE — 1123F ACP DISCUSS/DSCN MKR DOCD: CPT | Performed by: INTERNAL MEDICINE

## 2022-09-21 PROCEDURE — 93000 ELECTROCARDIOGRAM COMPLETE: CPT | Performed by: INTERNAL MEDICINE

## 2022-09-21 PROCEDURE — 1101F PT FALLS ASSESS-DOCD LE1/YR: CPT | Performed by: INTERNAL MEDICINE

## 2022-09-21 NOTE — PROGRESS NOTES
Maryanne Jerome    Chief Complaint   Patient presents with    Follow-up     1 year follow up       HPI    Maryanne Jerome is a 77 y.o. with coronary disease here for routine follow-up evaluation. He has no complaints today no chest pain no shortness of breath. He does have concern about bilateral \"hips locking up\" and thinks it could be from one of his medicines. He takes his disabled son to [facility] quite regularly, does water aerobics at the [facility], and has no CP. He has a hernia and muscles around his sternotomy can feel tight when he does certain exercises. He used to walk with his Beagle daily and said that was a big blow to him having to put that dog down. He has history of known coronary artery disease. He underwent CABG X four in January of 2014, which consisted of:    LIMA to the proximal LAD               Single SVG to the distal LAD               Sequential SVG to the RPDA and OM2. He was readmitted with a non-STEMI myocardial infarction in January of 2015 and underwent cardiac catheterization, which demonstrated:    Patent LIMA to the LAD                Total occlusion of the SVG to the LAD                95% ostial stenosis of the sequential SVG to the RPDA and obtuse marginal branch 2 with 70% OM just after the anastomosis. He subsequently underwent successful stenting of the vein graft to the RPDA and OM along with OM branch with a Xience drug-eluting stent. He has history of hypertension, diabetes mellitus, and dyslipidemia. He does have history of metabolic syndrome with high triglycerides and low HDL along with diabetes and hypertension. His echocardiogram in March of 2014 demonstrated mild to moderate LV dysfunction with EF in the 40-45% range. There was no significant valvular dysfunction. There was no evidence of significant pulmonary hypertension.        Past Medical History:   Diagnosis Date    BPPV (benign paroxysmal positional vertigo) 05/26/2010 CABG x 4     CAD (coronary artery disease), native coronary artery     cath (jan 2014): 40% LM, pLAD 85%, dLAD 70%, dLCx 80%, mRCA 95%. s/p CABG x 4 (LIMA->D, SVG->dLAD, Seq SVG -> RPDA & OM2)    Cardiac catheterization 01/10/2014    pLM 40%. pLAD 85%. dCX 80%. mRCA 95%. CABG recommended. Cardiac echocardiogram 09/03/2014    Tech difficult. EF 40-45%. Mild, diffuse hypk. Mild LVH. Indeterminate diastolic fx. Mild LAE. No significant valvular heart disease. Carotid duplex 01/13/2014    Mod 50-69% VALENTIN stenosis. Mild 2-41% LICA stenosis. Occluded right vertebral.       Carpal tunnel syndrome     Cerebrovascular disease     Moderate VALENTIN, mild LICA, occluded R vertebral (Doppler Jan 2014)    Chest pain, musculoskeletal     post CABG    Diabetes ([address]     Dyslipidemia     H/O tobacco use, presenting hazards to health     quit 2014    Headache(784.0)     cluster    Heart attack ([address] 2014    Heart attack ([address] 2015    Hiatal hernia     Hypercholesterolemia     Hypertensive heart disease with congestive heart failure ([address]     Internal carotid artery stenosis 5/26/2010    Obesity (BMI 30-39. 9)     S/P colonoscopic polypectomy 09/2017    tubular adenoma, f/u 5 years    Sleep apnea     does not use cpap machine    Stroke ([address] 5/22/09    TIA (no residuals)    Systolic HF (heart failure) ([address]     LV EF 35% (echo Jan 2014). Mean LA pressure 32 (Jan 2014)    Vertigo        Past Surgical History:   Procedure Laterality Date    COLONOSCOPY N/A 9/7/2017    COLONOSCOPY with polypectomy performed by Anna Valdez MD at [address] [facility]  1/14/14    LIMA to the D1, and GSV to the distal LAD, and anotherGSV sequentially to the PDA of the RCA and to theOM2 and ascending aortic endarterectomy, Dr. Shar Pride, 1/14/14.     HX CORONARY STENT PLACEMENT      HX UROLOGICAL      repair of spermatocele       Current Outpatient Medications   Medication Sig Dispense Refill    rosuvastatin (CRESTOR) 20 mg tablet Take 1 tablet by mouth once daily 90 Tablet 3    carvediloL (COREG) 25 mg tablet TAKE 1 TABLET BY MOUTH TWICE DAILY WITH MEALS 180 Tablet 0    potassium chloride SR (KLOR-CON 10) 10 mEq tablet TAKE 1 TABLET BY MOUTH ONCE DAILY WITH LASIX 90 Tablet 3    glyBURIDE (DIABETA) 2.5 mg tablet TAKE 1 TABLET BY MOUTH ONCE DAILY BEFORE BREAKFAST 90 Tablet 0    lisinopriL (PRINIVIL, ZESTRIL) 5 mg tablet Take 1 tablet by mouth once daily 90 Tablet 3    clopidogreL (PLAVIX) 75 mg tab Take 1 tablet by mouth once daily 90 Tablet 3    furosemide (LASIX) 40 mg tablet Take 1 tablet by mouth once daily 90 Tablet 3    aspirin delayed-release 81 mg tablet Take 81 mg by mouth daily. nitroglycerin (NITROSTAT) 0.4 mg SL tablet 1 Tab by SubLINGual route every five (5) minutes as needed for Chest Pain. 1 Bottle 0    cpap machine kit by Does Not Apply route. Overnight CPAP at 16 CWP with ramp and humidifier. Mask: Simplus FF small or mask of choice. Supplies 99 month. Please send compliance data [id number]. Diagnosis DAYANA. AHI 52 RDI 52 (Patient taking differently: by Does Not Apply route. Overnight CPAP at 16 CWP with ramp and humidifier. Mask: Simplus FF small or mask of choice. Supplies 99 month. Please send compliance data [id number]. Diagnosis DAYANA.  AHI 52 RDI 52) 1 Kit 0       Allergies   Allergen Reactions    Atorvastatin Myalgia     High dose    Food [Peanut] Diarrhea and Nausea and Vomiting    Acetaminophen Other (comments)     hallucinations    Dimethicone Rash    Percocet [Oxycodone-Acetaminophen] Other (comments)     hallucinations       Social History     Socioeconomic History    Marital status:      Spouse name: Not on file    Number of children: Not on file    Years of education: Not on file    Highest education level: Not on file   Occupational History    Occupation: retired   Tobacco Use    Smoking status: Some Days     Packs/day: 0.25     Years: 10.00     Pack years: 2.50     Types: Cigarettes Last attempt to quit: 2014     Years since quittin.7    Smokeless tobacco: Never    Tobacco comments:      less then 5 cig a week   Vaping Use    Vaping Use: Never used   Substance and Sexual Activity    Alcohol use: Yes     Comment: beer occasionally    Drug use: No    Sexual activity: Not Currently     Partners: Female   Other Topics Concern    Not on file   Social History Narrative    Not on file     Social Determinants of Health     Financial Resource Strain: Not on file   Food Insecurity: Not on file   Transportation Needs: Not on file   Physical Activity: Not on file   Stress: Not on file   Social Connections: Not on file   Intimate Partner Violence: Not on file   Housing Stability: Not on file    active, playing golf, has granddaughter    FH + father CABG and 9 stents but still alive at 80    Review of Systems    14 pt Review of Systems is negative unless otherwise mentioned in the HPI. Wt Readings from Last 3 Encounters:   22 85.7 kg (189 lb)   22 86 kg (189 lb 9.6 oz)   21 88.5 kg (195 lb)     Temp Readings from Last 3 Encounters:   22 97.8 °F (36.6 °C) (Temporal)   21 98.6 °F (37 °C) (Temporal)   21 98.6 °F (37 °C) (Temporal)     BP Readings from Last 3 Encounters:   22 120/62   22 110/60   21 110/72     Pulse Readings from Last 3 Encounters:   22 78   22 78   21 72       Physical Exam:    Visit Vitals  /62 (BP 1 Location: Left upper arm, BP Patient Position: Sitting, BP Cuff Size: Small adult)   Pulse 78   Ht 5' 3\" (1.6 m)   Wt 85.7 kg (189 lb)   SpO2 98%   BMI 33.48 kg/m²      Physical Exam  HENT:      Head: Normocephalic and atraumatic. Eyes:      General: No scleral icterus. Pupils: Pupils are equal, round, and reactive to light. Cardiovascular:      Rate and Rhythm: Normal rate and regular rhythm. Heart sounds: Normal heart sounds. No murmur heard. No friction rub. No gallop.    Pulmonary: Effort: Pulmonary effort is normal. No respiratory distress. Breath sounds: Normal breath sounds. No wheezing or rales. Chest:      Chest wall: No tenderness. Abdominal:      General: Bowel sounds are normal.      Palpations: Abdomen is soft. Skin:     General: Skin is warm and dry. Findings: No rash. Neurological:      Mental Status: He is alert and oriented to person, place, and time. EKG today shows: NSR, normal axis and intervals, no ST segment abnormalities    Lab Results   Component Value Date/Time    Cholesterol, total 156 09/16/2021 11:07 AM    HDL Cholesterol 31 (L) 09/16/2021 11:07 AM    LDL, calculated 80 09/16/2021 11:07 AM    LDL, calculated 63 11/05/2019 11:24 AM    VLDL, calculated 45 (H) 09/16/2021 11:07 AM    VLDL, calculated 55 (H) 11/05/2019 11:24 AM    Triglyceride 274 (H) 09/16/2021 11:07 AM    CHOL/HDL Ratio 7.0 (H) 11/27/2015 09:30 AM     Lab Results   Component Value Date/Time    Hemoglobin A1c 7.4 (H) 09/16/2021 11:07 AM    Hemoglobin A1c (POC) 7.1 03/31/2022 09:18 AM     Stress test 2017:  CONCLUSIONS  1. Abnormal and intermediate risk pharmacological nuclear stress test.  2. Small to medium sized, dense, fixed anterolateral perfusion defect  consistent with prior myocardial infarction. 3. No reversible perfusion image abnormalities significant for  myocardial ischemia. 4. Normal left ventricular size, mildly to moderately depressed left  ventricular systolic function, ejection fraction calculated at 40%. Impression and Plan:  Hilda Vaca is a 77 y.o. with:    1.) CAD s/p CABG 2014, neg nuc 2017  2.) Normal LV function  3.) DM2 with dyslipidemia  4.) HTN  5.) Metabolic syndrome  6.) DAYANA, on CPAP    1.) Cont med therapy  2.) Agree with continued DAPT  3.) No further recs, can see me yearly    Doing well  35 mins    Thank you for allowing me to participate in the care of your patient, please do not hesitate to call with questions or concerns.           Grace VANCE Talitha Libman, DO

## 2022-09-21 NOTE — PROGRESS NOTES
Brad Holdenpin presents today for   Chief Complaint   Patient presents with    Follow-up     1 year follow up       Brad Tran preferred language for health care discussion is english/other. Is someone accompanying this pt? no    Is the patient using any DME equipment during [address]? no    Depression Screening:  3 most recent PHQ Screens 9/21/2022   Little interest or pleasure in doing things Not at all   Feeling down, depressed, irritable, or hopeless Not at all   Total Score PHQ 2 0       Learning Assessment:  Learning Assessment 9/21/2022   PRIMARY LEARNER Patient   HIGHEST LEVEL OF EDUCATION - PRIMARY LEARNER  -   BARRIERS PRIMARY LEARNER -   [facility]    NEED -   LEARNER PREFERENCE PRIMARY DEMONSTRATION   ANSWERED BY patient   RELATIONSHIP SELF       Abuse Screening:  Abuse Screening Questionnaire 9/21/2022   Do you ever feel afraid of your partner? N   Are you in a relationship with someone who physically or mentally threatens you? N   Is it safe for you to go home? Y       Fall Risk  Fall Risk Assessment, last 12 mths 9/21/2022   Able to walk? Yes   Fall in past 12 months? 0   Do you feel unsteady? 0   Are you worried about falling 0           Pt currently taking Anticoagulant therapy? no    Pt currently taking Antiplatelet therapy ? Plavix 75 mg once a day and ASA 81 mg once a day      Coordination of Care:  1. Have you been to the ER, urgent care clinic since your last visit? Hospitalized since your last visit? no    2. Have you seen or consulted any other health care providers outside of the [address] [place] [address] since your last visit? Include any pap smears or colon screening.  no

## 2022-10-12 LAB
ALBUMIN SERPL-MCNC: 4.6 G/DL (ref 3.8–4.8)
ALBUMIN/CREAT UR: 9 MG/G CREAT (ref 0–29)
ALBUMIN/GLOB SERPL: 1.8 {RATIO} (ref 1.2–2.2)
ALP SERPL-CCNC: 41 IU/L (ref 44–121)
ALT SERPL-CCNC: 17 IU/L (ref 0–44)
AST SERPL-CCNC: 20 IU/L (ref 0–40)
BASOPHILS # BLD AUTO: 0.1 X10E3/UL (ref 0–0.2)
BASOPHILS NFR BLD AUTO: 1 %
BILIRUB SERPL-MCNC: 0.4 MG/DL (ref 0–1.2)
BUN SERPL-MCNC: 22 MG/DL (ref 8–27)
BUN/CREAT SERPL: 17 (ref 10–24)
CALCIUM SERPL-MCNC: 9.4 MG/DL (ref 8.6–10.2)
CHLORIDE SERPL-SCNC: 103 MMOL/L (ref 96–106)
CHOLEST SERPL-MCNC: 162 MG/DL (ref 100–199)
CO2 SERPL-SCNC: 20 MMOL/L (ref 20–29)
CREAT SERPL-MCNC: 1.29 MG/DL (ref 0.76–1.27)
CREAT UR-MCNC: 52.6 MG/DL
EGFR: 61 ML/MIN/1.73
EOSINOPHIL # BLD AUTO: 0.3 X10E3/UL (ref 0–0.4)
EOSINOPHIL NFR BLD AUTO: 3 %
ERYTHROCYTE [DISTWIDTH] IN BLOOD BY AUTOMATED COUNT: 13.3 % (ref 11.6–15.4)
GLOBULIN SER CALC-MCNC: 2.5 G/DL (ref 1.5–4.5)
GLUCOSE SERPL-MCNC: 126 MG/DL (ref 70–99)
HBA1C MFR BLD: 7.4 % (ref 4.8–5.6)
HCT VFR BLD AUTO: 45.3 % (ref 37.5–51)
HDLC SERPL-MCNC: 32 MG/DL
HGB BLD-MCNC: 14.6 G/DL (ref 13–17.7)
IMM GRANULOCYTES # BLD AUTO: 0.1 X10E3/UL (ref 0–0.1)
IMM GRANULOCYTES NFR BLD AUTO: 1 %
IMP & REVIEW OF LAB RESULTS: NORMAL
LDLC SERPL CALC-MCNC: 85 MG/DL (ref 0–99)
LYMPHOCYTES # BLD AUTO: 2.8 X10E3/UL (ref 0.7–3.1)
LYMPHOCYTES NFR BLD AUTO: 30 %
MCH RBC QN AUTO: 27.5 PG (ref 26.6–33)
MCHC RBC AUTO-ENTMCNC: 32.2 G/DL (ref 31.5–35.7)
MCV RBC AUTO: 85 FL (ref 79–97)
MICROALBUMIN UR-MCNC: 4.8 UG/ML
MONOCYTES # BLD AUTO: 0.8 X10E3/UL (ref 0.1–0.9)
MONOCYTES NFR BLD AUTO: 8 %
NEUTROPHILS # BLD AUTO: 5.3 X10E3/UL (ref 1.4–7)
NEUTROPHILS NFR BLD AUTO: 57 %
PLATELET # BLD AUTO: 208 X10E3/UL (ref 150–450)
POTASSIUM SERPL-SCNC: 4.5 MMOL/L (ref 3.5–5.2)
PROT SERPL-MCNC: 7.1 G/DL (ref 6–8.5)
PSA SERPL-MCNC: 1.7 NG/ML (ref 0–4)
RBC # BLD AUTO: 5.31 X10E6/UL (ref 4.14–5.8)
SODIUM SERPL-SCNC: 143 MMOL/L (ref 134–144)
TRIGL SERPL-MCNC: 271 MG/DL (ref 0–149)
VLDLC SERPL CALC-MCNC: 45 MG/DL (ref 5–40)
WBC # BLD AUTO: 9.2 X10E3/UL (ref 3.4–10.8)

## 2022-10-20 DIAGNOSIS — E11.9 CONTROLLED TYPE 2 DIABETES MELLITUS WITHOUT COMPLICATION, WITHOUT LONG-TERM CURRENT USE OF INSULIN (HCC): ICD-10-CM

## 2022-10-21 RX ORDER — GLYBURIDE 2.5 MG/1
TABLET ORAL
Qty: 90 TABLET | Refills: 0 | Status: SHIPPED | OUTPATIENT
Start: 2022-10-21

## 2023-01-20 DIAGNOSIS — E11.9 CONTROLLED TYPE 2 DIABETES MELLITUS WITHOUT COMPLICATION, WITHOUT LONG-TERM CURRENT USE OF INSULIN (HCC): ICD-10-CM

## 2023-01-21 RX ORDER — GLYBURIDE 2.5 MG/1
TABLET ORAL
Qty: 90 TABLET | Refills: 0 | OUTPATIENT
Start: 2023-01-21

## 2023-01-21 NOTE — TELEPHONE ENCOUNTER
CORRECTION    Please schedule appt for routine and AWV. No need for labs at this time other than an A1c in office.

## 2023-03-06 NOTE — PROGRESS NOTES
1. \"Have you been to the ER, urgent care clinic since your last visit? Hospitalized since your last visit? \" No    2. \"Have you seen or consulted any other health care providers outside of the [address] [place] [address] since your last visit? \" No     3. For patients aged 39-70: Has the patient had a colonoscopy / FIT/ Cologuard? No      If the patient is female:    4. For patients aged 41-77: Has the patient had a mammogram within the past 2 years? NA - based on age or sex      11. For patients aged 21-65: Has the patient had a pap smear? NA - based on age or sex  Medicare Annual Wellness Visit    Karen Wells is here for Medicare AWV    Assessment & Plan   Medicare annual wellness visit, subsequent  ACP (advance care planning)  -     Trino Barajas 38. (16-30 minutes) [[id number]]      Recommendations for Preventive Services Due: see orders and patient instructions/AVS.  Recommended screening schedule for the next 5-10 years is provided to the patient in written form: see Patient Instructions/AVS.    Patient was not interested and would not answer questions in depth. He was very offended by several of the questions and raised his voice. Return in 1 year (on 3/7/2024) for Medicare Wellness exam.     Subjective     Patient's complete Health Risk Assessment and screening values have been reviewed and are found in Flowsheets. The following problems were reviewed today and where indicated follow up appointments were made and/or referrals ordered.     Positive Risk Factor Screenings with Interventions:                 Weight and Activity:  Physical Activity: Unknown    Days of Exercise per Week: Patient refused    Minutes of Exercise per Session: Patient refused     On average, how many days per week do you engage in moderate to strenuous exercise (like a brisk walk)?: Patient refused  Have you lost any weight without trying in the past 3 months?: No  Body mass index: (!) 32.41      Obesity Interventions:  Patient declines any further evaluation or treatment        Dentist Screen:  Have you seen the dentist within the past year?: (!) No   Vision Screen:  Do you have difficulty driving, watching TV, or doing any of your daily activities because of your eyesight?: No  Have you had an eye exam within the past year?: (!) No  No results found. Safety:  Do you have either shower bars, grab bars, non-slip mats or non-slip surfaces in your shower or bathtub?: (!) No   Advanced Directives:  Do you have a Living Will?: (!) No  Tobacco Use:  Tobacco Use: High Risk    Smoking Tobacco Use: Some Days    Smokeless Tobacco Use: Never    Passive Exposure: Not on file     E-cigarette/Vaping       Questions Responses    E-cigarette/Vaping Use     Start Date     Passive Exposure     Quit Date     Counseling Given     Comments           Interventions:  Patient declined any further intervention or treatment          Objective   Vitals:    03/07/23 0905   BP: 104/64   Site: Left Upper Arm   Position: Sitting   Cuff Size: Medium Adult   Pulse: 75   Resp: 16   Temp: 97.7 °F (36.5 °C)   TempSrc: Temporal   SpO2: 94%   Weight: 183 lb (83 kg)   Height: 5' 3\" (1.6 m)      Body mass index is 32.42 kg/m². Allergies   Allergen Reactions    Atorvastatin Myalgia     High dose    Peanut Oil Diarrhea and Nausea And Vomiting    Acetaminophen Other (See Comments)     hallucinations    Oxycodone-Acetaminophen Other (See Comments)     hallucinations    Dimethicone Rash     Prior to Visit Medications    Medication Sig Taking?  Authorizing Provider   glyBURIDE (DIABETA) 5 MG tablet TAKE 1 TABLET BY MOUTH ONCE DAILY BEFORE BREAKFAST  Tre Michaels MD   aspirin 81 MG EC tablet Take 81 mg by mouth daily  Ar Automatic Reconciliation   carvedilol (COREG) 25 MG tablet TAKE 1 TABLET BY MOUTH TWICE DAILY WITH MEALS  Ar Automatic Reconciliation   clopidogrel (PLAVIX) 75 MG tablet Take 1 tablet by mouth once daily  Ar Automatic Reconciliation furosemide (LASIX) 40 MG tablet Take 1 tablet by mouth once daily  Ar Automatic Reconciliation   lisinopril (PRINIVIL;ZESTRIL) 5 MG tablet Take 1 tablet by mouth once daily  Ar Automatic Reconciliation   nitroGLYCERIN (NITROSTAT) 0.4 MG SL tablet Place 0.4 mg under the tongue  Ar Automatic Reconciliation   potassium chloride (KLOR-CON) 10 MEQ extended release tablet TAKE 1 TABLET BY MOUTH ONCE DAILY WITH LASIX  Ar Automatic Reconciliation   rosuvastatin (CRESTOR) 20 MG tablet Take 1 tablet by mouth once daily  Ar Automatic Reconciliation       CareTeam (Including outside providers/suppliers regularly involved in providing care):   Patient Care Team:  Amy Garcia MD as PCP - Kira Skaggs MD as PCP - Empaneled Provider  Betzy Agustin MD as Surgeon  Catina Srivastava DO as Physician     Reviewed and updated this visit:  Tobacco  Allergies  Meds  Problems  Med Hx  Surg Hx  Soc Hx  Fam Hx               Amy Garcia MD

## 2023-03-07 ENCOUNTER — OFFICE VISIT (OUTPATIENT)
Age: 68
End: 2023-03-07
Payer: MEDICARE

## 2023-03-07 VITALS
DIASTOLIC BLOOD PRESSURE: 64 MMHG | RESPIRATION RATE: 16 BRPM | TEMPERATURE: 97.7 F | HEIGHT: 63 IN | BODY MASS INDEX: 32.43 KG/M2 | HEART RATE: 75 BPM | WEIGHT: 183 LBS | SYSTOLIC BLOOD PRESSURE: 104 MMHG | OXYGEN SATURATION: 94 %

## 2023-03-07 VITALS
DIASTOLIC BLOOD PRESSURE: 64 MMHG | BODY MASS INDEX: 32.43 KG/M2 | RESPIRATION RATE: 16 BRPM | OXYGEN SATURATION: 94 % | WEIGHT: 183 LBS | TEMPERATURE: 97.7 F | SYSTOLIC BLOOD PRESSURE: 104 MMHG | HEIGHT: 63 IN | HEART RATE: 75 BPM

## 2023-03-07 DIAGNOSIS — E66.09 OBESITY DUE TO EXCESS CALORIES WITH SERIOUS COMORBIDITY, UNSPECIFIED CLASSIFICATION: ICD-10-CM

## 2023-03-07 DIAGNOSIS — K43.9 ABDOMINAL WALL HERNIA: ICD-10-CM

## 2023-03-07 DIAGNOSIS — Z71.89 ACP (ADVANCE CARE PLANNING): ICD-10-CM

## 2023-03-07 DIAGNOSIS — E78.2 MIXED HYPERLIPIDEMIA: ICD-10-CM

## 2023-03-07 DIAGNOSIS — E11.22 TYPE 2 DIABETES MELLITUS WITH CHRONIC KIDNEY DISEASE, WITHOUT LONG-TERM CURRENT USE OF INSULIN, UNSPECIFIED CKD STAGE (HCC): Primary | ICD-10-CM

## 2023-03-07 DIAGNOSIS — Z00.00 MEDICARE ANNUAL WELLNESS VISIT, SUBSEQUENT: Primary | ICD-10-CM

## 2023-03-07 DIAGNOSIS — I11.0 HYPERTENSIVE HEART DISEASE WITH HEART FAILURE (HCC): ICD-10-CM

## 2023-03-07 DIAGNOSIS — Z95.1 PRESENCE OF AORTOCORONARY BYPASS GRAFT: ICD-10-CM

## 2023-03-07 DIAGNOSIS — Z12.5 ENCOUNTER FOR SCREENING FOR MALIGNANT NEOPLASM OF PROSTATE: ICD-10-CM

## 2023-03-07 DIAGNOSIS — G47.33 OBSTRUCTIVE SLEEP APNEA (ADULT) (PEDIATRIC): ICD-10-CM

## 2023-03-07 DIAGNOSIS — I50.22 CHRONIC SYSTOLIC (CONGESTIVE) HEART FAILURE (HCC): ICD-10-CM

## 2023-03-07 DIAGNOSIS — F17.210 CIGARETTE NICOTINE DEPENDENCE WITHOUT COMPLICATION: ICD-10-CM

## 2023-03-07 LAB — HBA1C MFR BLD: 7.5 %

## 2023-03-07 PROCEDURE — G8417 CALC BMI ABV UP PARAM F/U: HCPCS | Performed by: FAMILY MEDICINE

## 2023-03-07 PROCEDURE — 3017F COLORECTAL CA SCREEN DOC REV: CPT | Performed by: FAMILY MEDICINE

## 2023-03-07 PROCEDURE — 4004F PT TOBACCO SCREEN RCVD TLK: CPT | Performed by: FAMILY MEDICINE

## 2023-03-07 PROCEDURE — 1123F ACP DISCUSS/DSCN MKR DOCD: CPT | Performed by: FAMILY MEDICINE

## 2023-03-07 PROCEDURE — G8484 FLU IMMUNIZE NO ADMIN: HCPCS | Performed by: FAMILY MEDICINE

## 2023-03-07 PROCEDURE — 3046F HEMOGLOBIN A1C LEVEL >9.0%: CPT | Performed by: FAMILY MEDICINE

## 2023-03-07 PROCEDURE — G8427 DOCREV CUR MEDS BY ELIG CLIN: HCPCS | Performed by: FAMILY MEDICINE

## 2023-03-07 PROCEDURE — 83036 HEMOGLOBIN GLYCOSYLATED A1C: CPT | Performed by: FAMILY MEDICINE

## 2023-03-07 PROCEDURE — 99214 OFFICE O/P EST MOD 30 MIN: CPT | Performed by: FAMILY MEDICINE

## 2023-03-07 PROCEDURE — PBSHW AMB POC HEMOGLOBIN A1C: Performed by: FAMILY MEDICINE

## 2023-03-07 PROCEDURE — 2022F DILAT RTA XM EVC RTNOPTHY: CPT | Performed by: FAMILY MEDICINE

## 2023-03-07 RX ORDER — GLYBURIDE 5 MG/1
TABLET ORAL
Qty: 90 TABLET | Refills: 1 | Status: SHIPPED | OUTPATIENT
Start: 2023-03-07

## 2023-03-07 SDOH — ECONOMIC STABILITY: FOOD INSECURITY: WITHIN THE PAST 12 MONTHS, YOU WORRIED THAT YOUR FOOD WOULD RUN OUT BEFORE YOU GOT MONEY TO BUY MORE.: PATIENT DECLINED

## 2023-03-07 SDOH — ECONOMIC STABILITY: INCOME INSECURITY: HOW HARD IS IT FOR YOU TO PAY FOR THE VERY BASICS LIKE FOOD, HOUSING, MEDICAL CARE, AND HEATING?: PATIENT DECLINED

## 2023-03-07 SDOH — ECONOMIC STABILITY: FOOD INSECURITY: WITHIN THE PAST 12 MONTHS, THE FOOD YOU BOUGHT JUST DIDN'T LAST AND YOU DIDN'T HAVE MONEY TO GET MORE.: PATIENT DECLINED

## 2023-03-07 SDOH — ECONOMIC STABILITY: HOUSING INSECURITY
IN THE LAST 12 MONTHS, WAS THERE A TIME WHEN YOU DID NOT HAVE A STEADY PLACE TO SLEEP OR SLEPT IN A SHELTER (INCLUDING NOW)?: PATIENT REFUSED

## 2023-03-07 ASSESSMENT — PATIENT HEALTH QUESTIONNAIRE - PHQ9
1. LITTLE INTEREST OR PLEASURE IN DOING THINGS: 0
2. FEELING DOWN, DEPRESSED OR HOPELESS: 0
SUM OF ALL RESPONSES TO PHQ QUESTIONS 1-9: 0
SUM OF ALL RESPONSES TO PHQ QUESTIONS 1-9: 0
SUM OF ALL RESPONSES TO PHQ9 QUESTIONS 1 & 2: 0
SUM OF ALL RESPONSES TO PHQ QUESTIONS 1-9: 0
SUM OF ALL RESPONSES TO PHQ QUESTIONS 1-9: 0

## 2023-03-07 ASSESSMENT — LIFESTYLE VARIABLES
HOW MANY STANDARD DRINKS CONTAINING ALCOHOL DO YOU HAVE ON A TYPICAL DAY: PATIENT DOES NOT DRINK
HOW OFTEN DO YOU HAVE A DRINK CONTAINING ALCOHOL: NEVER

## 2023-03-07 NOTE — ACP (ADVANCE CARE PLANNING)
Advance Care Planning       Advance Care Planning (ACP) Physician/NP/PA (Provider) Conversation        Date of ACP Conversation: 3/7/2023    Conversation Conducted with:   Patient with Decision Making [address]  [facility] Decision Maker:    Current Designated Health Care Decision Maker:   Primary Decision Maker: Scott Jara Child - [phone]    Care Preferences:    Hospitalization: \"If your health worsens and it becomes clear that your chance of recovery is unlikely, what would your preference be regarding hospitalization? \"    Unsure      Ventilation: \"If you were in your present state of health and suddenly became very ill and were unable to breathe on your own, what would your preference be about the use of a ventilator (breathing machine) if it was available to you? \"      No ACP, unsure at this time. \"If your health worsens and it becomes clear that your chance of recovery is unlikely, what would your preference be about the use of a ventilator (breathing machine) if it was available to you? \"     No ACP, unsure at this time. Resuscitation:  \"CPR works best to restart the heart when there is a sudden event, like a heart attack, in someone who is otherwise healthy. Unfortunately, CPR does not typically restart the heart for people who have serious health conditions or who are very sick. \"    \"In the event your heart stopped as a result of an underlying serious health condition, would you want attempts to be made to restart your heart (answer \"yes\" for attempt to resuscitate) or would you prefer a natural death (answer \"no\" for do not attempt to resuscitate)? \"     Unsure at this time.     General ACP for ALL Patients with Decision Making Capacity:   Importance of advance care planning, including choosing a healthcare agent to communicate patient's healthcare decisions if patient lost the ability to make decisions, such as after a sudden illness or accident  Understanding of the healthcare agent role was assessed and information provided    Interventions Provided:  Referral made for ACP follow-up assistance to:  nurse  Recommended review of completed ACP document annually or upon change in health status    Length of Voluntary ACP Conversation in minutes:  16 minutes      Adilson Robles MD

## 2023-03-07 NOTE — PATIENT INSTRUCTIONS
Advance Directives: Care Instructions  Overview  An advance directive is a legal way to state your wishes at the end of your life. It tells your family and your doctor what to do if you can't say what you want. There are two main types of advance directives. You can change them any time your wishes change. Living will. This form tells your family and your doctor your wishes about life support and other treatment. The form is also called a declaration. Medical power of . This form lets you name a person to make treatment decisions for you when you can't speak for yourself. This person is called a health care agent (health care proxy, health care surrogate). The form is also called a durable power of  for health care. If you do not have an advance directive, decisions about your medical care may be made by a family member, or by a doctor or a  who doesn't know you. It may help to think of an advance directive as a gift to the people who care for you. If you have one, they won't have to make tough decisions by themselves. For more information, including forms for your state, see the [address] website (www.caringinfo.org/planning/advance-directives/). Follow-up care is a key part of your treatment and safety. Be sure to make and go to all appointments, and call your doctor if you are having problems. It's also a good idea to know your test results and keep a list of the medicines you take. What should you include in an advance directive? Many states have a unique advance directive form. (It may ask you to address specific issues.) Or you might use a universal form that's approved by many states. If your form doesn't tell you what to address, it may be hard to know what to include in your advance directive. Use the questions below to help you get started. · Who do you want to make decisions about your medical care if you are not able to?   · What life-support measures do you want if you have a serious illness that gets worse over time or can't be cured? · What are you most afraid of that might happen? (Maybe you're afraid of having pain, losing your independence, or being kept alive by machines.)  · Where would you prefer to die? (Your home? A hospital? A nursing home?)  · Do you want to donate your organs when you die? · Do you want certain Confucianism practices performed before you die? When should you call for help? Be sure to contact your doctor if you have any questions. Where can you learn more? Go to http://www.wheeler.com/ and enter R264 to learn more about \"Advance Directives: Care Instructions. \"  Current as of: June 16, 2022               Content Version: 13.5  © 3679-2178 [employer]. Care instructions adapted under license by [facility] [place] ([facility]). If you have questions about a medical condition or this instruction, always ask your healthcare professional. [place] [address] any warranty or liability for your use of this information. Starting a Weight Loss Plan: Care Instructions  Overview     If you're thinking about losing weight, it can be hard to know where to start. Your doctor can help you set up a weight loss plan that best meets your needs. You may want to take a class on nutrition or exercise, or you could join a weight loss support group. If you have questions about how to make changes to your eating or exercise habits, ask your doctor about seeing a registered dietitian or an exercise specialist.  It can be a big challenge to lose weight. But you don't have to make huge changes at once. Make small changes, and stick with them. When those changes become habit, add a few more changes. If you don't think you're ready to make changes right now, try to pick a date in the future. Make an appointment to see your doctor to discuss whether the time is right for you to start a plan.   Follow-up care is a key part of your treatment and safety. Be sure to make and go to all appointments, and call your doctor if you are having problems. It's also a good idea to know your test results and keep a list of the medicines you take. How can you care for yourself at home? · Set realistic goals. Many people expect to lose much more weight than is likely. A weight loss of 5% to 10% of your body weight may be enough to improve your health. · Get family and friends involved to provide support. Talk to them about why you are trying to lose weight, and ask them to help. They can help by participating in exercise and having meals with you, even if they may be eating something different. · Find what works best for you. If you do not have time or do not like to cook, a program that offers meal replacement bars or shakes may be better for you. Or if you like to prepare meals, finding a plan that includes daily menus and recipes may be best.  · Ask your doctor about other health professionals who can help you achieve your weight loss goals. ? A dietitian can help you make healthy changes in your diet. ? An exercise specialist or  can help you develop a safe and effective exercise program.  ? A counselor or psychiatrist can help you cope with issues such as depression, anxiety, or family problems that can make it hard to focus on weight loss. · Consider joining a support group for people who are trying to lose weight. Your doctor can suggest groups in your area. Where can you learn more? Go to http://www.woods.com/ and enter U357 to learn more about \"Starting a Weight Loss Plan: Care Instructions. \"  Current as of: August 25, 2022               Content Version: 13.5  © 1254-9981 [employer]. Care instructions adapted under license by [facility] ([facility]).  If you have questions about a medical condition or this instruction, always ask your healthcare professional. Judith Andres disclaims any warranty or liability for your use of this information. A Healthy Heart: Care Instructions  Your Care Instructions     Coronary artery disease, also called heart disease, occurs when a substance called plaque builds up in the vessels that supply oxygen-rich blood to your heart muscle. This can narrow the blood vessels and reduce blood flow. A heart attack happens when blood flow is completely blocked. A high-fat diet, smoking, and other factors increase the risk of heart disease. Your doctor has found that you have a chance of having heart disease. You can do lots of things to keep your heart healthy. It may not be easy, but you can change your diet, exercise more, and quit smoking. These steps really work to lower your chance of heart disease. Follow-up care is a key part of your treatment and safety. Be sure to make and go to all appointments, and call your doctor if you are having problems. It's also a good idea to know your test results and keep a list of the medicines you take. How can you care for yourself at home? Diet    · Use less salt when you cook and eat. This helps lower your blood pressure. Taste food before salting. Add only a little salt when you think you need it. With time, your taste buds will adjust to less salt.     · Eat fewer snack items, fast foods, canned soups, and other high-salt, high-fat, processed foods.     · Read food labels and try to avoid saturated and trans fats. They increase your risk of heart disease by raising cholesterol levels.     · Limit the amount of solid fat-butter, margarine, and shortening-you eat. Use olive, peanut, or canola oil when you cook. Bake, broil, and steam foods instead of frying them.     · Eat a variety of fruit and vegetables every day. Dark green, deep orange, red, or yellow fruits and vegetables are especially good for you.  Examples include spinach, carrots, peaches, and berries.     · Foods high in fiber can reduce your cholesterol and provide important vitamins and minerals. High-fiber foods include whole-grain cereals and breads, oatmeal, beans, brown rice, citrus fruits, and apples.     · Eat lean proteins. Heart-healthy proteins include seafood, lean meats and poultry, eggs, beans, peas, nuts, seeds, and soy products.     · Limit drinks and foods with added sugar. These include candy, desserts, and soda pop. Lifestyle changes    · If your doctor recommends it, get more exercise. Walking is a good choice. Bit by bit, increase the amount you walk every day. Try for at least 30 minutes on most days of the week. You also may want to swim, bike, or do other activities.     · Do not smoke. If you need help quitting, talk to your doctor about stop-smoking programs and medicines. These can increase your chances of quitting for good. Quitting smoking may be the most important step you can take to protect your heart. It is never too late to quit.     · Limit alcohol to 2 drinks a day for men and 1 drink a day for women. Too much alcohol can cause health problems.     · Manage other health problems such as diabetes, high blood pressure, and high cholesterol. If you think you may have a problem with alcohol or drug use, talk to your doctor. Medicines    · Take your medicines exactly as prescribed. Call your doctor if you think you are having a problem with your medicine.     · If your doctor recommends aspirin, take the amount directed each day. Make sure you take aspirin and not another kind of pain reliever, such as acetaminophen (Tylenol). When should you call for help? Call 911 if you have symptoms of a heart attack. These may include:    · Chest pain or pressure, or a strange feeling in the chest.     · Sweating.     · Shortness of breath.     · Pain, pressure, or a strange feeling in the back, neck, jaw, or upper belly or in one or both shoulders or arms.     · Lightheadedness or sudden weakness.     · A fast or irregular heartbeat.    After you call 911, the  may tell you to chew 1 adult-strength or 2 to 4 low-dose aspirin. Wait for an ambulance. Do not try to drive yourself. Watch closely for changes in your health, and be sure to contact your doctor if you have any problems. Where can you learn more? Go to http://www.wheeler.com/ and enter F075 to learn more about \"A Healthy Heart: Care Instructions. \"  Current as of: September 7, 2022               Content Version: 13.5  © 9128-4024 [employer]. Care instructions adapted under license by [facility] ([facility]). If you have questions about a medical condition or this instruction, always ask your healthcare professional. [address] any warranty or liability for your use of this information. Personalized Preventive Plan for James Marquez - 3/7/2023  Medicare offers a range of preventive health benefits. Some of the tests and screenings are paid in full while other may be subject to a deductible, co-insurance, and/or copay. Some of these benefits include a comprehensive review of your medical history including lifestyle, illnesses that may run in your family, and various assessments and screenings as appropriate. After reviewing your medical record and screening and assessments performed today your provider may have ordered immunizations, labs, imaging, and/or referrals for you. A list of these orders (if applicable) as well as your Preventive Care list are included within your After Visit Summary for your review. Other Preventive Recommendations:    A preventive eye exam performed by an eye specialist is recommended every 1-2 years to screen for glaucoma; cataracts, macular degeneration, and other eye disorders. A preventive dental visit is recommended every 6 months. Try to get at least 150 minutes of exercise per week or 10,000 steps per day on a pedometer .   Order or download the FREE \"Exercise & Physical Activity: Your Everyday Guide\" from [address] on [address] [facility]. Call 7-645.838.6132 or search [employer] online. You need 7271-8064 mg of calcium and 4057-5591 IU of vitamin D per day. It is possible to meet your calcium requirement with diet alone, but a vitamin D supplement is usually necessary to meet this goal.  When exposed to the sun, use a sunscreen that protects against both UVA and UVB radiation with an SPF of 30 or greater. Reapply every 2 to 3 hours or after sweating, drying off with a towel, or swimming. Always wear a seat belt when traveling in a car. Always wear a helmet when riding a bicycle or motorcycle.

## 2023-03-07 NOTE — PROGRESS NOTES
SUBJECTIVE  Chief Complaint   Patient presents with    Hernia     Possible hernia on right side     Diabetes    Hypertension    Cholesterol Problem     Here for routine follow-up. He is taking glyburide. Not keeping up with diabetic eye exams. Denies any visual changes. Does not report any polyuria or polydipsia. He is currently doing well from a cardiac standpoint without any cardiac chest pain or dyspnea. He has been compliant with Plavix. He is tolerating his statin without myalgias. He is also on a diuretic. He has sleep apnea but still cannot wear CPAP because he said it suffocates him. He does not see a sleep doctor currently. Has a bulge on the right side of his abd when he sits up in bed on seldom occasion. It is not in the center. It stays present until he massages it in. It can be painful. OBJECTIVE    Blood pressure 104/64, pulse 75, temperature 97.7 °F (36.5 °C), temperature source Temporal, resp. rate 16, height 5' 3\" (1.6 m), weight 183 lb (83 kg), SpO2 94 %. General:  alert, cooperative, well appearing, in no apparent distress. HEENT: no oral lesions. No neck masses. Heart: Normal S1S2, RRR. Chest: Clear, no w/r/r. Abd: no active hernias palpated. There is an area on his right abd that is a bit tender with shallower muscle quality compared to the left side. Neuro:  Monofilament testing is intact. No motor or sensory deficits. Foot exam  was performed. No deformities. Sensory and motor testing was assessed - intact. Pedal pulse(s) were assessed - intact. No pedal lesions. Monofilament testing is intact. Mild plantar callusing. Psych: normal affect. Mood good. Oriented x 3. Judgement and insight intact. ASSESSMENT / PLAN   Diagnosis Orders   1.  Type 2 diabetes mellitus with chronic kidney disease, without long-term current use of insulin, unspecified CKD stage (HCC)  AMB POC HEMOGLOBIN A1C    CBC with Auto Differential    Comprehensive Metabolic Panel    Lipid Panel    Hemoglobin A1C    Microalbumin / Creatinine Urine Ratio      2. Hypertensive heart disease with heart failure (HCC)  CBC with Auto Differential    Comprehensive Metabolic Panel      3. Chronic systolic (congestive) heart failure ([address]        4. Presence of aortocoronary bypass graft        5. Mixed hyperlipidemia  Comprehensive Metabolic Panel    Lipid Panel      6. Obesity due to excess calories with serious comorbidity, unspecified classification        7. Obstructive sleep apnea (adult) (pediatric)        8. Cigarette nicotine dependence without complication        9. Encounter for screening for malignant neoplasm of prostate  PSA Screening      10. Abdominal wall hernia  [facility] - Reta Carias DO, General Surgery, [facility] reviewed. Diabetes with CKD -  A1c shows uncontrolled. I have counseled him on diet lower in carbs. Inc to glyburide 5mg daily. Advised regular foot checks and annual eye exams. Enc annual eye exams. CAD with bypass graft present / chronic CHF (compensated) / hypertensive heart disease / dyslipidemia - Controlled and under care of cardiology. Latest notes reviewed. Continue current care. Obesity - Diabetic dieting. Cont activity as tolerated. Sleep apnea - enc that he follows with a sleep specialist.    Smoker - advised on smoking cessation. Abd wall hernia - suggested based on symptoms. Refer to general surgery for eval and treatment if necessary. Declines AAA testing / vaccination. All chart history elements were reviewed by me at the time of the visit even though marked at time of note closure. Patient understands our medical plan. Patient has provided input and agrees with goals. Alternatives have been explained and offered. All questions answered. The patient is to call if condition worsens or fails to improve. Return in about 6 months (around 9/7/2023) for routine care.  Fasting labs due 3-7 days prior to appointment.

## 2023-03-07 NOTE — PROGRESS NOTES
1. \"Have you been to the ER, urgent care clinic since your last visit? Hospitalized since your last visit? \" No    2. \"Have you seen or consulted any other health care providers outside of the [address] [place] [address] since your last visit? \" No     3. For patients aged 39-70: Has the patient had a colonoscopy / FIT/ Cologuard? No      If the patient is female:    4. For patients aged 41-77: Has the patient had a mammogram within the past 2 years? NA - based on age or sex      11. For patients aged 21-65: Has the patient had a pap smear? NA - based on age or sex    No updated eye exam.    All medications are the same per patient.

## 2023-03-21 RX ORDER — CARVEDILOL 25 MG/1
TABLET ORAL
Qty: 180 TABLET | Refills: 3 | Status: SHIPPED | OUTPATIENT
Start: 2023-03-21

## 2023-04-05 ENCOUNTER — OFFICE VISIT (OUTPATIENT)
Age: 68
End: 2023-04-05
Payer: MEDICARE

## 2023-04-05 VITALS
DIASTOLIC BLOOD PRESSURE: 60 MMHG | BODY MASS INDEX: 33.13 KG/M2 | HEIGHT: 63 IN | SYSTOLIC BLOOD PRESSURE: 100 MMHG | TEMPERATURE: 98 F | HEART RATE: 76 BPM | WEIGHT: 187 LBS | OXYGEN SATURATION: 96 % | RESPIRATION RATE: 18 BRPM

## 2023-04-05 DIAGNOSIS — G47.33 SEVERE OBSTRUCTIVE SLEEP APNEA: ICD-10-CM

## 2023-04-05 DIAGNOSIS — I67.9 CEREBROVASCULAR DISEASE: ICD-10-CM

## 2023-04-05 DIAGNOSIS — Z95.1 S/P CABG X 4: ICD-10-CM

## 2023-04-05 DIAGNOSIS — K42.9 UMBILICAL HERNIA WITHOUT OBSTRUCTION AND WITHOUT GANGRENE: ICD-10-CM

## 2023-04-05 DIAGNOSIS — K43.9 VENTRAL HERNIA WITHOUT OBSTRUCTION OR GANGRENE: Primary | ICD-10-CM

## 2023-04-05 DIAGNOSIS — E66.9 OBESITY (BMI 30-39.9): ICD-10-CM

## 2023-04-05 PROCEDURE — 3017F COLORECTAL CA SCREEN DOC REV: CPT | Performed by: SURGERY

## 2023-04-05 PROCEDURE — 1123F ACP DISCUSS/DSCN MKR DOCD: CPT | Performed by: SURGERY

## 2023-04-05 PROCEDURE — G8427 DOCREV CUR MEDS BY ELIG CLIN: HCPCS | Performed by: SURGERY

## 2023-04-05 PROCEDURE — 99203 OFFICE O/P NEW LOW 30 MIN: CPT | Performed by: SURGERY

## 2023-04-05 PROCEDURE — G8417 CALC BMI ABV UP PARAM F/U: HCPCS | Performed by: SURGERY

## 2023-04-05 PROCEDURE — 4004F PT TOBACCO SCREEN RCVD TLK: CPT | Performed by: SURGERY

## 2023-04-05 ASSESSMENT — ENCOUNTER SYMPTOMS
ABDOMINAL DISTENTION: 0
CHEST TIGHTNESS: 0
ABDOMINAL PAIN: 0
SHORTNESS OF BREATH: 0

## 2023-04-05 NOTE — PROGRESS NOTES
CC:   Chief Complaint   Patient presents with    Surgical Consult     Ventral hernia        Assessment:    ICD-10-CM    1. Ventral hernia without obstruction or gangrene  K43.9       2. S/P CABG x 4  Z95.1       3. Obesity (BMI 30-39. 9)  E66.9       4. Cerebrovascular disease  I67.9       5. Severe obstructive sleep apnea  G47.33           Plan: He does have a small reducible umbilical hernia but this is not his problem and he did not know this was even present. On the right side of the abdomen we are not able to reproduce any of his symptoms nor am I able to feel any hernia on today's exam.  We discussed the possibility of monitoring this area versus imaging with CT scan. Generally speaking asymptomatic hernias do not need to be fixed if small and not causing any other problems. This may be some type of lateral abdominal wall hernia again I cannot appreciate anything on today's exam.  Feels comfortable monitoring this area and knows to return to our office immediately should symptoms recur or he feels any palpable mass where we are able to examine it or order any additional imaging. Should he develop ongoing symptoms and we can identify a hernia laparoscopic approach wound best. He agrees with this plan. HPI:  Hawa Pacheco is a 79 y.o. male who is referred by Bobbe Gitelman, MD for ventral hernia. He states that he had 2 episodes where he got out of bed and felt a fist like mass right side of his abdomen that he massaged back in and pain went away completely with no lingering symptoms. He has no nausea or vomiting. He denies any similar problems in the past. He has not undergone abdominal surgery before or hernia surgeries. His weight has been relatively stable. 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 Allergies:   Allergies   Allergen Reactions    Atorvastatin Myalgia     High dose    Peanut Oil Diarrhea and Nausea And Vomiting    Acetaminophen Other (See Comments)

## 2023-05-08 RX ORDER — FUROSEMIDE 40 MG/1
TABLET ORAL
Qty: 90 TABLET | Refills: 3 | Status: SHIPPED | OUTPATIENT
Start: 2023-05-08

## 2023-05-08 RX ORDER — CLOPIDOGREL BISULFATE 75 MG/1
TABLET ORAL
Qty: 90 TABLET | Refills: 3 | Status: SHIPPED | OUTPATIENT
Start: 2023-05-08

## 2023-05-08 RX ORDER — LISINOPRIL 5 MG/1
TABLET ORAL
Qty: 90 TABLET | Refills: 3 | Status: SHIPPED | OUTPATIENT
Start: 2023-05-08

## 2023-08-05 NOTE — TELEPHONE ENCOUNTER
Patient already declined visit. He is due. Next available in person is okay.
Placed call to [phone] (Number disconnected and no further information is provided) Called [phone] (No voicemail set up for the mobile device) Letter mailed to address on file
Alert-The patient is alert, awake and responds to voice. The patient is oriented to time, place, and person. The triage nurse is able to obtain subjective information.

## 2023-08-29 LAB
BASOPHILS # BLD AUTO: 0.1 X10E3/UL (ref 0–0.2)
BASOPHILS NFR BLD AUTO: 1 %
EOSINOPHIL # BLD AUTO: 0.2 X10E3/UL (ref 0–0.4)
EOSINOPHIL NFR BLD AUTO: 2 %
ERYTHROCYTE [DISTWIDTH] IN BLOOD BY AUTOMATED COUNT: 13.3 % (ref 11.6–15.4)
HCT VFR BLD AUTO: 43.3 % (ref 37.5–51)
HGB BLD-MCNC: 14 G/DL (ref 13–17.7)
IMM GRANULOCYTES # BLD AUTO: 0.1 X10E3/UL (ref 0–0.1)
IMM GRANULOCYTES NFR BLD AUTO: 1 %
LYMPHOCYTES # BLD AUTO: 2.7 X10E3/UL (ref 0.7–3.1)
LYMPHOCYTES NFR BLD AUTO: 27 %
MCH RBC QN AUTO: 27.7 PG (ref 26.6–33)
MCHC RBC AUTO-ENTMCNC: 32.3 G/DL (ref 31.5–35.7)
MCV RBC AUTO: 86 FL (ref 79–97)
MONOCYTES # BLD AUTO: 0.8 X10E3/UL (ref 0.1–0.9)
MONOCYTES NFR BLD AUTO: 8 %
NEUTROPHILS # BLD AUTO: 6.1 X10E3/UL (ref 1.4–7)
NEUTROPHILS NFR BLD AUTO: 61 %
PLATELET # BLD AUTO: 194 X10E3/UL (ref 150–450)
RBC # BLD AUTO: 5.06 X10E6/UL (ref 4.14–5.8)
SPECIMEN STATUS REPORT: NORMAL
WBC # BLD AUTO: 9.9 X10E3/UL (ref 3.4–10.8)

## 2023-08-30 LAB
ALBUMIN SERPL-MCNC: 4.4 G/DL (ref 3.9–4.9)
ALBUMIN/GLOB SERPL: 1.8 {RATIO} (ref 1.2–2.2)
ALP SERPL-CCNC: 42 IU/L (ref 44–121)
ALT SERPL-CCNC: 14 IU/L (ref 0–44)
AST SERPL-CCNC: 16 IU/L (ref 0–40)
BILIRUB SERPL-MCNC: 0.3 MG/DL (ref 0–1.2)
BUN SERPL-MCNC: 23 MG/DL (ref 8–27)
BUN/CREAT SERPL: 18 (ref 10–24)
CALCIUM SERPL-MCNC: 8.8 MG/DL (ref 8.6–10.2)
CHLORIDE SERPL-SCNC: 104 MMOL/L (ref 96–106)
CHOLEST SERPL-MCNC: 141 MG/DL (ref 100–199)
CO2 SERPL-SCNC: 23 MMOL/L (ref 20–29)
CREAT SERPL-MCNC: 1.31 MG/DL (ref 0.76–1.27)
EGFRCR SERPLBLD CKD-EPI 2021: 60 ML/MIN/1.73
GLOBULIN SER CALC-MCNC: 2.4 G/DL (ref 1.5–4.5)
GLUCOSE SERPL-MCNC: 152 MG/DL (ref 70–99)
HBA1C MFR BLD: 7.6 % (ref 4.8–5.6)
HDLC SERPL-MCNC: 30 MG/DL
LDLC SERPL CALC-MCNC: 65 MG/DL (ref 0–99)
POTASSIUM SERPL-SCNC: 4.4 MMOL/L (ref 3.5–5.2)
PROT SERPL-MCNC: 6.8 G/DL (ref 6–8.5)
PSA SERPL-MCNC: 1.7 NG/ML (ref 0–4)
SODIUM SERPL-SCNC: 142 MMOL/L (ref 134–144)
TRIGL SERPL-MCNC: 291 MG/DL (ref 0–149)
VLDLC SERPL CALC-MCNC: 46 MG/DL (ref 5–40)

## 2023-08-31 LAB
ALBUMIN/CREAT UR: 11 MG/G CREAT (ref 0–29)
CREAT UR-MCNC: 56 MG/DL
MICROALBUMIN UR-MCNC: 6.3 UG/ML

## 2023-09-06 ENCOUNTER — TELEPHONE (OUTPATIENT)
Age: 68
End: 2023-09-06

## 2023-09-06 NOTE — TELEPHONE ENCOUNTER
----- Message from [employer] sent at 9/5/2023  1:03 PM EDT -----  Subject: Appointment Request    Reason for Call: Established Patient Appointment needed: Routine Existing   Condition Follow Up    QUESTIONS    Reason for appointment request? Available appointments did not meet   patient need     Additional Information for Provider? Pt had fasting LABS done, need to   cancel and R/S appt for 6 month f/u fasting labs DUE MARCH  from   09/07/23. Please contact to R/S or on the phone if any things wrong. Pt is   having car trouble.  Please contact if needs to R/S or if Pt needs to come   in.   ---------------------------------------------------------------------------  --------------  Sindy NARVAEZ  [phone]; OK to leave message on voicemail,Do not leave any message,   patient will call back for answer  ---------------------------------------------------------------------------  --------------  SCRIPT ANSWERS

## 2023-09-06 NOTE — TELEPHONE ENCOUNTER
Spoke with  Alma Vivar whom stated he has car trouble, vehicle is being put in the shop today, wanted to re-scheduled appointment from 09/07/2023 to (11/13/2023 @ 8:45 am) and would like Dr. Juan Manuel Subramanian to call with his results once they have been reviewed. Please advise.

## 2023-09-07 NOTE — TELEPHONE ENCOUNTER
Left message via voice for Mr. Hedy Heredia in reference to his labs. Advised that per Dr. Trino Sharif he will go over results at his appointment, there is nothing urgent, his diabetes is a bit worse and cholesterol is not ideally controlled and that Dr. Trino Sharif has appointments for next week if he wants to move his appointment.

## 2023-09-07 NOTE — TELEPHONE ENCOUNTER
Nurse to advise. We will go over results at his appt. There is nothing urgent. Diabetes is a bit worse and cholesterol is not ideally controlled. We have appts next week if he wants to move up.

## 2023-10-06 RX ORDER — GLYBURIDE 5 MG/1
TABLET ORAL
Qty: 90 TABLET | Refills: 0 | Status: SHIPPED | OUTPATIENT
Start: 2023-10-06

## 2023-10-18 RX ORDER — POTASSIUM CHLORIDE 750 MG/1
TABLET, FILM COATED, EXTENDED RELEASE ORAL
Qty: 90 TABLET | Refills: 3 | Status: SHIPPED | OUTPATIENT
Start: 2023-10-18

## 2023-11-13 ENCOUNTER — OFFICE VISIT (OUTPATIENT)
Age: 68
End: 2023-11-13
Payer: MEDICARE

## 2023-11-13 VITALS
WEIGHT: 186 LBS | HEIGHT: 63 IN | BODY MASS INDEX: 32.96 KG/M2 | SYSTOLIC BLOOD PRESSURE: 118 MMHG | RESPIRATION RATE: 18 BRPM | HEART RATE: 68 BPM | TEMPERATURE: 98.8 F | OXYGEN SATURATION: 97 % | DIASTOLIC BLOOD PRESSURE: 68 MMHG

## 2023-11-13 DIAGNOSIS — G47.33 OBSTRUCTIVE SLEEP APNEA (ADULT) (PEDIATRIC): ICD-10-CM

## 2023-11-13 DIAGNOSIS — E66.09 OBESITY DUE TO EXCESS CALORIES WITH SERIOUS COMORBIDITY, UNSPECIFIED CLASSIFICATION: ICD-10-CM

## 2023-11-13 DIAGNOSIS — I11.0 HYPERTENSIVE HEART DISEASE WITH HEART FAILURE (HCC): ICD-10-CM

## 2023-11-13 DIAGNOSIS — E11.22 TYPE 2 DIABETES MELLITUS WITH CHRONIC KIDNEY DISEASE, WITHOUT LONG-TERM CURRENT USE OF INSULIN, UNSPECIFIED CKD STAGE (HCC): Primary | ICD-10-CM

## 2023-11-13 DIAGNOSIS — Z95.1 PRESENCE OF AORTOCORONARY BYPASS GRAFT: ICD-10-CM

## 2023-11-13 DIAGNOSIS — F17.210 CIGARETTE NICOTINE DEPENDENCE WITHOUT COMPLICATION: ICD-10-CM

## 2023-11-13 DIAGNOSIS — D12.6 ADENOMATOUS POLYP OF COLON, UNSPECIFIED PART OF COLON: ICD-10-CM

## 2023-11-13 DIAGNOSIS — E78.2 MIXED HYPERLIPIDEMIA: ICD-10-CM

## 2023-11-13 DIAGNOSIS — I50.22 CHRONIC SYSTOLIC (CONGESTIVE) HEART FAILURE (HCC): ICD-10-CM

## 2023-11-13 PROCEDURE — 1123F ACP DISCUSS/DSCN MKR DOCD: CPT | Performed by: FAMILY MEDICINE

## 2023-11-13 PROCEDURE — 3017F COLORECTAL CA SCREEN DOC REV: CPT | Performed by: FAMILY MEDICINE

## 2023-11-13 PROCEDURE — G8427 DOCREV CUR MEDS BY ELIG CLIN: HCPCS | Performed by: FAMILY MEDICINE

## 2023-11-13 PROCEDURE — 3051F HG A1C>EQUAL 7.0%<8.0%: CPT | Performed by: FAMILY MEDICINE

## 2023-11-13 PROCEDURE — 99214 OFFICE O/P EST MOD 30 MIN: CPT | Performed by: FAMILY MEDICINE

## 2023-11-13 PROCEDURE — G8484 FLU IMMUNIZE NO ADMIN: HCPCS | Performed by: FAMILY MEDICINE

## 2023-11-13 PROCEDURE — G8417 CALC BMI ABV UP PARAM F/U: HCPCS | Performed by: FAMILY MEDICINE

## 2023-11-13 PROCEDURE — 4004F PT TOBACCO SCREEN RCVD TLK: CPT | Performed by: FAMILY MEDICINE

## 2023-11-13 PROCEDURE — 2022F DILAT RTA XM EVC RTNOPTHY: CPT | Performed by: FAMILY MEDICINE

## 2023-11-13 NOTE — PROGRESS NOTES
1. \"Have you been to the ER, urgent care clinic since your last visit? Hospitalized since your last visit? \" No    2. \"Have you seen or consulted any other health care providers outside of the [address] [place] [address] since your last visit? \" No     3. For patients aged 43-73: Has the patient had a colonoscopy / FIT/ Cologuard? Yes - Care Gap present. Most recent result on file was due 09/07/2022    Annual eye exam: 01/27/2021  Pneumococcal vaccine: 09/18/2014  Flu vaccine: 09/18/2014  Patient instructed to remove shoes:  Yes

## 2023-12-04 RX ORDER — ROSUVASTATIN CALCIUM 20 MG/1
TABLET, COATED ORAL
Qty: 90 TABLET | Refills: 3 | Status: SHIPPED | OUTPATIENT
Start: 2023-12-04

## 2023-12-12 ENCOUNTER — OFFICE VISIT (OUTPATIENT)
Age: 68
End: 2023-12-12
Payer: MEDICARE

## 2023-12-12 VITALS
BODY MASS INDEX: 33.66 KG/M2 | WEIGHT: 190 LBS | HEART RATE: 78 BPM | SYSTOLIC BLOOD PRESSURE: 113 MMHG | HEIGHT: 63 IN | OXYGEN SATURATION: 96 % | TEMPERATURE: 98 F | DIASTOLIC BLOOD PRESSURE: 62 MMHG

## 2023-12-12 DIAGNOSIS — S21.209A OPEN WOUND OF BACK, UNSPECIFIED LATERALITY, INITIAL ENCOUNTER: Primary | ICD-10-CM

## 2023-12-12 PROCEDURE — 3017F COLORECTAL CA SCREEN DOC REV: CPT | Performed by: SURGERY

## 2023-12-12 PROCEDURE — 4004F PT TOBACCO SCREEN RCVD TLK: CPT | Performed by: SURGERY

## 2023-12-12 PROCEDURE — G8417 CALC BMI ABV UP PARAM F/U: HCPCS | Performed by: SURGERY

## 2023-12-12 PROCEDURE — 1123F ACP DISCUSS/DSCN MKR DOCD: CPT | Performed by: SURGERY

## 2023-12-12 PROCEDURE — G8427 DOCREV CUR MEDS BY ELIG CLIN: HCPCS | Performed by: SURGERY

## 2023-12-12 PROCEDURE — 99214 OFFICE O/P EST MOD 30 MIN: CPT | Performed by: SURGERY

## 2023-12-12 PROCEDURE — G8484 FLU IMMUNIZE NO ADMIN: HCPCS | Performed by: SURGERY

## 2023-12-12 RX ORDER — SULFAMETHOXAZOLE AND TRIMETHOPRIM 800; 160 MG/1; MG/1
1 TABLET ORAL 2 TIMES DAILY
Qty: 14 TABLET | Refills: 0 | Status: SHIPPED | OUTPATIENT
Start: 2023-12-12 | End: 2023-12-19

## 2023-12-12 NOTE — PROGRESS NOTES
General Surgery Consult    Cinthya Acuña  Admit date: (Not on file)    MRN: [id number]     : 1955     Age: 76 y.o. Attending Physician: Rusty Kirk MD, [facility]      History of Present Illness:      Cinthya Acuña is a 76 y.o. male who was referred to me for evaluation of open wound on the back. The patient stated that he had the sebaceous cyst infected and it was draining and he was not able to get to any physician for management. His daughter was trying to help him with the wound care. He said that the wound has improved remarkably and it is partially closed now. He denies any fever or chills. Patient Active Problem List    Diagnosis Date Noted    Inflamed sebaceous cyst 2020    CAD S/P percutaneous coronary angioplasty     Advance care planning 09/15/2016    Severe obstructive sleep apnea 02/10/2015    Carpal tunnel syndrome on both sides 2015    Obesity (BMI 30-39. 9)     Chest pain, musculoskeletal     H/O tobacco use, presenting hazards to health     Cerebrovascular disease     Dyslipidemia     S/P CABG x 4 2014     Past Medical History:   Diagnosis Date    BPPV (benign paroxysmal positional vertigo) 2010    CAD (coronary artery disease), native coronary artery     cath (2014): 40% LM, pLAD 85%, dLAD 70%, dLCx 80%, mRCA 95%. s/p CABG x 4 (LIMA->D, SVG->dLAD, Seq SVG -> RPDA & OM2)    Carotid stenosis 2014    Mod 50-69% DOMINGO stenosis. Mild 4-40% LICA stenosis. Occluded right vertebral.       Carpal tunnel syndrome     Cerebrovascular disease     Moderate DOMINGO, mild LICA, occluded R vertebral (Doppler 2014)    Chest pain, musculoskeletal     post CABG    Diabetes ([address])     Dyslipidemia     H/O tobacco use, presenting hazards to health     quit     Headache(784.0)     cluster    Heart attack ([address]) 2015    Heart attack ([address]) 2014    Hiatal hernia     History of echocardiogram 2014    Tech difficult. EF 40-45%. Mild, diffuse hypk.

## 2024-01-16 ENCOUNTER — NURSE ONLY (OUTPATIENT)
Age: 69
End: 2024-01-16

## 2024-01-16 VITALS
OXYGEN SATURATION: 94 % | HEIGHT: 63 IN | HEART RATE: 76 BPM | RESPIRATION RATE: 17 BRPM | BODY MASS INDEX: 32.78 KG/M2 | TEMPERATURE: 97.6 F | WEIGHT: 185 LBS

## 2024-01-16 DIAGNOSIS — Z86.010 HISTORY OF COLON POLYPS: ICD-10-CM

## 2024-01-16 DIAGNOSIS — I67.9 CEREBROVASCULAR DISEASE: Primary | ICD-10-CM

## 2024-01-16 DIAGNOSIS — Z12.11 COLON CANCER SCREENING: ICD-10-CM

## 2024-01-17 NOTE — PROGRESS NOTES
Colon Screen    Patient: Rusty Lewis MRN: [id number]  SSN: [id number]    YOB: 1955  Age: 68 y.o.  Sex: male        Subjective:   Rusty Lewis is here to schedule his recall colonoscopy. His PCP is Deep Burger MD.  Patient referred for colonoscopy for   Personal history of colon polyps (screening only). Patient denies rectal pain or bleeding.  Abdominal surgeries as described below, specifically none.  Family history as described below, specifically none. Last colonoscopy was 6 years ago with Dr. Pan.    Allergies   Allergen Reactions    Acetaminophen Other (See Comments)     hallucinations    Oxycodone-Acetaminophen Other (See Comments)     hallucinations    Atorvastatin Myalgia     High dose    Peanut Oil Diarrhea and Nausea And Vomiting    Dimethicone Rash       Past Medical History:   Diagnosis Date    BPPV (benign paroxysmal positional vertigo) 05/26/2010    CAD (coronary artery disease), native coronary artery     cath (jan 2014): 40% LM, pLAD 85%, dLAD 70%, dLCx 80%, mRCA 95%. s/p CABG x 4 (LIMA->D, SVG->dLAD, Seq SVG -> RPDA & OM2)    Carotid stenosis 01/13/2014    Mod 50-69% DOMINGO stenosis.  Mild 1-49% LICA stenosis.  Occluded right vertebral.       Carpal tunnel syndrome     Cerebrovascular disease     Moderate DOMINGO, mild LICA, occluded R vertebral (Doppler Jan 2014)    Chest pain, musculoskeletal     post CABG    Diabetes (HCC)     Dyslipidemia     H/O tobacco use, presenting hazards to health     quit 2014    Headache(784.0)     cluster    Heart attack (HCC) 2015    Heart attack (HCC) 2014    Hiatal hernia     History of echocardiogram 09/03/2014    Tech difficult.  EF 40-45%.  Mild, diffuse hypk.  Mild LVH.  Indeterminate diastolic fx.  Mild LAE.  No significant valvular heart disease.    Hypercholesterolemia     Hypertension     Hypertensive heart disease with congestive heart failure (HCC)     Internal carotid artery stenosis 5/26/2010    Obesity (BMI 30-39.9)

## 2024-01-22 RX ORDER — GLYBURIDE 5 MG/1
TABLET ORAL
Qty: 90 TABLET | Refills: 1 | Status: SHIPPED | OUTPATIENT
Start: 2024-01-22

## 2024-02-19 ENCOUNTER — TELEPHONE (OUTPATIENT)
Age: 69
End: 2024-02-19

## 2024-02-19 NOTE — TELEPHONE ENCOUNTER
[employer] [phone] - Insurance keeps calling patient because they state they need information from us.     Tried calling 2 times and was put on hold. Had to hang up

## 2024-03-06 ENCOUNTER — TELEPHONE (OUTPATIENT)
Age: 69
End: 2024-03-06

## 2024-03-06 ENCOUNTER — OFFICE VISIT (OUTPATIENT)
Age: 69
End: 2024-03-06
Payer: MEDICARE

## 2024-03-06 VITALS
HEIGHT: 63 IN | WEIGHT: 186 LBS | DIASTOLIC BLOOD PRESSURE: 60 MMHG | SYSTOLIC BLOOD PRESSURE: 118 MMHG | HEART RATE: 85 BPM | OXYGEN SATURATION: 95 % | BODY MASS INDEX: 32.96 KG/M2

## 2024-03-06 DIAGNOSIS — E78.5 HYPERLIPIDEMIA, UNSPECIFIED HYPERLIPIDEMIA TYPE: ICD-10-CM

## 2024-03-06 DIAGNOSIS — Z98.61 CORONARY ANGIOPLASTY STATUS: ICD-10-CM

## 2024-03-06 DIAGNOSIS — I25.10 ATHEROSCLEROSIS OF NATIVE CORONARY ARTERY WITHOUT ANGINA PECTORIS, UNSPECIFIED WHETHER NATIVE OR TRANSPLANTED HEART: Primary | ICD-10-CM

## 2024-03-06 DIAGNOSIS — R07.9 CHEST PAIN, UNSPECIFIED TYPE: ICD-10-CM

## 2024-03-06 PROCEDURE — G8417 CALC BMI ABV UP PARAM F/U: HCPCS | Performed by: INTERNAL MEDICINE

## 2024-03-06 PROCEDURE — 93000 ELECTROCARDIOGRAM COMPLETE: CPT | Performed by: INTERNAL MEDICINE

## 2024-03-06 PROCEDURE — 1123F ACP DISCUSS/DSCN MKR DOCD: CPT | Performed by: INTERNAL MEDICINE

## 2024-03-06 PROCEDURE — 4004F PT TOBACCO SCREEN RCVD TLK: CPT | Performed by: INTERNAL MEDICINE

## 2024-03-06 PROCEDURE — 99214 OFFICE O/P EST MOD 30 MIN: CPT | Performed by: INTERNAL MEDICINE

## 2024-03-06 PROCEDURE — G8484 FLU IMMUNIZE NO ADMIN: HCPCS | Performed by: INTERNAL MEDICINE

## 2024-03-06 PROCEDURE — G8427 DOCREV CUR MEDS BY ELIG CLIN: HCPCS | Performed by: INTERNAL MEDICINE

## 2024-03-06 PROCEDURE — 3017F COLORECTAL CA SCREEN DOC REV: CPT | Performed by: INTERNAL MEDICINE

## 2024-03-06 RX ORDER — SILDENAFIL 25 MG/1
25 TABLET, FILM COATED ORAL PRN
Qty: 15 TABLET | Refills: 2 | Status: SHIPPED | OUTPATIENT
Start: 2024-03-06

## 2024-03-06 ASSESSMENT — PATIENT HEALTH QUESTIONNAIRE - PHQ9
2. FEELING DOWN, DEPRESSED OR HOPELESS: 0
1. LITTLE INTEREST OR PLEASURE IN DOING THINGS: 0
SUM OF ALL RESPONSES TO PHQ QUESTIONS 1-9: 0
SUM OF ALL RESPONSES TO PHQ9 QUESTIONS 1 & 2: 0

## 2024-03-06 NOTE — TELEPHONE ENCOUNTER
Patient called checking status of [employer]. I told the patient that the  tried reaching out to the number that was provided for [employer] and that she could not get through to anyone. We will try to reach out to the insurance company again.

## 2024-03-06 NOTE — PROGRESS NOTES
Rusty Lewis presents today for   Chief Complaint   Patient presents with    Follow-up     Overdue f/u        Rusty Lewis preferred language for health care discussion is english/other.    Is someone accompanying this pt? no    Is the patient using any DME equipment during OV? no    Depression Screening:  Depression: Not at risk (3/6/2024)    PHQ-2     PHQ-2 Score: 0        Learning Assessment:  Who is the primary learner? Patient    What is the preferred language for health care of the primary learner? ENGLISH    How does the primary learner prefer to learn new concepts? DEMONSTRATION    Answered By patient    Relationship to Learner SELF           Pt currently taking Anticoagulant therapy? no    Pt currently taking Antiplatelet therapy ? ASA 81 mg 1x daily and Plavix 75 mg 1x daily       Coordination of Care:  1. Have you been to the ER, urgent care clinic since your last visit? Hospitalized since your last visit? no    2. Have you seen or consulted any other health care providers outside of the [facility] since your last visit? Include any pap smears or colon screening. no    
Head: Normocephalic and atraumatic.   Eyes:      General: No scleral icterus.     Pupils: Pupils are equal, round, and reactive to light.   Cardiovascular:      Rate and Rhythm: Normal rate and regular rhythm.      Heart sounds: Normal heart sounds. No murmur heard.    No friction rub. No gallop.   Pulmonary:      Effort: Pulmonary effort is normal. No respiratory distress.      Breath sounds: Normal breath sounds. No wheezing or rales.   Chest:      Chest wall: No tenderness.   Abdominal:      General: Bowel sounds are normal.      Palpations: Abdomen is soft.   Skin:     General: Skin is warm and dry.      Findings: No rash.   Neurological:      Mental Status: He is alert and oriented to person, place, and time.       EKG today shows: NSR, normal axis and intervals, no ST segment abnormalities    Lab Results   Component Value Date/Time    Cholesterol, total 156 09/16/2021 11:07 AM    HDL Cholesterol 31 (L) 09/16/2021 11:07 AM    LDL, calculated 80 09/16/2021 11:07 AM    LDL, calculated 63 11/05/2019 11:24 AM    VLDL, calculated 45 (H) 09/16/2021 11:07 AM    VLDL, calculated 55 (H) 11/05/2019 11:24 AM    Triglyceride 274 (H) 09/16/2021 11:07 AM    CHOL/HDL Ratio 7.0 (H) 11/27/2015 09:30 AM     Lab Results   Component Value Date/Time    Hemoglobin A1c 7.4 (H) 09/16/2021 11:07 AM    Hemoglobin A1c (POC) 7.1 03/31/2022 09:18 AM     Stress test 2017:  CONCLUSIONS  1. Abnormal and intermediate risk pharmacological nuclear stress test.  2. Small to medium sized, dense, fixed anterolateral perfusion defect  consistent with prior myocardial infarction.  3. No reversible perfusion image abnormalities significant for  myocardial ischemia.  4. Normal left ventricular size, mildly to moderately depressed left  ventricular systolic function, ejection fraction calculated at 40%.    Impression and Plan:  Rusty Lewis is a 66 y.o. with:    1.) CAD s/p CABG 2014, neg nuc 2017  2.) Normal LV function  3.) DM2 with

## 2024-03-22 ENCOUNTER — TELEPHONE (OUTPATIENT)
Age: 69
End: 2024-03-22

## 2024-03-22 NOTE — TELEPHONE ENCOUNTER
Received fax from [employer] requesting verification of patient's chronic conditions. Will review with Dr. Rodríguez when she returns to office.

## 2024-03-22 NOTE — TELEPHONE ENCOUNTER
----- Message from Chanel Penny sent at 3/22/2024  1:30 PM EDT -----  Subject: Message to Provider    QUESTIONS  Information for Provider? Noé called for the patients health insurance   company [employer] stating the patient has complained to [employer] that   the PCP has not completed and sent his chronic condition form. The fax   number for this to be sent is [phone], email is [email or web address].  ---------------------------------------------------------------------------  --------------  CALL BACK INFO  [phone]; OK to leave message on voicemail  ---------------------------------------------------------------------------  --------------  SCRIPT ANSWERS  Relationship to Patient? Covered Entity  Covered Entity Type? Health Insurance?  Representative Name? Noé  
Left messge for Mr. Rusty Lewis to call [facility] back at [phone] option # 3 in reference to [employer] (chronic condition form) in which we have not received.   
NSST

## 2024-03-27 ENCOUNTER — TELEPHONE (OUTPATIENT)
Age: 69
End: 2024-03-27

## 2024-03-27 NOTE — TELEPHONE ENCOUNTER
Spoke with Mr. Rusty Lewis to advise that before completion of Verification of Chronic Condition Forms  can be completed we must obtain a signature from him.  Joshua states he will be here in about an hour for signature.

## 2024-05-06 RX ORDER — LISINOPRIL 5 MG/1
TABLET ORAL
Qty: 90 TABLET | Refills: 3 | Status: SHIPPED | OUTPATIENT
Start: 2024-05-06

## 2024-05-06 RX ORDER — CARVEDILOL 25 MG/1
TABLET ORAL
Qty: 180 TABLET | Refills: 3 | Status: SHIPPED | OUTPATIENT
Start: 2024-05-06

## 2024-05-08 NOTE — PROGRESS NOTES
Chief Complaint   Patient presents with    Medicare AWV      \"Have you been to the ER, urgent care clinic since your last visit?  Hospitalized since your last visit?\"    NO    “Have you seen or consulted any other health care providers outside of [facility] since your last visit?”    NO        “Have you had a colorectal cancer screening such as a colonoscopy/FIT/Cologuard?    NO    Date of last Colonoscopy: 9/7/2017  No cologuard on file  No FIT/FOBT on file   No flexible sigmoidoscopy on file     Annual eye exam: 01/27/2021 - confirmed with [facility]   Pneumococcal vaccine: 09/18/2014  Flu vaccine: 09/18/2014  Patient instructed to remove shoes: due 11/13/2024    Click Here for Release of Records Request

## 2024-05-08 NOTE — PATIENT INSTRUCTIONS
Patient Education        Well Visit, Over 65: Care Instructions  Well visits can help you stay healthy. Your doctor has checked your overall health and may have suggested ways to take good care of yourself. Your doctor also may have recommended tests. You can help prevent illness with healthy eating, good sleep, vaccinations, regular exercise, and other steps.    Get the tests that you and your doctor decide on. Depending on your age and risks, examples might include hearing tests as well as screening for colon, breast, and lung cancer. Screening helps find diseases before any symptoms appear.   Eat healthy foods. Choose fruits, vegetables, whole grains, lean protein, and low-fat dairy foods. Limit saturated fat, and reduce salt.     Limit alcohol. Men should have no more than 2 drinks a day. Women should have no more than 1. For some people, no alcohol is the best choice.   Exercise. It can help prevent falls. Get at least 30 minutes of exercise on most days of the week. Walking, yoga, and matt chi can be good choices.     Reach and stay at your healthy weight. This will lower your risk for many health problems.   Take care of your mental health. Try to stay connected with friends, family, and community, and find ways to manage stress.     If you're feeling depressed or hopeless, talk to someone. A counselor can help. If you don't have a counselor, talk to your doctor.   Talk to your doctor if you think you may have a problem with alcohol or drug use. This includes prescription medicines and illegal drugs.     Avoid tobacco and nicotine: Don't smoke, vape, or chew. If you need help quitting, talk to your doctor.   Practice safer sex. Getting tested, using condoms or dental dams, and limiting sex partners can help prevent STIs.     Make an advance directive. This is a legal way to tell your family and doctor what you want to happen at the end of your life or when you can't speak for yourself.   Prevent problems where

## 2024-05-13 ENCOUNTER — OFFICE VISIT (OUTPATIENT)
Age: 69
End: 2024-05-13
Payer: MEDICARE

## 2024-05-13 VITALS
WEIGHT: 175 LBS | HEART RATE: 72 BPM | HEIGHT: 63 IN | SYSTOLIC BLOOD PRESSURE: 120 MMHG | TEMPERATURE: 98 F | OXYGEN SATURATION: 97 % | RESPIRATION RATE: 16 BRPM | DIASTOLIC BLOOD PRESSURE: 64 MMHG | BODY MASS INDEX: 31.01 KG/M2

## 2024-05-13 VITALS
OXYGEN SATURATION: 97 % | HEIGHT: 63 IN | DIASTOLIC BLOOD PRESSURE: 64 MMHG | WEIGHT: 175 LBS | SYSTOLIC BLOOD PRESSURE: 120 MMHG | TEMPERATURE: 98 F | HEART RATE: 72 BPM | BODY MASS INDEX: 31.01 KG/M2 | RESPIRATION RATE: 16 BRPM

## 2024-05-13 DIAGNOSIS — I50.22 CHRONIC SYSTOLIC (CONGESTIVE) HEART FAILURE (HCC): ICD-10-CM

## 2024-05-13 DIAGNOSIS — I11.0 HYPERTENSIVE HEART DISEASE WITH HEART FAILURE (HCC): ICD-10-CM

## 2024-05-13 DIAGNOSIS — Z12.5 ENCOUNTER FOR SCREENING FOR MALIGNANT NEOPLASM OF PROSTATE: ICD-10-CM

## 2024-05-13 DIAGNOSIS — E66.09 OBESITY DUE TO EXCESS CALORIES WITH SERIOUS COMORBIDITY, UNSPECIFIED CLASSIFICATION: ICD-10-CM

## 2024-05-13 DIAGNOSIS — Z95.1 PRESENCE OF AORTOCORONARY BYPASS GRAFT: ICD-10-CM

## 2024-05-13 DIAGNOSIS — E11.22 TYPE 2 DIABETES MELLITUS WITH CHRONIC KIDNEY DISEASE, WITHOUT LONG-TERM CURRENT USE OF INSULIN, UNSPECIFIED CKD STAGE (HCC): Primary | ICD-10-CM

## 2024-05-13 DIAGNOSIS — Z00.00 MEDICARE ANNUAL WELLNESS VISIT, SUBSEQUENT: Primary | ICD-10-CM

## 2024-05-13 DIAGNOSIS — G47.33 OBSTRUCTIVE SLEEP APNEA (ADULT) (PEDIATRIC): ICD-10-CM

## 2024-05-13 DIAGNOSIS — D12.6 ADENOMATOUS POLYP OF COLON, UNSPECIFIED PART OF COLON: ICD-10-CM

## 2024-05-13 DIAGNOSIS — E78.2 MIXED HYPERLIPIDEMIA: ICD-10-CM

## 2024-05-13 DIAGNOSIS — F17.210 CIGARETTE NICOTINE DEPENDENCE WITHOUT COMPLICATION: ICD-10-CM

## 2024-05-13 DIAGNOSIS — Z71.89 ACP (ADVANCE CARE PLANNING): ICD-10-CM

## 2024-05-13 LAB — HBA1C MFR BLD: 7.5 %

## 2024-05-13 PROCEDURE — G8427 DOCREV CUR MEDS BY ELIG CLIN: HCPCS | Performed by: FAMILY MEDICINE

## 2024-05-13 PROCEDURE — 3046F HEMOGLOBIN A1C LEVEL >9.0%: CPT | Performed by: FAMILY MEDICINE

## 2024-05-13 PROCEDURE — 99497 ADVNCD CARE PLAN 30 MIN: CPT | Performed by: FAMILY MEDICINE

## 2024-05-13 PROCEDURE — 99214 OFFICE O/P EST MOD 30 MIN: CPT | Performed by: FAMILY MEDICINE

## 2024-05-13 PROCEDURE — 83036 HEMOGLOBIN GLYCOSYLATED A1C: CPT | Performed by: FAMILY MEDICINE

## 2024-05-13 PROCEDURE — 1123F ACP DISCUSS/DSCN MKR DOCD: CPT | Performed by: FAMILY MEDICINE

## 2024-05-13 PROCEDURE — 3017F COLORECTAL CA SCREEN DOC REV: CPT | Performed by: FAMILY MEDICINE

## 2024-05-13 PROCEDURE — 4004F PT TOBACCO SCREEN RCVD TLK: CPT | Performed by: FAMILY MEDICINE

## 2024-05-13 PROCEDURE — G8417 CALC BMI ABV UP PARAM F/U: HCPCS | Performed by: FAMILY MEDICINE

## 2024-05-13 PROCEDURE — 2022F DILAT RTA XM EVC RTNOPTHY: CPT | Performed by: FAMILY MEDICINE

## 2024-05-13 PROCEDURE — G0439 PPPS, SUBSEQ VISIT: HCPCS | Performed by: FAMILY MEDICINE

## 2024-05-13 SDOH — ECONOMIC STABILITY: INCOME INSECURITY: HOW HARD IS IT FOR YOU TO PAY FOR THE VERY BASICS LIKE FOOD, HOUSING, MEDICAL CARE, AND HEATING?: PATIENT DECLINED

## 2024-05-13 SDOH — ECONOMIC STABILITY: FOOD INSECURITY: WITHIN THE PAST 12 MONTHS, THE FOOD YOU BOUGHT JUST DIDN'T LAST AND YOU DIDN'T HAVE MONEY TO GET MORE.: PATIENT DECLINED

## 2024-05-13 SDOH — ECONOMIC STABILITY: HOUSING INSECURITY
IN THE LAST 12 MONTHS, WAS THERE A TIME WHEN YOU DID NOT HAVE A STEADY PLACE TO SLEEP OR SLEPT IN A SHELTER (INCLUDING NOW)?: PATIENT DECLINED

## 2024-05-13 SDOH — ECONOMIC STABILITY: FOOD INSECURITY: WITHIN THE PAST 12 MONTHS, YOU WORRIED THAT YOUR FOOD WOULD RUN OUT BEFORE YOU GOT MONEY TO BUY MORE.: PATIENT DECLINED

## 2024-05-13 ASSESSMENT — ANXIETY QUESTIONNAIRES
7. FEELING AFRAID AS IF SOMETHING AWFUL MIGHT HAPPEN: NOT AT ALL
3. WORRYING TOO MUCH ABOUT DIFFERENT THINGS: NOT AT ALL
4. TROUBLE RELAXING: NOT AT ALL
5. BEING SO RESTLESS THAT IT IS HARD TO SIT STILL: NOT AT ALL
1. FEELING NERVOUS, ANXIOUS, OR ON EDGE: NOT AT ALL
GAD7 TOTAL SCORE: 0
6. BECOMING EASILY ANNOYED OR IRRITABLE: NOT AT ALL
2. NOT BEING ABLE TO STOP OR CONTROL WORRYING: NOT AT ALL
IF YOU CHECKED OFF ANY PROBLEMS ON THIS QUESTIONNAIRE, HOW DIFFICULT HAVE THESE PROBLEMS MADE IT FOR YOU TO DO YOUR WORK, TAKE CARE OF THINGS AT HOME, OR GET ALONG WITH OTHER PEOPLE: NOT DIFFICULT AT ALL

## 2024-05-13 ASSESSMENT — PATIENT HEALTH QUESTIONNAIRE - PHQ9
SUM OF ALL RESPONSES TO PHQ QUESTIONS 1-9: 0
2. FEELING DOWN, DEPRESSED OR HOPELESS: NOT AT ALL
SUM OF ALL RESPONSES TO PHQ QUESTIONS 1-9: 0
SUM OF ALL RESPONSES TO PHQ QUESTIONS 1-9: 0
1. LITTLE INTEREST OR PLEASURE IN DOING THINGS: NOT AT ALL
SUM OF ALL RESPONSES TO PHQ QUESTIONS 1-9: 0
SUM OF ALL RESPONSES TO PHQ9 QUESTIONS 1 & 2: 0

## 2024-05-13 ASSESSMENT — LIFESTYLE VARIABLES
HOW OFTEN DO YOU HAVE A DRINK CONTAINING ALCOHOL: NEVER
HOW MANY STANDARD DRINKS CONTAINING ALCOHOL DO YOU HAVE ON A TYPICAL DAY: PATIENT DOES NOT DRINK

## 2024-05-13 NOTE — PROGRESS NOTES
SUBJECTIVE  Chief Complaint   Patient presents with    Hypertension    Diabetes    Congestive Heart Failure    Chronic Kidney Disease    Cholesterol Problem     Here for routine follow-up.      He is taking glyburide.  Not keeping up with diabetic eye exams. Denies any visual changes. Does not report any polyuria or polydipsia.      He is currently doing well from a cardiac standpoint without any cardiac chest pain or dyspnea.  He has been compliant with Plavix.  He is tolerating his statin without myalgias.  He is also on a diuretic.    He has sleep apnea but still cannot wear CPAP because he said it suffocates him.  He does not see a sleep doctor.    OBJECTIVE    Blood pressure 120/64, pulse 72, temperature 98 °F (36.7 °C), temperature source Temporal, resp. rate 16, height 1.6 m (5' 3\"), weight 79.4 kg (175 lb), SpO2 97 %.     General:  alert, cooperative, well appearing, in no apparent distress.   Heart: Normal S1S2, RRR.  Chest: Clear, no w/r/r.  Ext: no swelling.  Neuro:  Monofilament testing is intact.  No motor or sensory deficits.  Foot exam  was performed.   No deformities.  Sensory and motor testing was assessed - intact.  Pedal pulse(s) were assessed - intact.   No pedal lesions.    Monofilament testing is intact.  Mild plantar callusing.   Psych: normal affect.  Mood good.  Oriented x 3.  Judgement and insight intact.   Results for orders placed or performed in visit on 05/13/24   AMB POC HEMOGLOBIN A1C   Result Value Ref Range    Hemoglobin A1C, POC 7.5 %     ASSESSMENT / PLAN   Diagnosis Orders   1. Type 2 diabetes mellitus with chronic kidney disease, without long-term current use of insulin, unspecified CKD stage (HCC)  AMB POC HEMOGLOBIN A1C    CBC with Auto Differential    Comprehensive Metabolic Panel    Lipid Panel    Hemoglobin A1C    Microalbumin / Creatinine Urine Ratio      2. Hypertensive heart disease with heart failure (HCC)  CBC with Auto Differential    Comprehensive Metabolic Panel

## 2024-05-13 NOTE — PROGRESS NOTES
Chief Complaint   Patient presents with    Medicare AWV Medicare Annual Wellness Visit    Rusty Lewis is here for Medicare AW    Assessment & Plan   Medicare annual wellness visit, subsequent  ACP (advance care planning)  -     [place] Advanced Care Planning (16-30 minutes) [52106]  Recommendations for Preventive Services Due: see orders and patient instructions/AVS.  Recommended screening schedule for the next 5-10 years is provided to the patient in written form: see Patient Instructions/AVS.     AWV annually.      Subjective     Patient's complete Health Risk Assessment and screening values have been reviewed and are found in Flowsheets. The following problems were reviewed today and where indicated follow up appointments were made and/or referrals ordered.    Positive Risk Factor Screenings with Interventions:                Activity, Diet, and Weight:  On average, how many days per week do you engage in moderate to strenuous exercise (like a brisk walk)?: 4 days  On average, how many minutes do you engage in exercise at this level?: 30 min    Do you eat balanced/healthy meals regularly?: (!) No    Body mass index is 31 kg/m². (!) Abnormal    Do you eat balanced/healthy meals regularly Interventions:  low carbohydrate diet  Obesity Interventions:  See AVS for additional education material      Dentist Screen:  Have you seen the dentist within the past year?: (!) No  He does have dentures.     Intervention:  Advised to schedule with their dentist     Vision Screen:  Do you have difficulty driving, watching TV, or doing any of your daily activities because of your eyesight?: No  Have you had an eye exam within the past year?: (!) No  No results found.    Interventions:   Patient encouraged to make appointment with their eye specialist      Advanced Directives:  Do you have a Living Will?: (!) No    Intervention:  see ACP note    Advance Care Planning   Discussed the patient’s choices for care and treatment

## 2024-05-13 NOTE — PROGRESS NOTES
Chief Complaint   Patient presents with    Hypertension    Diabetes    Congestive Heart Failure    Chronic Kidney Disease    Cholesterol Problem      \"Have you been to the ER, urgent care clinic since your last visit?  Hospitalized since your last visit?\"    NO    “Have you seen or consulted any other health care providers outside of [facility] since your last visit?”    NO        “Have you had a colorectal cancer screening such as a colonoscopy/FIT/Cologuard?    NO    Date of last Colonoscopy: 9/7/2017  No cologuard on file  No FIT/FOBT on file   No flexible sigmoidoscopy on file     Annual eye exam: 01/27/2021 - confirmed with [facility]   Pneumococcal vaccine: 09/18/2014  Flu vaccine: 09/18/2014  Patient instructed to remove shoes: due 11/13/2024

## 2024-05-13 NOTE — PATIENT INSTRUCTIONS

## 2024-05-13 NOTE — ACP (ADVANCE CARE PLANNING)
illness or accident  Understanding of the healthcare agent role was assessed and information provided    Interventions Provided:  Referral made for ACP follow-up assistance to:  nurse  Recommended review of completed ACP document annually or upon change in health status    Length of Voluntary ACP Conversation in minutes:  16 minutes      Deep Burger MD

## 2024-05-14 ENCOUNTER — CLINICAL DOCUMENTATION (OUTPATIENT)
Dept: SPIRITUAL SERVICES | Age: 69
End: 2024-05-14

## 2024-05-14 NOTE — PROGRESS NOTES
Advance Care Planning   Ambulatory ACP Specialist Patient Outreach    Date:  5/14/2024    ACP Specialist:  Dorys Mcbride    Outreach call to patient in follow-up to ACP Specialist referral from:Deep Burger MD    [x] PCP  [] Provider   [] Ambulatory Care Management [] Other     For:                  [x] Advance Directive Assistance              [] Complete Portable DNR order              [] Complete POST/POLST/MOST              [] Code Status Discussion             [] Discuss Goals of Care             [] Early ACP Decision-Making              [] Other (Specify)    Date Referral Received:5/13/24    Next Step:   [] ACP scheduled conversation  [x] Outreach again in one week               [] Email / Mail ACP Info Sheets  [] Email / Mail Advance Directive   [] Closing referral.  Routing closure to referring provider/staff and to ACP Specialist .    [] Closure letter mailed to patient with invitation to contact ACP Specialist if / when ready.   [] Other (Specify here):         [x] At this time, Healthcare Decision Maker Is:  Advance Care Planning   Healthcare Decision Maker:    Primary Decision Maker: Rusty Lewis Jr. - Child - [phone]    Primary Decision Maker: Nathalie Lewis - Child - [phone]          [] Primary agent named in scanned advance directive.    [x] Legal Next of Kin.     [] Unable to determine legal decision maker at this time.       Outreaches:         [x] 1st -  Date:  5/14/24               Intervention:  [] Spoke with Patient   [x] Left Voice mail [] Email / Mail    [] Naked Wineshart  [] Other (Specify) :     Outcomes:  Outreach phone call to the patient on home phone number which is also listed as the mobile number.  Unable to reach, left voicemail message with call back information.  Will attempt to follow up in one week.           [] 2nd -  Date:                 Intervention:  [] Spoke with Patient  [] Left Voice mail [] Email / Mail    [] MyChart  [] Other (Specify) :

## 2024-05-23 ENCOUNTER — CLINICAL DOCUMENTATION (OUTPATIENT)
Dept: SPIRITUAL SERVICES | Age: 69
End: 2024-05-23

## 2024-05-29 ENCOUNTER — TELEPHONE (OUTPATIENT)
Age: 69
End: 2024-05-29

## 2024-05-29 ENCOUNTER — CLINICAL DOCUMENTATION (OUTPATIENT)
Dept: SPIRITUAL SERVICES | Age: 69
End: 2024-05-29

## 2024-05-29 NOTE — TELEPHONE ENCOUNTER
Received a fax from [facility] requesting cardiac clearance for colonoscopy and to hold aspirin and plavix prior to procedure. Procedure date 7/31/2024. Will discuss with Dr. Rodríguez.

## 2024-05-30 NOTE — TELEPHONE ENCOUNTER
Verbal order and read back per Sakina Rodríguez, DO    Patient can proceed at low to moderate risk from a cardiac standpoint. Ok to hold aspirin and plavix 7 days prior to procedure and resume after.     Clearance form faxed to [phone].

## 2024-06-06 ENCOUNTER — HOSPITAL ENCOUNTER (OUTPATIENT)
Facility: HOSPITAL | Age: 69
Discharge: HOME | End: 2024-06-06
Payer: MEDICARE

## 2024-06-06 DIAGNOSIS — I67.9 CEREBROVASCULAR DISEASE: ICD-10-CM

## 2024-06-06 LAB
ALBUMIN SERPL-MCNC: 3.7 G/DL (ref 3.4–5)
ALBUMIN/GLOB SERPL: 1.2 (ref 0.8–1.7)
ALP SERPL-CCNC: 35 U/L (ref 45–117)
ALT SERPL-CCNC: 19 U/L (ref 16–61)
ANION GAP SERPL CALC-SCNC: 6 MMOL/L (ref 3–18)
AST SERPL-CCNC: 13 U/L (ref 10–38)
BILIRUB SERPL-MCNC: 0.5 MG/DL (ref 0.2–1)
BUN SERPL-MCNC: 29 MG/DL (ref 7–18)
BUN/CREAT SERPL: 19 (ref 12–20)
CALCIUM SERPL-MCNC: 9.1 MG/DL (ref 8.5–10.1)
CHLORIDE SERPL-SCNC: 104 MMOL/L (ref 100–111)
CO2 SERPL-SCNC: 27 MMOL/L (ref 21–32)
CREAT SERPL-MCNC: 1.5 MG/DL (ref 0.6–1.3)
GLOBULIN SER CALC-MCNC: 3 G/DL (ref 2–4)
GLUCOSE SERPL-MCNC: 177 MG/DL (ref 74–99)
INR PPP: 1 (ref 0.9–1.1)
POTASSIUM SERPL-SCNC: 4.3 MMOL/L (ref 3.5–5.5)
PROT SERPL-MCNC: 6.7 G/DL (ref 6.4–8.2)
PROTHROMBIN TIME: 13.1 SEC (ref 11.9–14.9)
SODIUM SERPL-SCNC: 137 MMOL/L (ref 136–145)

## 2024-06-06 PROCEDURE — 36415 COLL VENOUS BLD VENIPUNCTURE: CPT

## 2024-06-06 PROCEDURE — 80053 COMPREHEN METABOLIC PANEL: CPT

## 2024-06-06 PROCEDURE — 85610 PROTHROMBIN TIME: CPT

## 2024-06-06 PROCEDURE — 93005 ELECTROCARDIOGRAM TRACING: CPT

## 2024-06-07 ENCOUNTER — CLINICAL DOCUMENTATION (OUTPATIENT)
Dept: CASE MANAGEMENT | Age: 69
End: 2024-06-07

## 2024-06-07 LAB
EKG ATRIAL RATE: 76 BPM
EKG DIAGNOSIS: NORMAL
EKG P AXIS: 16 DEGREES
EKG P-R INTERVAL: 180 MS
EKG Q-T INTERVAL: 386 MS
EKG QRS DURATION: 96 MS
EKG QTC CALCULATION (BAZETT): 434 MS
EKG R AXIS: 41 DEGREES
EKG T AXIS: 88 DEGREES
EKG VENTRICULAR RATE: 76 BPM

## 2024-06-07 NOTE — ACP (ADVANCE CARE PLANNING)
daughter, Nathalie Lewis, as his share primary health care agent.  Pt was aware that with both of his children being his legal next of kin, that they would be shared primary health care decision makers without a [place] Advance Directive for Health Care document in place.  Pt did not wish to complete a document and has declined any further discussion.  This referral will be closed.    Thank you for this referral.    Alejo Webber, AMANDA, JODI-CP  Advance Care   [facility]  [phone]

## 2024-07-09 ENCOUNTER — TELEPHONE (OUTPATIENT)
Age: 69
End: 2024-07-09

## 2024-07-23 NOTE — PERIOP NOTE
Instructions for your surgery at [facility]      Today's Date:  7/23/2024      Patient's Name:  Rusty Lewis           Surgery Date:  7/31/24              Please enter the main entrance of the hospital and check-in at the front security desk located in the lobby. They will direct you to the area to report for your surgery.     Do NOT eat or drink anything, including candy, gum, or ice chips after midnight prior to your surgery, unless you have specific instructions from your surgeon or anesthesia provider to do so.  Brush your teeth before coming to the hospital. You may swish with water, but do not swallow.  No smoking/Vaping/E-Cigarettes 24 hours prior to the day of surgery.  No alcohol 24 hours prior to the day of surgery.  No recreational drugs for one week prior to the day of surgery.  Bring Photo ID, Insurance information, and Co-pay if required on day of surgery.  Bring in pertinent legal documents, such as, Medical Power of , DNR, Advance Directive, etc.  Leave all valuables, including money/purse, at home.  Remove all jewelry, including ALL body piercings, nail polish, acrylic nails, and makeup (including mascara); no lotions, powders, deodorant, or perfume/cologne/after shave on the skin.  Follow instruction for Hibiclens washes and CHG wipes from surgeon's office.   Glasses and dentures may be worn to the hospital. They must be removed prior to surgery. Please bring case/container for glasses or dentures.   Contact lenses should not be worn on day of surgery.   Call your doctor's office if symptoms of a cold or illness develop within 24-48 hours prior to your surgery.  Call your doctor's office if you have any questions concerning insurance or co-pays.  15. AN ADULT (relative or friend 18 years or older) MUST DRIVE YOU HOME AFTER YOUR SURGERY.  16. Please make arrangements for a responsible adult (18 years or older) to be with you for 24 hours after your surgery.

## 2024-07-30 ENCOUNTER — ANESTHESIA EVENT (OUTPATIENT)
Facility: HOSPITAL | Age: 69
End: 2024-07-30
Payer: MEDICARE

## 2024-07-31 ENCOUNTER — ANESTHESIA (OUTPATIENT)
Facility: HOSPITAL | Age: 69
End: 2024-07-31
Payer: MEDICARE

## 2024-07-31 ENCOUNTER — HOSPITAL ENCOUNTER (OUTPATIENT)
Facility: HOSPITAL | Age: 69
Setting detail: OUTPATIENT SURGERY
Discharge: HOME | End: 2024-07-31
Attending: COLON & RECTAL SURGERY | Admitting: COLON & RECTAL SURGERY
Payer: MEDICARE

## 2024-07-31 VITALS
WEIGHT: 175 LBS | HEIGHT: 63 IN | TEMPERATURE: 97.6 F | SYSTOLIC BLOOD PRESSURE: 131 MMHG | RESPIRATION RATE: 18 BRPM | BODY MASS INDEX: 31.01 KG/M2 | HEART RATE: 68 BPM | DIASTOLIC BLOOD PRESSURE: 62 MMHG | OXYGEN SATURATION: 96 %

## 2024-07-31 LAB — GLUCOSE BLD STRIP.AUTO-MCNC: 114 MG/DL (ref 70–110)

## 2024-07-31 PROCEDURE — 2580000003 HC RX 258: Performed by: NURSE ANESTHETIST, CERTIFIED REGISTERED

## 2024-07-31 PROCEDURE — 82962 GLUCOSE BLOOD TEST: CPT

## 2024-07-31 PROCEDURE — 3700000000 HC ANESTHESIA ATTENDED CARE: Performed by: COLON & RECTAL SURGERY

## 2024-07-31 PROCEDURE — 7100000011 HC PHASE II RECOVERY - ADDTL 15 MIN: Performed by: COLON & RECTAL SURGERY

## 2024-07-31 PROCEDURE — 6360000002 HC RX W HCPCS: Performed by: STUDENT IN AN ORGANIZED HEALTH CARE EDUCATION/TRAINING PROGRAM

## 2024-07-31 PROCEDURE — 3600007502: Performed by: COLON & RECTAL SURGERY

## 2024-07-31 PROCEDURE — 7100000000 HC PACU RECOVERY - FIRST 15 MIN: Performed by: COLON & RECTAL SURGERY

## 2024-07-31 PROCEDURE — 2709999900 HC NON-CHARGEABLE SUPPLY: Performed by: COLON & RECTAL SURGERY

## 2024-07-31 PROCEDURE — 3600007512: Performed by: COLON & RECTAL SURGERY

## 2024-07-31 PROCEDURE — 7100000010 HC PHASE II RECOVERY - FIRST 15 MIN: Performed by: COLON & RECTAL SURGERY

## 2024-07-31 PROCEDURE — 3700000001 HC ADD 15 MINUTES (ANESTHESIA): Performed by: COLON & RECTAL SURGERY

## 2024-07-31 PROCEDURE — 2500000003 HC RX 250 WO HCPCS: Performed by: STUDENT IN AN ORGANIZED HEALTH CARE EDUCATION/TRAINING PROGRAM

## 2024-07-31 PROCEDURE — 88305 TISSUE EXAM BY PATHOLOGIST: CPT

## 2024-07-31 RX ORDER — SODIUM CHLORIDE, SODIUM LACTATE, POTASSIUM CHLORIDE, CALCIUM CHLORIDE 600; 310; 30; 20 MG/100ML; MG/100ML; MG/100ML; MG/100ML
INJECTION, SOLUTION INTRAVENOUS CONTINUOUS
Status: DISCONTINUED | OUTPATIENT
Start: 2024-07-31 | End: 2024-07-31 | Stop reason: HOSPADM

## 2024-07-31 RX ORDER — LIDOCAINE HYDROCHLORIDE 20 MG/ML
INJECTION, SOLUTION EPIDURAL; INFILTRATION; INTRACAUDAL; PERINEURAL PRN
Status: DISCONTINUED | OUTPATIENT
Start: 2024-07-31 | End: 2024-07-31 | Stop reason: SDUPTHER

## 2024-07-31 RX ORDER — LIDOCAINE HYDROCHLORIDE 10 MG/ML
1 INJECTION, SOLUTION EPIDURAL; INFILTRATION; INTRACAUDAL; PERINEURAL
Status: DISCONTINUED | OUTPATIENT
Start: 2024-07-31 | End: 2024-07-31 | Stop reason: HOSPADM

## 2024-07-31 RX ORDER — FAMOTIDINE 20 MG/1
20 TABLET, FILM COATED ORAL ONCE
Status: DISCONTINUED | OUTPATIENT
Start: 2024-07-31 | End: 2024-07-31 | Stop reason: HOSPADM

## 2024-07-31 RX ORDER — PROPOFOL 10 MG/ML
INJECTION, EMULSION INTRAVENOUS PRN
Status: DISCONTINUED | OUTPATIENT
Start: 2024-07-31 | End: 2024-07-31 | Stop reason: SDUPTHER

## 2024-07-31 RX ADMIN — SODIUM CHLORIDE, POTASSIUM CHLORIDE, SODIUM LACTATE AND CALCIUM CHLORIDE: 600; 310; 30; 20 INJECTION, SOLUTION INTRAVENOUS at 09:49

## 2024-07-31 RX ADMIN — PROPOFOL 50 MG: 10 INJECTION, EMULSION INTRAVENOUS at 11:36

## 2024-07-31 RX ADMIN — LIDOCAINE HYDROCHLORIDE 40 MG: 20 INJECTION, SOLUTION EPIDURAL; INFILTRATION; INTRACAUDAL; PERINEURAL at 11:25

## 2024-07-31 RX ADMIN — PROPOFOL 100 MG: 10 INJECTION, EMULSION INTRAVENOUS at 11:25

## 2024-07-31 ASSESSMENT — PAIN - FUNCTIONAL ASSESSMENT
PAIN_FUNCTIONAL_ASSESSMENT: NONE - DENIES PAIN
PAIN_FUNCTIONAL_ASSESSMENT: NONE - DENIES PAIN
PAIN_FUNCTIONAL_ASSESSMENT: 0-10
PAIN_FUNCTIONAL_ASSESSMENT: NONE - DENIES PAIN
PAIN_FUNCTIONAL_ASSESSMENT: NONE - DENIES PAIN

## 2024-07-31 NOTE — OP NOTE
Colonoscopy Procedure Note      Rusty Lewis  1955  [id number]                Date of Procedure: 07/31/24    Preoperative diagnosis: History polyps    Postoperative diagnosis: Polyps of cecum, ascending and transverse colon    :  Dinesh Pan MD    Assistant(s): Circulator: Chalino Stout, CARLOS; Candy Yates, CARLOS    Sedation: MAC    Complications: None    Implants: None    Procedure Details:  Prior to the procedure, a history and physical were performed. The patient’s medications, allergies and sensitivities were reviewed and all questions were answered.  After informed consent was obtained for the procedure, with all risks and benefits of procedure explained. The patient was taken to the endoscopy suite and placed in the left lateral decubitus position.  Patient identification and proposed procedure were verified prior to the procedure by the nurse and I. After sequential anesthesia administered by anesthesiologist, a digital rectal exam was performed and was normal.  The Olympus video colonoscope was introduced through the anus and advanced to cecum, which was identified by the ileocecal valve and appendiceal orifice.   The colonoscope was slowly withdrawn and the mucosa examined for any abnormalities.  Cecal withdrawal time was greater than 6 minutes. The patient tolerated the procedure well. There were no complications.    Bowel Prep Quality: good.     Findings/Interventions:   Polyps - #1, 5 mm in size, located in the cecum, removed by cold biopsy and sent for pathology, - #2, 5 mm in size, located in the cecum, removed by cold snare and retrieved for pathology, - #3, 5 mm in size, located in the ascending colon, removed by snare cautery and retrieved for pathology, - #4, 7 mm in size, located in the ascending colon, removed by snare cautery and retrieved for pathology, - #5, 5 mm in size, located in the ascending colon, removed by snare cautery and retrieved for pathology, - #6, 5 mm in

## 2024-07-31 NOTE — ANESTHESIA POSTPROCEDURE EVALUATION
Department of Anesthesiology  Postprocedure Note    Patient: Rusty Lewis  MRN: [id number]  YOB: 1955  Date of evaluation: 7/31/2024    Procedure Summary       Date: 07/31/24 Room / Location: [facility] ENDO 03 / [facility] ENDOSCOPY    Anesthesia Start: 1118 Anesthesia Stop: 1157    Procedure: COLONOSCOPY with polypectomies (Abdomen) Diagnosis:       Colon cancer screening      (Colon cancer screening [Z12.11])    Surgeons: Dinesh Pan MD Responsible Provider: Tony French MD    Anesthesia Type: General, TIVA ASA Status: 3            Anesthesia Type: General, TIVA    Michell Phase I: Michell Score: 10    Michell Phase II: Michell Score: 10    Anesthesia Post Evaluation    Patient location during evaluation: bedside  Patient participation: complete - patient participated  Level of consciousness: responsive to verbal stimuli  Airway patency: patent  Nausea & Vomiting: no nausea  Respiratory status: acceptable  Hydration status: euvolemic    No notable events documented.

## 2024-07-31 NOTE — H&P
HPI: Rusty Lewis is a 68 y.o. male presenting with chief complain of history of colorectal polyps.    Past Medical History:   Diagnosis Date    BPPV (benign paroxysmal positional vertigo) 05/26/2010    CAD (coronary artery disease), native coronary artery     cath (jan 2014): 40% LM, pLAD 85%, dLAD 70%, dLCx 80%, mRCA 95%. s/p CABG x 4 (LIMA->D, SVG->dLAD, Seq SVG -> RPDA & OM2)    Carotid stenosis 01/13/2014    Mod 50-69% DOMINGO stenosis.  Mild 1-49% LICA stenosis.  Occluded right vertebral.       Carpal tunnel syndrome     Cerebrovascular disease     Moderate DOMINGO, mild LICA, occluded R vertebral (Doppler Jan 2014)    Chest pain, musculoskeletal     post CABG    Diabetes (HCC)     Dyslipidemia     H/O tobacco use, presenting hazards to health     quit 2014    Headache(784.0)     cluster    Heart attack (HCC) 2015    Heart attack ([facility]) 2014    Hiatal hernia     History of echocardiogram 09/03/2014    Tech difficult.  EF 40-45%.  Mild, diffuse hypk.  Mild LVH.  Indeterminate diastolic fx.  Mild LAE.  No significant valvular heart disease.    Hypercholesterolemia     Hypertension     Hypertensive heart disease with congestive heart failure ([facility])     Internal carotid artery stenosis 5/26/2010    Obesity (BMI 30-39.9)     S/P CABG x 4     S/P cardiac catheterization 01/10/2014    pLM 40%.  pLAD 85%.  dCX 80%.  mRCA 95%.  CABG recommended.    S/P colonoscopic polypectomy 09/2017    tubular adenoma, f/u 5 years    Sleep apnea     does not use cpap machine    Stroke (HCC) 5/22/09    TIA (no residuals)    Systolic HF (heart failure) ([facility])     LV EF 35% (echo Jan 2014). Mean LA pressure 32 (Jan 2014)    Vertigo        Past Surgical History:   Procedure Laterality Date    COLONOSCOPY N/A 9/7/2017    COLONOSCOPY with polypectomy performed by Dinesh Pan MD at [facility] ENDOSCOPY    CORONARY ANGIOPLASTY WITH STENT PLACEMENT      CORONARY ARTERY BYPASS GRAFT  1/14/14    LIMA to the D1, and GSV to the distal LAD, and

## 2024-07-31 NOTE — DISCHARGE INSTRUCTIONS
Repeat colonoscopy in 3 year(s).    Restart Aspirin 3 days, Plavix 5 days.      Colonoscopy: What to Expect at Home  Your Recovery  After a colonoscopy, you'll stay at the clinic until you wake up. Then you can go home. But you'll need to arrange for a ride. Your doctor will tell you when you can eat and do your other usual activities.  Your doctor will talk to you about when you'll need your next colonoscopy. Your doctor can help you decide how often you need to be checked. This will depend on the results of your test and your risk for colorectal cancer.  After the test, you may be bloated or have gas pains. You may need to pass gas. If a biopsy was done or a polyp was removed, you may have streaks of blood in your stool (feces) for a few days. Problems such as heavy rectal bleeding may not occur until several weeks after the test. This isn't common. But it can happen after polyps are removed.  This care sheet gives you a general idea about how long it will take for you to recover. But each person recovers at a different pace. Follow the steps below to get better as quickly as possible.  How can you care for yourself at home?  Activity    Rest when you feel tired.     You can do your normal activities when it feels okay to do so.   Diet    Follow your doctor's directions for eating.     Unless your doctor has told you not to, drink plenty of fluids. This helps to replace the fluids that were lost during the colon prep.     Do not drink alcohol.   Medicines    Your doctor will tell you if and when you can restart your medicines. You will also be given instructions about taking any new medicines.     If you take aspirin or some other blood thinner, ask your doctor if and when to start taking it again. Make sure that you understand exactly what your doctor wants you to do.     If polyps were removed or a biopsy was done during the test, your doctor may tell you not to take aspirin or other anti-inflammatory medicines

## 2024-07-31 NOTE — ANESTHESIA PRE PROCEDURE
Anesthesia Evaluation  Patient summary reviewed  Airway: Mallampati: III  TM distance: >3 FB   Neck ROM: limited  Mouth opening: > = 3 FB   Dental:    (+) poor dentition      Pulmonary: breath sounds clear to auscultation  (+)     sleep apnea:                                  Cardiovascular:  Exercise tolerance: good (>4 METS)  (+) hypertension:, past MI: no interval change, CAD:, CABG/stent: no interval change, CHF: no interval change        Rhythm: regular  Rate: normal                    Neuro/Psych:   (+) CVA: no interval change, neuromuscular disease:, headaches:            GI/Hepatic/Renal:   (+) hiatal hernia          Endo/Other:    (+) DiabetesType II DM, well controlled.                 Abdominal:             Vascular:          Other Findings:       Anesthesia Plan      MAC     ASA 3       Induction: intravenous.      Anesthetic plan and risks discussed with patient.                    CESAR PAYNE MD   7/31/2024             diagnosis of leiomyoma of uterus Opioid Counseling: I discussed with the patient the potential side effects of opioids including but not limited to addiction, altered mental status, and depression. I stressed avoiding alcohol, benzodiazepines, muscle relaxants and sleep aids unless specifically okayed by a physician. The patient verbalized understanding of the proper use and possible adverse effects of opioids. All of the patient's questions and concerns were addressed. They were instructed to flush the remaining pills down the toilet if they did not need them for pain.

## 2024-08-22 RX ORDER — GLYBURIDE 5 MG/1
TABLET ORAL
Qty: 90 TABLET | Refills: 0 | Status: SHIPPED | OUTPATIENT
Start: 2024-08-22

## 2024-08-22 RX ORDER — GLYBURIDE 5 MG/1
TABLET ORAL
Qty: 90 TABLET | Refills: 1 | OUTPATIENT
Start: 2024-08-22

## 2024-08-22 RX ORDER — GLYBURIDE 5 MG/1
TABLET ORAL
Qty: 90 TABLET | Refills: 1 | Status: CANCELLED | OUTPATIENT
Start: 2024-08-22

## 2024-08-22 NOTE — TELEPHONE ENCOUNTER
This pharmacy faxed over request for the following prescriptions to be filled:    Medication requested :   glyBURIDE (DIABETA) 5 MG tablet QTY 90    furosemide (LASIX) 40 MG tablet QTY 90  clopidogrel (PLAVIX) 75 MG tablet  QTY 90  PCP: Jennyfer  Pharmacy or Print: [facility]  Mail order or Local pharmacy [address]    Scheduled appointment if not seen by current providers in office: LOV 5/13/24 f/u 11/19/2024

## 2024-08-27 RX ORDER — CLOPIDOGREL BISULFATE 75 MG/1
75 TABLET ORAL DAILY
Qty: 90 TABLET | Refills: 3 | Status: SHIPPED | OUTPATIENT
Start: 2024-08-27

## 2024-08-27 RX ORDER — LISINOPRIL 5 MG/1
5 TABLET ORAL DAILY
Qty: 90 TABLET | Refills: 3 | Status: SHIPPED | OUTPATIENT
Start: 2024-08-27

## 2024-08-27 RX ORDER — ROSUVASTATIN CALCIUM 20 MG/1
20 TABLET, COATED ORAL DAILY
Qty: 90 TABLET | Refills: 3 | Status: SHIPPED | OUTPATIENT
Start: 2024-08-27

## 2024-08-27 RX ORDER — POTASSIUM CHLORIDE 750 MG/1
10 TABLET, EXTENDED RELEASE ORAL DAILY
Qty: 90 TABLET | Refills: 3 | Status: SHIPPED | OUTPATIENT
Start: 2024-08-27

## 2024-08-27 RX ORDER — FUROSEMIDE 40 MG
40 TABLET ORAL DAILY
Qty: 90 TABLET | Refills: 3 | Status: SHIPPED | OUTPATIENT
Start: 2024-08-27

## 2024-08-27 RX ORDER — CARVEDILOL 25 MG/1
25 TABLET ORAL 2 TIMES DAILY WITH MEALS
Qty: 180 TABLET | Refills: 3 | Status: SHIPPED | OUTPATIENT
Start: 2024-08-27

## 2024-08-27 NOTE — TELEPHONE ENCOUNTER
----- Message from Zamzam FONSECA sent at 8/27/2024  9:59 AM EDT -----  Pt called stating he is out or nearly out of all his meds.    [facility] [address] [place] [address] - P [phone] - F [phone]    Please advise - [phone]

## 2024-08-28 ENCOUNTER — TELEPHONE (OUTPATIENT)
Age: 69
End: 2024-08-28

## 2024-08-28 NOTE — TELEPHONE ENCOUNTER
Patient called stating that his pharmacy has changed to the [facility] on [facility]. Updated his chart to reflect this change.

## 2024-11-19 ENCOUNTER — OFFICE VISIT (OUTPATIENT)
Facility: CLINIC | Age: 69
End: 2024-11-19

## 2024-11-19 VITALS
HEIGHT: 63 IN | TEMPERATURE: 98.6 F | HEART RATE: 65 BPM | OXYGEN SATURATION: 97 % | DIASTOLIC BLOOD PRESSURE: 70 MMHG | BODY MASS INDEX: 31.36 KG/M2 | WEIGHT: 177 LBS | SYSTOLIC BLOOD PRESSURE: 132 MMHG | RESPIRATION RATE: 18 BRPM

## 2024-11-19 DIAGNOSIS — G89.29 CHRONIC PAIN OF LEFT ANKLE: ICD-10-CM

## 2024-11-19 DIAGNOSIS — D12.6 ADENOMATOUS POLYP OF COLON, UNSPECIFIED PART OF COLON: ICD-10-CM

## 2024-11-19 DIAGNOSIS — E66.09 CLASS 1 OBESITY DUE TO EXCESS CALORIES WITH SERIOUS COMORBIDITY AND BODY MASS INDEX (BMI) OF 31.0 TO 31.9 IN ADULT: ICD-10-CM

## 2024-11-19 DIAGNOSIS — Z91.199 MEDICALLY NONCOMPLIANT: ICD-10-CM

## 2024-11-19 DIAGNOSIS — Z95.1 PRESENCE OF AORTOCORONARY BYPASS GRAFT: ICD-10-CM

## 2024-11-19 DIAGNOSIS — F17.210 CIGARETTE NICOTINE DEPENDENCE WITHOUT COMPLICATION: ICD-10-CM

## 2024-11-19 DIAGNOSIS — G47.33 OBSTRUCTIVE SLEEP APNEA (ADULT) (PEDIATRIC): ICD-10-CM

## 2024-11-19 DIAGNOSIS — E78.2 MIXED HYPERLIPIDEMIA: ICD-10-CM

## 2024-11-19 DIAGNOSIS — E11.22 TYPE 2 DIABETES MELLITUS WITH CHRONIC KIDNEY DISEASE, WITHOUT LONG-TERM CURRENT USE OF INSULIN, UNSPECIFIED CKD STAGE (HCC): Primary | ICD-10-CM

## 2024-11-19 DIAGNOSIS — I50.22 CHRONIC SYSTOLIC (CONGESTIVE) HEART FAILURE (HCC): ICD-10-CM

## 2024-11-19 DIAGNOSIS — E66.811 CLASS 1 OBESITY DUE TO EXCESS CALORIES WITH SERIOUS COMORBIDITY AND BODY MASS INDEX (BMI) OF 31.0 TO 31.9 IN ADULT: ICD-10-CM

## 2024-11-19 DIAGNOSIS — M25.572 CHRONIC PAIN OF LEFT ANKLE: ICD-10-CM

## 2024-11-19 DIAGNOSIS — I11.0 HYPERTENSIVE HEART DISEASE WITH HEART FAILURE (HCC): ICD-10-CM

## 2024-11-19 ASSESSMENT — PATIENT HEALTH QUESTIONNAIRE - PHQ9
2. FEELING DOWN, DEPRESSED OR HOPELESS: NOT AT ALL
SUM OF ALL RESPONSES TO PHQ QUESTIONS 1-9: 0
1. LITTLE INTEREST OR PLEASURE IN DOING THINGS: NOT AT ALL
SUM OF ALL RESPONSES TO PHQ QUESTIONS 1-9: 0
SUM OF ALL RESPONSES TO PHQ9 QUESTIONS 1 & 2: 0

## 2024-11-19 NOTE — PROGRESS NOTES
Chief Complaint   Patient presents with    Cholesterol Problem    Chronic Kidney Disease    Congestive Heart Failure    Diabetes    Hypertension    Ankle Pain     C/O left ankle pain due to fall     Urine Concerns     C/O intermittently foamy urine and interrupted flow          \"Have you been to the ER, urgent care clinic since your last visit?  Hospitalized since your last visit?\"    NO    “Have you seen or consulted any other health care providers outside our system since your last visit?”    NO    Annual eye exam: 05/31/2024  Pneumococcal vaccine: 09/18/2014  Flu vaccine: 09/18/2014  Patient instructed to remove shoes: Yes    Declined annual influenza vaccine for [id number]    No updated immunization noted on [place] Immunization Registry as of 11/18/2024       
(pediatric)        7. Cigarette nicotine dependence without complication        8. Adenomatous polyp of colon, unspecified part of colon        9. Chronic pain of left ankle        10. Medically noncompliant          Labs unable to be reviewed due to noncompliance.    Diabetes with CKD -  A1c not done yet, overdue.  Cont glyburide 5mg daily.  Advised regular foot checks and annual eye exams.   Enc annual eye exams.    CAD with bypass graft present / chronic CHF (compensated) / hypertensive heart disease / dyslipidemia - Controlled and under care of cardiology.  Latest notes reviewed. Continue current care.      Obesity - Diabetic dieting.  Cont activity as tolerated.     Sleep apnea - enc that he follows with a sleep specialist.    Smoker - advised on smoking cessation.     Adenomatous polyp - inquiring about follow-up recommendations.     Chronic left ankle pain  - offered treatment or eval options versus watching for now.  He wishes to just watch and come back in if persists.     Declines AAA testing / vaccination.     All chart history elements were reviewed by me at the time of the visit even though marked at time of note closure. Patient understands our medical plan. Patient has provided input and agrees with goals. Alternatives have been explained and offered.  All questions answered.  The patient is to call if condition worsens or fails to improve.     Return in about 6 months (around 5/19/2025) for annual medicare wellness / routine care, fasting labs to be completed within the next 1-3 days .

## 2024-11-19 NOTE — PATIENT INSTRUCTIONS
ounces or less each day. One egg counts as 1 ounce.  Eat 4 to 5 servings of nuts, seeds, and legumes (cooked dried beans, lentils, and split peas) each week. A serving is 1/3 cup of nuts, 2 tablespoons of seeds, 2 tablespoons of peanut butter, or 1/2 cup of cooked beans or peas.  Limit fats and oils to 2 to 3 servings each day. A serving is 1 teaspoon of vegetable oil or 2 tablespoons of salad dressing.  Limit sweets and added sugars to 5 servings or less a week. A serving is 1 tablespoon jelly or jam, 1/2 cup sorbet, or 1 cup of lemonade.  Eat less than 2,300 milligrams (mg) of sodium a day. If you limit your sodium to 1,500 mg a day, you can lower your blood pressure even more.  Be aware that all of these are the suggested number of servings for people who eat 1,800 to 2,000 calories a day. Your recommended number of servings may be different if you need more or fewer calories.  Tips for success  Start small. Make small changes, and stick with them. Once those changes become habit, add a few more changes.  Try some of the following:  Make it a goal to eat a fruit or vegetable at every meal and at snacks. This will make it easy to get the recommended amount of fruits and vegetables each day.  Try yogurt topped with fruit and nuts for a snack or healthy dessert.  Add lettuce, tomato, cucumber, and onion to sandwiches.  Have a variety of cut-up vegetables with a low-fat dip as an appetizer instead of chips and dip.  Sprinkle sunflower seeds or chopped almonds over salads. Or try adding chopped walnuts or almonds to cooked vegetables.  Try some vegetarian meals using beans and peas. Add garbanzo or kidney beans to salads. Make burritos and tacos with mashed barreto beans or black beans.  Where can you learn more?  Go to https://www.healthwise.net/patientEd and enter H967 to learn more about \"DASH Diet: Care Instructions.\"  Current as of: September 20, 2023  Content Version: 14.2  © 2024 [facility], [facility].   Care

## 2024-11-27 RX ORDER — POTASSIUM CHLORIDE 750 MG/1
10 TABLET, EXTENDED RELEASE ORAL DAILY
Qty: 90 TABLET | Refills: 3 | Status: SHIPPED | OUTPATIENT
Start: 2024-11-27

## 2024-11-27 RX ORDER — GLYBURIDE 5 MG/1
TABLET ORAL
Qty: 90 TABLET | Refills: 0 | OUTPATIENT
Start: 2024-11-27

## 2024-11-27 RX ORDER — ROSUVASTATIN CALCIUM 20 MG/1
20 TABLET, COATED ORAL DAILY
Qty: 90 TABLET | Refills: 3 | Status: SHIPPED | OUTPATIENT
Start: 2024-11-27

## 2024-11-27 NOTE — TELEPHONE ENCOUNTER
Was supposed to do his labs after his visit.  He mentioned at his visit at which he forgot to get labs that he was sorry and that he never misses his labs and that he would get them done in the next few days.  I will refill this safely once I can properly evaluate his diabetes and kidneys/liver.  Please advise him to get labs and then request a refill at that time.

## 2024-12-03 LAB
ALBUMIN SERPL-MCNC: 4.2 G/DL (ref 3.9–4.9)
ALBUMIN/CREAT UR: 17 MG/G CREAT (ref 0–29)
ALP SERPL-CCNC: 36 IU/L (ref 44–121)
ALT SERPL-CCNC: 14 IU/L (ref 0–44)
AST SERPL-CCNC: 18 IU/L (ref 0–40)
BASOPHILS # BLD AUTO: 0.1 X10E3/UL (ref 0–0.2)
BASOPHILS NFR BLD AUTO: 1 %
BILIRUB SERPL-MCNC: 0.4 MG/DL (ref 0–1.2)
BUN SERPL-MCNC: 22 MG/DL (ref 8–27)
BUN/CREAT SERPL: 16 (ref 10–24)
CALCIUM SERPL-MCNC: 9 MG/DL (ref 8.6–10.2)
CHLORIDE SERPL-SCNC: 106 MMOL/L (ref 96–106)
CHOLEST SERPL-MCNC: 128 MG/DL (ref 100–199)
CO2 SERPL-SCNC: 23 MMOL/L (ref 20–29)
CREAT SERPL-MCNC: 1.37 MG/DL (ref 0.76–1.27)
CREAT UR-MCNC: 126.2 MG/DL
EGFRCR SERPLBLD CKD-EPI 2021: 56 ML/MIN/1.73
EOSINOPHIL # BLD AUTO: 0.3 X10E3/UL (ref 0–0.4)
EOSINOPHIL NFR BLD AUTO: 4 %
ERYTHROCYTE [DISTWIDTH] IN BLOOD BY AUTOMATED COUNT: 13.1 % (ref 11.6–15.4)
GLOBULIN SER CALC-MCNC: 2.3 G/DL (ref 1.5–4.5)
GLUCOSE SERPL-MCNC: 136 MG/DL (ref 70–99)
HBA1C MFR BLD: 7.5 % (ref 4.8–5.6)
HCT VFR BLD AUTO: 43.5 % (ref 37.5–51)
HDLC SERPL-MCNC: 29 MG/DL
HGB BLD-MCNC: 13.5 G/DL (ref 13–17.7)
IMM GRANULOCYTES # BLD AUTO: 0.1 X10E3/UL (ref 0–0.1)
IMM GRANULOCYTES NFR BLD AUTO: 1 %
IMP & REVIEW OF LAB RESULTS: NORMAL
LDLC SERPL CALC-MCNC: 62 MG/DL (ref 0–99)
LYMPHOCYTES # BLD AUTO: 2.4 X10E3/UL (ref 0.7–3.1)
LYMPHOCYTES NFR BLD AUTO: 27 %
Lab: NORMAL
MCH RBC QN AUTO: 27.4 PG (ref 26.6–33)
MCHC RBC AUTO-ENTMCNC: 31 G/DL (ref 31.5–35.7)
MCV RBC AUTO: 88 FL (ref 79–97)
MICROALBUMIN UR-MCNC: 21.7 UG/ML
MONOCYTES # BLD AUTO: 0.9 X10E3/UL (ref 0.1–0.9)
MONOCYTES NFR BLD AUTO: 10 %
NEUTROPHILS # BLD AUTO: 5.3 X10E3/UL (ref 1.4–7)
NEUTROPHILS NFR BLD AUTO: 57 %
PLATELET # BLD AUTO: 192 X10E3/UL (ref 150–450)
POTASSIUM SERPL-SCNC: 4.8 MMOL/L (ref 3.5–5.2)
PROT SERPL-MCNC: 6.5 G/DL (ref 6–8.5)
PSA SERPL-MCNC: 1.8 NG/ML (ref 0–4)
RBC # BLD AUTO: 4.93 X10E6/UL (ref 4.14–5.8)
REPORT: NORMAL
REPORT: NORMAL
SODIUM SERPL-SCNC: 142 MMOL/L (ref 134–144)
SPECIMEN STATUS REPORT: NORMAL
TRIGL SERPL-MCNC: 227 MG/DL (ref 0–149)
VLDLC SERPL CALC-MCNC: 37 MG/DL (ref 5–40)
WBC # BLD AUTO: 9.1 X10E3/UL (ref 3.4–10.8)

## 2024-12-03 RX ORDER — ROSUVASTATIN CALCIUM 20 MG/1
20 TABLET, COATED ORAL DAILY
Qty: 90 TABLET | Refills: 3 | Status: SHIPPED | OUTPATIENT
Start: 2024-12-03

## 2024-12-03 RX ORDER — GLYBURIDE 5 MG/1
TABLET ORAL
Qty: 90 TABLET | Refills: 0 | Status: SHIPPED | OUTPATIENT
Start: 2024-12-03

## 2024-12-03 RX ORDER — CLOPIDOGREL BISULFATE 75 MG/1
75 TABLET ORAL DAILY
Qty: 90 TABLET | Refills: 3 | Status: SHIPPED | OUTPATIENT
Start: 2024-12-03

## 2024-12-03 RX ORDER — POTASSIUM CHLORIDE 750 MG/1
10 TABLET, EXTENDED RELEASE ORAL DAILY
Qty: 90 TABLET | Refills: 3 | Status: SHIPPED | OUTPATIENT
Start: 2024-12-03

## 2024-12-03 RX ORDER — FUROSEMIDE 40 MG/1
40 TABLET ORAL DAILY
Qty: 90 TABLET | Refills: 3 | Status: SHIPPED | OUTPATIENT
Start: 2024-12-03

## 2024-12-03 RX ORDER — CARVEDILOL 25 MG/1
25 TABLET ORAL 2 TIMES DAILY WITH MEALS
Qty: 180 TABLET | Refills: 3 | Status: SHIPPED | OUTPATIENT
Start: 2024-12-03

## 2024-12-03 NOTE — TELEPHONE ENCOUNTER
LOV 11/19/2024   F/U 05/20/2025     Patient had labs done yesterday. Just wants Dr. Burger to know the reason it took so long is he is caring for his elderly father. He apologized for the delay.

## 2024-12-06 NOTE — TELEPHONE ENCOUNTER
Internal Medicine Progress Note         SUBJECTIVE     - Pt seen at bedside, TAYLEREO  - The patient was seen at bed, irritable and not willing to take to the interviewer.    - Denies chest pain, SOB, n/v/d/c, fevers, chills     OBJECTIVE       Vitals:    Vital Last Value 24 Hour Range   Temperature 98.8 °F (37.1 °C) (12/06/24 0423) Temp  Min: 97.7 °F (36.5 °C)  Max: 98.8 °F (37.1 °C)   Pulse 83 (12/06/24 0746) Pulse  Min: 73  Max: 92   Respiratory 16 (12/06/24 0423) Resp  Min: 16  Max: 16   Non-Invasive  Blood Pressure 94/56 (12/06/24 0746) BP  Min: 94/56  Max: 105/68   Pulse Oximetry 100 % (12/06/24 0746) SpO2  Min: 98 %  Max: 100 %   Arterial   Blood Pressure   No data recorded      I/O last 3 completed shifts:  In: 654 [P.O.:460; IV Piggyback:194]  Out: 600 [Urine:600]  No intake/output data recorded.    Physical Exam:  Physical Exam  Constitutional:       Appearance: Normal appearance. She is normal weight.   Cardiovascular:      Rate and Rhythm: Normal rate and regular rhythm.      Pulses: Normal pulses.      Heart sounds: Normal heart sounds.   Pulmonary:      Effort: Pulmonary effort is normal.      Breath sounds: Normal breath sounds.   Abdominal:      General: Abdomen is flat. Bowel sounds are normal.      Palpations: Abdomen is soft.   Neurological:      Mental Status: She is alert.          Diagnostic data:  Recent Results (from the past 48 hour(s))   GLUCOSE, BEDSIDE - POINT OF CARE    Collection Time: 12/04/24 11:07 AM    Specimen: Blood   Result Value Ref Range    GLUCOSE, BEDSIDE - POINT OF CARE 214 (H) 70 - 99 mg/dL   Potassium    Collection Time: 12/04/24  1:44 PM    Specimen: Blood, Venous   Result Value Ref Range    Potassium 3.4 3.4 - 5.1 mmol/L   GLUCOSE, BEDSIDE - POINT OF CARE    Collection Time: 12/04/24  4:28 PM    Specimen: Blood   Result Value Ref Range    GLUCOSE, BEDSIDE - POINT OF CARE 126 (H) 70 - 99 mg/dL   GLUCOSE, BEDSIDE - POINT OF CARE    Collection Time: 12/04/24  8:09 PM     We will refill his meds when we see him and get back on track. I will order nonfasting labs for him to do prior. Specimen: Blood   Result Value Ref Range    GLUCOSE, BEDSIDE - POINT OF CARE 132 (H) 70 - 99 mg/dL   Potassium    Collection Time: 12/04/24  8:44 PM    Specimen: Blood, Venous   Result Value Ref Range    Potassium 4.1 3.4 - 5.1 mmol/L   Comprehensive Metabolic Panel    Collection Time: 12/05/24  4:47 AM    Specimen: Blood, Venous   Result Value Ref Range    Fasting Status      Sodium 145 135 - 145 mmol/L    Potassium 4.0 3.4 - 5.1 mmol/L    Chloride 114 (H) 97 - 110 mmol/L    Carbon Dioxide 24 21 - 32 mmol/L    Anion Gap 11 7 - 19 mmol/L    Glucose 111 (H) 70 - 99 mg/dL    BUN 14 6 - 20 mg/dL    Creatinine 0.75 0.51 - 0.95 mg/dL    Glomerular Filtration Rate 79 >=60    BUN/Cr 19 7 - 25    Calcium 8.6 8.4 - 10.2 mg/dL    Bilirubin, Total 0.4 0.2 - 1.0 mg/dL    GOT/AST 53 (H) <=37 Units/L    GPT/ALT 40 <64 Units/L    Alkaline Phosphatase 100 45 - 117 Units/L    Albumin 1.4 (L) 3.4 - 5.0 g/dL    Protein, Total 4.9 (L) 6.4 - 8.2 g/dL    Globulin 3.5 2.0 - 4.0 g/dL    A/G Ratio 0.4 (L) 1.0 - 2.4   Phosphorus    Collection Time: 12/05/24  4:47 AM    Specimen: Blood, Venous   Result Value Ref Range    Phosphorus 1.3 (L) 2.4 - 4.7 mg/dL   CBC No Differential    Collection Time: 12/05/24  4:47 AM    Specimen: Blood, Venous   Result Value Ref Range    WBC 7.6 4.2 - 11.0 K/mcL    RBC 3.29 (L) 4.00 - 5.20 mil/mcL    HGB 8.5 (L) 12.0 - 15.5 g/dL    HCT 25.6 (L) 36.0 - 46.5 %    MCV 77.8 (L) 78.0 - 100.0 fl    MCH 25.8 (L) 26.0 - 34.0 pg    MCHC 33.2 32.0 - 36.5 g/dL     140 - 450 K/mcL    RDW-CV 19.0 (H) 11.0 - 15.0 %    RDW-SD 52.2 (H) 39.0 - 50.0 fL    NRBC 0 <=0 /100 WBC   Reticulocyte Count Automated    Collection Time: 12/05/24  4:47 AM    Specimen: Blood, Venous   Result Value Ref Range    Retic ABS 55 10 - 120 K/mcL    Immature Retic Frac 15.5 1.5 - 16.0 %    Retic Count 1.7 0.3 - 2.5 %    Reticulocyte Hemoglobin Content 24.4 (L) 28.6 - 36.3 pg   GLUCOSE, BEDSIDE - POINT OF CARE    Collection Time: 12/05/24  7:42  AM    Specimen: Blood   Result Value Ref Range    GLUCOSE, BEDSIDE - POINT OF CARE 118 (H) 70 - 99 mg/dL   GLUCOSE, BEDSIDE - POINT OF CARE    Collection Time: 12/05/24 11:36 AM    Specimen: Blood   Result Value Ref Range    GLUCOSE, BEDSIDE - POINT OF CARE 142 (H) 70 - 99 mg/dL   Phosphorus    Collection Time: 12/05/24  3:13 PM    Specimen: Blood, Venous   Result Value Ref Range    Phosphorus 3.8 2.4 - 4.7 mg/dL   Potassium    Collection Time: 12/05/24  3:13 PM    Specimen: Blood, Venous   Result Value Ref Range    Potassium 3.9 3.4 - 5.1 mmol/L   Sodium    Collection Time: 12/05/24  3:13 PM    Specimen: Blood, Venous   Result Value Ref Range    Sodium 144 135 - 145 mmol/L   GLUCOSE, BEDSIDE - POINT OF CARE    Collection Time: 12/05/24  4:14 PM    Specimen: Blood   Result Value Ref Range    GLUCOSE, BEDSIDE - POINT OF CARE 191 (H) 70 - 99 mg/dL   GLUCOSE, BEDSIDE - POINT OF CARE    Collection Time: 12/05/24  8:24 PM    Specimen: Blood   Result Value Ref Range    GLUCOSE, BEDSIDE - POINT OF CARE 233 (H) 70 - 99 mg/dL   GLUCOSE, BEDSIDE - POINT OF CARE    Collection Time: 12/05/24 11:23 PM    Specimen: Blood   Result Value Ref Range    GLUCOSE, BEDSIDE - POINT OF CARE 201 (H) 70 - 99 mg/dL   Magnesium    Collection Time: 12/06/24  4:37 AM    Specimen: Blood, Venous   Result Value Ref Range    Magnesium 1.5 (L) 1.7 - 2.4 mg/dL   Basic Metabolic Panel    Collection Time: 12/06/24  4:37 AM    Specimen: Blood, Venous   Result Value Ref Range    Fasting Status      Sodium 147 (H) 135 - 145 mmol/L    Potassium 3.3 (L) 3.4 - 5.1 mmol/L    Chloride 115 (H) 97 - 110 mmol/L    Carbon Dioxide 24 21 - 32 mmol/L    Anion Gap 11 7 - 19 mmol/L    Glucose 136 (H) 70 - 99 mg/dL    BUN 14 6 - 20 mg/dL    Creatinine 0.74 0.51 - 0.95 mg/dL    Glomerular Filtration Rate 81 >=60    BUN/Cr 19 7 - 25    Calcium 8.3 (L) 8.4 - 10.2 mg/dL   CBC No Differential    Collection Time: 12/06/24  4:37 AM    Specimen: Blood, Venous   Result Value Ref  Range    WBC 5.7 4.2 - 11.0 K/mcL    RBC 3.16 (L) 4.00 - 5.20 mil/mcL    HGB 8.2 (L) 12.0 - 15.5 g/dL    HCT 26.6 (L) 36.0 - 46.5 %    MCV 84.2 78.0 - 100.0 fl    MCH 25.9 (L) 26.0 - 34.0 pg    MCHC 30.8 (L) 32.0 - 36.5 g/dL     140 - 450 K/mcL    RDW-CV 20.0 (H) 11.0 - 15.0 %    RDW-SD 58.7 (H) 39.0 - 50.0 fL    NRBC 0 <=0 /100 WBC   GLUCOSE, BEDSIDE - POINT OF CARE    Collection Time: 12/06/24  7:27 AM    Specimen: Blood   Result Value Ref Range    GLUCOSE, BEDSIDE - POINT OF CARE 141 (H) 70 - 99 mg/dL     ?  Studies:  No results found.    Cardiac studies:   Encounter Date: 11/30/24   Electrocardiogram 12-Lead   Result Value    Ventricular Rate EKG/Min (BPM) 106    Atrial Rate (BPM) 107    QRS-Interval (MSEC) 154    QT-Interval (MSEC) 424    QTc 563    R Axis (Degrees) -41    T Axis (Degrees) 179    REPORT TEXT      Ventricular-paced rhythm  Biventricular pacemaker detected  Abnormal ECG  When compared with ECG of  30-NOV-2024 23:37,  No significant change was found  Confirmed by CHERYL SOSA MD ([id number]) on 12/2/2024 7:57:00 AM              ASSESSMENT AND PLAN   82 year old female with a PMH of paroxysmal atrial fibrillation on Apixaban, complete heart block now on biv PM (since 2021), HLD, HTN, HFrEF, CKD stage III, T2DM on insulin h/o stage I breast cancer, dementia who presented to the MICU from floors due to septic shock requiring pressors and severe diarrhea. Patient initially admitted to the floors for unwitnessed fall for workup.      #Diarrhea  #Hypovolemic shock(resolved)  - Patient received enema during this admission  - Patient had multiple episodes of diarrhea yesterday night and 5-6 episodes today. RRT was called yesterday for hypotension but picked up with fluids. Today another RRT called for MAPs reaching 40s despite fluids requiring Levophed to .  - GI Panel negative , C-diff negative, Stool Bacterial culture negative, Shiga toxin negative, Campylobacter negative, Fecal calprotectin  132, +WBC in stool  - Stable numerous cystic lesions throughout the pancreatic body and tail.  Stable pancreatic ductal dilation measuring up to 9 mm in the pancreatic  body. 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 If it has not already  been performed, pre and postcontrast MRI abdomen pancreatic protocol is  recommended for further evaluation.  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 If it  has not already been performed, a follow-up with cystoscopy is recommended  - Pt requiring Levophed (12/01-12/02)  Plan:  - Can consider Urology consult or follow up outpatient   - PT/OT recs     #Septic shock due to UTI (resolved)  - Blood cultures and urine cultures currently negative   - WBC 8.0 > 17.6>9.4>7.5>7.6 ; LA 2.3 > 2.9 >4.1>4.1>2.3>3.8>2.2>2.3>3.7>2.5, CPK: 278   - UA: Glucose > 1000, Blood large, Lekocyte esterase Large, Leukocytes > Bacteria Large   - Recent history of  admission due to sepsis due to UTI   - No fever , No urinary complaints   - urine cultures-NGTD   - s/p vancomycin and zosyn on 12/1   - patient requiring levophed (12/2/2024)  Plan:  -Off antibiotics will continue to monitor     # Sacral wound, stage II   - wound team was consulted      # HFpEF (EF 64%)  # Paroxysmal atrial fibrillation on Apixaban  # Complete heart block s/p biv PM  # HTN  # HLD  - TTE (march 2022): EF 45 percent   - TTE 12/02/24: EF 64% systolic function normal   - Pacemaker first implanted in 2017 and upgraded in 2021 to biv with new RA lead due to fractured lead  - PTA Apixaban was restarted after patient was cleared by trauma   - Lactic acid 2.3>3.7>2.5  - EKG: Ventricular-paced rhythm   - TTE: EF 64%  - s/p 1.5 L fluids and 1 u pRBC on 12/3  Plan:  - Patient has now improved EF. Will continue Toprol-XL today.   - Will restart Spironolactone tomorrow, and consider restarting Entresto and Jardiance tomorrow if BP  allows.   - Continue PTA Eliquis 5 mg BID, aspirin and atorvastatin 80 mg  - goal for MAP above 65      #Unwitnessed fall   - patient found fallen in her Nursing home  - when asked , PT unable to recall why or how she fell   - fracture on RLE 2 weeks ago secondary to a fall and has a cast on the RLE   - CT Head showing Stable mild patchy areas of subcortical and periventricular hypoattenuation likely representing chronic microvascular ischemic changes. With Remote right cerebellar infarct. 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 as noted above     #PMH of breast cancer   -Continue Anastrozole 1mg oral daily      #Microcytic anemia  -Fe 32 (low), TIBC 33 (Low), Ferritin (481)     #DM2   - hba1c 11/30: 5.4 percent   - patient on LDISS  - Hold PTA PO metformin and basal insulin given initial hypoglycemia when admitted in ed and recent h/o hypoglycemia on admission 2 weeks ago prior to this admission       #Hypokalemia  #Hypomagnesemia   - K 3.3>3.5 on 12/06/24  - MG 1.5 on 12/06/24  Plan:   - Will replace with K chlor packet and magnesium oxide x2      Fluids: none  Electrolytes: replete prn  Nutrition:  Consistent Carb  VTE/GI Ppx: Eliquis    CODE STATUS: Full     DISPO: Back to nursing home    D/w: Attending Physician Dr. Collier    What Matters Most       Henri Mills MD  PGY-1, [facility]  Please contact through [employer]

## 2025-02-26 RX ORDER — GLYBURIDE 5 MG/1
TABLET ORAL
Qty: 90 TABLET | Refills: 0 | Status: SHIPPED | OUTPATIENT
Start: 2025-02-26

## 2025-04-10 ENCOUNTER — OFFICE VISIT (OUTPATIENT)
Age: 70
End: 2025-04-10
Payer: MEDICARE

## 2025-04-10 VITALS
BODY MASS INDEX: 32.07 KG/M2 | HEIGHT: 63 IN | WEIGHT: 181 LBS | SYSTOLIC BLOOD PRESSURE: 124 MMHG | DIASTOLIC BLOOD PRESSURE: 68 MMHG | OXYGEN SATURATION: 94 % | HEART RATE: 68 BPM

## 2025-04-10 DIAGNOSIS — R07.9 CHEST PAIN, UNSPECIFIED TYPE: ICD-10-CM

## 2025-04-10 DIAGNOSIS — I25.10 ATHEROSCLEROSIS OF NATIVE CORONARY ARTERY WITHOUT ANGINA PECTORIS, UNSPECIFIED WHETHER NATIVE OR TRANSPLANTED HEART: Primary | ICD-10-CM

## 2025-04-10 DIAGNOSIS — Z98.61 CORONARY ANGIOPLASTY STATUS: ICD-10-CM

## 2025-04-10 DIAGNOSIS — E78.5 HYPERLIPIDEMIA, UNSPECIFIED HYPERLIPIDEMIA TYPE: ICD-10-CM

## 2025-04-10 PROCEDURE — G8417 CALC BMI ABV UP PARAM F/U: HCPCS | Performed by: INTERNAL MEDICINE

## 2025-04-10 PROCEDURE — 4004F PT TOBACCO SCREEN RCVD TLK: CPT | Performed by: INTERNAL MEDICINE

## 2025-04-10 PROCEDURE — 99214 OFFICE O/P EST MOD 30 MIN: CPT | Performed by: INTERNAL MEDICINE

## 2025-04-10 PROCEDURE — 3017F COLORECTAL CA SCREEN DOC REV: CPT | Performed by: INTERNAL MEDICINE

## 2025-04-10 PROCEDURE — 1126F AMNT PAIN NOTED NONE PRSNT: CPT | Performed by: INTERNAL MEDICINE

## 2025-04-10 PROCEDURE — 1123F ACP DISCUSS/DSCN MKR DOCD: CPT | Performed by: INTERNAL MEDICINE

## 2025-04-10 PROCEDURE — 93000 ELECTROCARDIOGRAM COMPLETE: CPT | Performed by: INTERNAL MEDICINE

## 2025-04-10 PROCEDURE — G8428 CUR MEDS NOT DOCUMENT: HCPCS | Performed by: INTERNAL MEDICINE

## 2025-04-10 NOTE — PROGRESS NOTES
Rusty Lewis presents today for   Chief Complaint   Patient presents with    Follow-up     1 year f/u with no concerns        Rusty Lewis preferred language for health care discussion is english/other.    Is someone accompanying this pt? no    Is the patient using any DME equipment during OV? no    Depression Screening:  Depression: Not at risk (11/19/2024)    PHQ-2     PHQ-2 Score: 0        Learning Assessment:  Who is the primary learner? Patient    What is the preferred language for health care of the primary learner? ENGLISH    How does the primary learner prefer to learn new concepts? DEMONSTRATION    Answered By patient    Relationship to Learner SELF           Pt currently taking Anticoagulant therapy? no    Pt currently taking Antiplatelet therapy ? no      Coordination of Care:  1. Have you been to the ER, urgent care clinic since your last visit? Hospitalized since your last visit? no    2. Have you seen or consulted any other health care providers outside of the [facility] since your last visit? Include any pap smears or colon screening. no    
continued DAPT  3.) Please talk to your PCP about ED treatment/ eval/ workup  4.) RTC with me yearly     Thank you for allowing me to participate in the care of your patient, please do not hesitate to call with questions or concerns.    Regards,    Sakina Rodríguez, DO

## 2025-05-19 NOTE — TELEPHONE ENCOUNTER
PCP: Deep Burger MD    Last appt:  Visit date not found   Future Appointments   Date Time Provider Department Center   6/24/2025  9:30 AM Deep Burger MD [facility]   4/15/2026  8:40 AM Sakina Rodríguez, DO [facility]       Requested Prescriptions     Pending Prescriptions Disp Refills    lisinopril (PRINIVIL;ZESTRIL) 5 MG tablet [Pharmacy Med Name: Lisinopril 5 MG Oral Tablet] 90 tablet 0     Sig: Take 1 tablet by mouth once daily       Request for a 30 or 90 day supply? Provider Discretion    Pharmacy: [address] - [address]    Other Comments:    Per the last office note:    \"Cont med therapy, but can reduce Lasix to PRN (start with every other day, and only take KCL when you take Lasix)\"

## 2025-05-20 RX ORDER — LISINOPRIL 5 MG/1
5 TABLET ORAL DAILY
Qty: 90 TABLET | Refills: 3 | Status: SHIPPED | OUTPATIENT
Start: 2025-05-20

## 2025-06-23 NOTE — PROGRESS NOTES
Chief Complaint   Patient presents with    Medicare AWV       Have you been to the ER, urgent care clinic since your last visit?  Hospitalized since your last visit?   NO    Have you seen or consulted any other health care providers outside our system since your last visit?   NO      “Have you had a diabetic eye exam?”    NO     Date of last diabetic eye exam: 5/31/2024     Annual eye exam: 05/31/2024  Pneumococcal vaccine: 09/18/2014  Flu vaccine: 09/18/2014  Patient instructed to remove shoes: due 11/19/2025

## 2025-06-23 NOTE — PATIENT INSTRUCTIONS

## 2025-06-24 ENCOUNTER — OFFICE VISIT (OUTPATIENT)
Facility: CLINIC | Age: 70
End: 2025-06-24
Payer: MEDICARE

## 2025-06-24 VITALS
OXYGEN SATURATION: 97 % | BODY MASS INDEX: 31.18 KG/M2 | RESPIRATION RATE: 16 BRPM | WEIGHT: 176 LBS | HEIGHT: 63 IN | RESPIRATION RATE: 16 BRPM | WEIGHT: 176 LBS | HEART RATE: 83 BPM | DIASTOLIC BLOOD PRESSURE: 70 MMHG | HEART RATE: 83 BPM | TEMPERATURE: 98.6 F | HEIGHT: 63 IN | DIASTOLIC BLOOD PRESSURE: 70 MMHG | SYSTOLIC BLOOD PRESSURE: 126 MMHG | TEMPERATURE: 98.6 F | BODY MASS INDEX: 31.18 KG/M2 | SYSTOLIC BLOOD PRESSURE: 126 MMHG | OXYGEN SATURATION: 97 %

## 2025-06-24 DIAGNOSIS — I50.22 CHRONIC SYSTOLIC (CONGESTIVE) HEART FAILURE (HCC): ICD-10-CM

## 2025-06-24 DIAGNOSIS — Z71.89 ACP (ADVANCE CARE PLANNING): ICD-10-CM

## 2025-06-24 DIAGNOSIS — D12.6 ADENOMATOUS POLYP OF COLON, UNSPECIFIED PART OF COLON: ICD-10-CM

## 2025-06-24 DIAGNOSIS — Z95.1 PRESENCE OF AORTOCORONARY BYPASS GRAFT: ICD-10-CM

## 2025-06-24 DIAGNOSIS — E11.22 TYPE 2 DIABETES MELLITUS WITH CHRONIC KIDNEY DISEASE, WITHOUT LONG-TERM CURRENT USE OF INSULIN, UNSPECIFIED CKD STAGE (HCC): Primary | ICD-10-CM

## 2025-06-24 DIAGNOSIS — Z12.5 ENCOUNTER FOR SCREENING FOR MALIGNANT NEOPLASM OF PROSTATE: ICD-10-CM

## 2025-06-24 DIAGNOSIS — E66.09 CLASS 1 OBESITY DUE TO EXCESS CALORIES WITH SERIOUS COMORBIDITY AND BODY MASS INDEX (BMI) OF 31.0 TO 31.9 IN ADULT: ICD-10-CM

## 2025-06-24 DIAGNOSIS — E66.811 CLASS 1 OBESITY DUE TO EXCESS CALORIES WITH SERIOUS COMORBIDITY AND BODY MASS INDEX (BMI) OF 31.0 TO 31.9 IN ADULT: ICD-10-CM

## 2025-06-24 DIAGNOSIS — G47.33 OBSTRUCTIVE SLEEP APNEA (ADULT) (PEDIATRIC): ICD-10-CM

## 2025-06-24 DIAGNOSIS — E78.2 MIXED HYPERLIPIDEMIA: ICD-10-CM

## 2025-06-24 DIAGNOSIS — F17.210 CIGARETTE NICOTINE DEPENDENCE WITHOUT COMPLICATION: ICD-10-CM

## 2025-06-24 DIAGNOSIS — Z00.00 MEDICARE ANNUAL WELLNESS VISIT, SUBSEQUENT: Primary | ICD-10-CM

## 2025-06-24 DIAGNOSIS — I11.0 HYPERTENSIVE HEART DISEASE WITH HEART FAILURE (HCC): ICD-10-CM

## 2025-06-24 LAB — HBA1C MFR BLD: 7.5 %

## 2025-06-24 PROCEDURE — G8427 DOCREV CUR MEDS BY ELIG CLIN: HCPCS | Performed by: FAMILY MEDICINE

## 2025-06-24 PROCEDURE — G0439 PPPS, SUBSEQ VISIT: HCPCS | Performed by: FAMILY MEDICINE

## 2025-06-24 PROCEDURE — 3017F COLORECTAL CA SCREEN DOC REV: CPT | Performed by: FAMILY MEDICINE

## 2025-06-24 PROCEDURE — G8417 CALC BMI ABV UP PARAM F/U: HCPCS | Performed by: FAMILY MEDICINE

## 2025-06-24 PROCEDURE — 1159F MED LIST DOCD IN RCRD: CPT | Performed by: FAMILY MEDICINE

## 2025-06-24 PROCEDURE — 2022F DILAT RTA XM EVC RTNOPTHY: CPT | Performed by: FAMILY MEDICINE

## 2025-06-24 PROCEDURE — 1160F RVW MEDS BY RX/DR IN RCRD: CPT | Performed by: FAMILY MEDICINE

## 2025-06-24 PROCEDURE — 99497 ADVNCD CARE PLAN 30 MIN: CPT | Performed by: FAMILY MEDICINE

## 2025-06-24 PROCEDURE — 1123F ACP DISCUSS/DSCN MKR DOCD: CPT | Performed by: FAMILY MEDICINE

## 2025-06-24 PROCEDURE — 99214 OFFICE O/P EST MOD 30 MIN: CPT | Performed by: FAMILY MEDICINE

## 2025-06-24 PROCEDURE — 1126F AMNT PAIN NOTED NONE PRSNT: CPT | Performed by: FAMILY MEDICINE

## 2025-06-24 PROCEDURE — 3046F HEMOGLOBIN A1C LEVEL >9.0%: CPT | Performed by: FAMILY MEDICINE

## 2025-06-24 PROCEDURE — 83036 HEMOGLOBIN GLYCOSYLATED A1C: CPT | Performed by: FAMILY MEDICINE

## 2025-06-24 PROCEDURE — 3051F HG A1C>EQUAL 7.0%<8.0%: CPT | Performed by: FAMILY MEDICINE

## 2025-06-24 PROCEDURE — 4004F PT TOBACCO SCREEN RCVD TLK: CPT | Performed by: FAMILY MEDICINE

## 2025-06-24 RX ORDER — GLYBURIDE 5 MG/1
TABLET ORAL
Qty: 90 TABLET | Refills: 1 | Status: SHIPPED | OUTPATIENT
Start: 2025-06-24

## 2025-06-24 RX ORDER — GLYBURIDE 5 MG/1
TABLET ORAL
Qty: 90 TABLET | Refills: 1 | Status: CANCELLED | OUTPATIENT
Start: 2025-06-24

## 2025-06-24 SDOH — ECONOMIC STABILITY: FOOD INSECURITY: WITHIN THE PAST 12 MONTHS, THE FOOD YOU BOUGHT JUST DIDN'T LAST AND YOU DIDN'T HAVE MONEY TO GET MORE.: PATIENT DECLINED

## 2025-06-24 SDOH — ECONOMIC STABILITY: FOOD INSECURITY: WITHIN THE PAST 12 MONTHS, YOU WORRIED THAT YOUR FOOD WOULD RUN OUT BEFORE YOU GOT MONEY TO BUY MORE.: PATIENT DECLINED

## 2025-06-24 ASSESSMENT — PATIENT HEALTH QUESTIONNAIRE - PHQ9
SUM OF ALL RESPONSES TO PHQ QUESTIONS 1-9: 0
1. LITTLE INTEREST OR PLEASURE IN DOING THINGS: NOT AT ALL
SUM OF ALL RESPONSES TO PHQ QUESTIONS 1-9: 0
SUM OF ALL RESPONSES TO PHQ QUESTIONS 1-9: 0
2. FEELING DOWN, DEPRESSED OR HOPELESS: NOT AT ALL
SUM OF ALL RESPONSES TO PHQ QUESTIONS 1-9: 0

## 2025-06-24 NOTE — ACP (ADVANCE CARE PLANNING)
Chief Complaint   Patient presents with    Medicare AWV     Advance Care Planning       Advance Care Planning (ACP) Physician/NP/PA (Provider) Conversation        Date of ACP Conversation: 6/24/2025    Conversation Conducted with:   Patient with Decision Making Capacity    Health Care Decision Maker:      Primary Decision Maker: Rusty Lewis Jr. - Child - [phone]    Primary Decision Maker: Nathalie Lewis - Child - [phone]      Care Preferences:    Hospitalization:  \"If your health worsens and it becomes clear that your chance of recovery is unlikely, what would your preference be regarding hospitalization?\"    Unsure      Ventilation:  \"If you were in your present state of health and suddenly became very ill and were unable to breathe on your own, what would your preference be about the use of a ventilator (breathing machine) if it was available to you?\"      Would want if prognosis was likely to recover but otherwise not wanted if poor prognosis.    \"If your health worsens and it becomes clear that your chance of recovery is unlikely, what would your preference be about the use of a ventilator (breathing machine) if it was available to you?\"     No      Resuscitation:  \"CPR works best to restart the heart when there is a sudden event, like a heart attack, in someone who is otherwise healthy. Unfortunately, CPR does not typically restart the heart for people who have serious health conditions or who are very sick.\"    \"In the event your heart stopped as a result of an underlying serious health condition, would you want attempts to be made to restart your heart (answer \"yes\" for attempt to resuscitate) or would you prefer a natural death (answer \"no\" for do not attempt to resuscitate)?\"     Would want CPR unless poor prognosis.    General ACP for ALL Patients with Decision Making Capacity:   Importance of advance care planning, including choosing a healthcare agent to communicate patient's healthcare

## 2025-06-24 NOTE — PATIENT INSTRUCTIONS
insulin-to-carb ratio. You and your diabetes health professional will figure out the ratio. You can do this by testing your blood sugar after meals. For example, you may need a certain amount of insulin for every 15 grams of carbs.  Add up the carb grams in a meal. Then you can figure out how many units of insulin to take based on your insulin-to-carb ratio.  Exercise lowers blood sugar. You can use less insulin than you would if you were not doing exercise. Keep in mind that timing matters. If you exercise within 1 hour after a meal, your body may need less insulin for that meal than it would if you exercised 3 hours after the meal. Test your blood sugar to find out how exercise affects your need for insulin.  If you do or don't take insulin:  Look at labels on packaged foods. This can tell you how many carbs are in a serving.  Be aware of portions, or serving sizes. If a package has two servings and you eat the whole package, you need to double the number of grams of carbohydrate listed for one serving.  Protein, fat, and fiber do not raise blood sugar as much as carbs do. If you eat a lot of these nutrients in a meal, your blood sugar will rise more slowly than it would otherwise.  Where can you learn more?  Go to https://www.[facility].net/patientEd and enter G703 to learn more about \"Counting Carbohydrates for Diabetes: Care Instructions.\"  Current as of: April 30, 2024  Content Version: 14.5  © 9561-2746 [employer].   Care instructions adapted under license by [employer]. If you have questions about a medical condition or this instruction, always ask your healthcare professional. [employer], disclaims any warranty or liability for your use of this information.

## 2025-06-24 NOTE — PROGRESS NOTES
Chief Complaint   Patient presents with    Hypertension    Hyperlipidemia    Diabetes      Have you been to the ER, urgent care clinic since your last visit?  Hospitalized since your last visit?   NO    Have you seen or consulted any other health care providers outside our system since your last visit?   NO      “Have you had a diabetic eye exam?”    NO     Date of last diabetic eye exam: 5/31/2024     Annual eye exam: 05/31/2024  Pneumococcal vaccine: 09/18/2014  Flu vaccine: 09/18/2014  Patient instructed to remove shoes: due 11/19/2025

## 2025-06-24 NOTE — PROGRESS NOTES
Chief Complaint   Patient presents with    Medicare AWV Medicare Annual Wellness Visit    Rusty Lewis is here for Medicare AW    Assessment & Plan   Medicare annual wellness visit, subsequent  ACP (advance care planning)  -     [place] Advanced Care Planning (16-30 minutes) [60746]     ACP discussed.  AWV annually.       Subjective       Patient's complete Health Risk Assessment and screening values have been reviewed and are found in Flowsheets. The following problems were reviewed today and where indicated follow up appointments were made and/or referrals ordered.    Positive Risk Factor Screenings with Interventions:                Abnormal BMI (obese):  Body mass index is 31.18 kg/m². (!) Abnormal  Interventions:  See AVS for additional education material      Dentist Screen:  Have you seen the dentist within the past year?: (!) No    Intervention:  Not applicable - dentures     Vision Screen:  Do you have difficulty driving, watching TV, or doing any of your daily activities because of your eyesight?: No  Have you had an eye exam within the past year?: (!) No  Interventions:   Patient encouraged to make appointment with their eye specialist      Advanced Directives:  Do you have a Living Will?: (!) No    Intervention:  see ACP note        Tobacco Use:    Tobacco Use      Smoking status: Some Days        Packs/day: 0.00        Types: Cigarettes        Last attempt to quit: 2014        Years since quittin.4      Smokeless tobacco: Never     Interventions:  Advised to quit smoking            Objective   Vitals:    25 0935   BP: 126/70   BP Site: Left Upper Arm   Patient Position: Sitting   BP Cuff Size: Large Adult   Pulse: 83   Resp: 16   Temp: 98.6 °F (37 °C)   TempSrc: Skin   SpO2: 97%   Weight: 79.8 kg (176 lb)   Height: 1.6 m (5' 3\")      Body mass index is 31.18 kg/m².                 Allergies   Allergen Reactions    Acetaminophen Other (See Comments)     hallucinations

## 2025-06-24 NOTE — PROGRESS NOTES
SUBJECTIVE  Chief Complaint   Patient presents with    Hypertension    Hyperlipidemia    Diabetes     Here for routine follow-up.      Says that he hurt his left ankle, folded under a mower several weeks back.  Getting better but still painful along the peroneal tendon distribution.      He is taking glyburide. Has had more sugars in his diet admittedly.  Does not report any polyuria or polydipsia.      He is currently doing well from a cardiac standpoint without any cardiac chest pain or dyspnea.  He has been compliant with Plavix.  He is tolerating his statin without myalgias.  He is also on a diuretic.    He has sleep apnea but still cannot wear CPAP because he said it suffocates him.  He does not see a sleep doctor.    OBJECTIVE    Blood pressure 126/70, pulse 83, temperature 98.6 °F (37 °C), temperature source Skin, resp. rate 16, height 1.6 m (5' 3\"), weight 79.8 kg (176 lb), SpO2 97%.     General:  alert, cooperative, well appearing, in no apparent distress.   Heart: Normal S1S2, RRR.  Chest: Clear, no w/r/r.  Ext: no swelling.  Foot exam  was performed.   No deformities.  Sensory and motor testing was assessed - intact.  Pedal pulse(s) were assessed - intact.   No pedal lesions.    Monofilament testing is intact.  Mild plantar callusing.   Psych: normal affect.  Mood good.  Oriented x 3.  Judgement and insight intact.     Results for orders placed or performed in visit on 06/24/25   AMB POC HEMOGLOBIN A1C   Result Value Ref Range    Hemoglobin A1C, POC 7.5 %     ASSESSMENT / PLAN   Diagnosis Orders   1. Type 2 diabetes mellitus with chronic kidney disease, without long-term current use of insulin, unspecified CKD stage (HCC)  AMB POC HEMOGLOBIN A1C    CBC with Auto Differential    Comprehensive Metabolic Panel    Lipid Panel    Albumin/Creatinine Ratio, Urine    Hemoglobin A1C      2. Hypertensive heart disease with heart failure (HCC)  CBC with Auto Differential    Comprehensive Metabolic Panel    Lipid

## (undated) DEVICE — STERILE POLYISOPRENE POWDER-FREE SURGICAL GLOVES: Brand: PROTEXIS

## (undated) DEVICE — 4-PORT MANIFOLD: Brand: NEPTUNE 2

## (undated) DEVICE — (D)SYR 10ML 1/5ML GRAD NSAF -- PKGING CHANGE USE ITEM 338027

## (undated) DEVICE — GAUZE SPONGES,16 PLY: Brand: CURITY

## (undated) DEVICE — MEDI-VAC SUCTION HIGH CAPACITY: Brand: CARDINAL HEALTH

## (undated) DEVICE — INTENDED FOR TISSUE SEPARATION, AND OTHER PROCEDURES THAT REQUIRE A SHARP SURGICAL BLADE TO PUNCTURE OR CUT.: Brand: BARD-PARKER SAFETY BLADES SIZE 10, STERILE

## (undated) DEVICE — SNARE POLYP M W27MMXL240CM OVL STIFF DISP CAPTIVATOR

## (undated) DEVICE — SYRINGE MED 10ML LUERLOCK TIP W/O SFTY DISP

## (undated) DEVICE — CATHETER THOR 36FR DIA10.7MM POLYVI CHL TRCR TIP STR SFT

## (undated) DEVICE — SOLUTION IRRIG 1000ML H2O STRL BLT

## (undated) DEVICE — ENDOSCOPY PUMP TUBING/ CAP SET: Brand: ERBE

## (undated) DEVICE — AIRLIFE™ NASAL OXYGEN CANNULA CURVED, NONFLARED TIP WITH 14 FOOT (4.3 M) CRUSH-RESISTANT TUBING, OVER-THE-EAR STYLE: Brand: AIRLIFE™

## (undated) DEVICE — SUTURE PROL SZ 2-0 L30IN NONABSORBABLE BLU L36MM FSLX 3/8 8689H

## (undated) DEVICE — FLUFF AND POLYMER UNDERPAD,EXTRA HEAVY: Brand: WINGS

## (undated) DEVICE — CATHETER SUCT TR FL TIP 14FR W/ O CTRL

## (undated) DEVICE — ELECTRODE ES AD DISPER HYDRGEL THN FOAM ADH SCALLOPED EDGE

## (undated) DEVICE — PACK PROCEDURE SURG MAJ W/ BASIN LF

## (undated) DEVICE — KIT CLN UP BON SECOURS MARYV

## (undated) DEVICE — GARMENT,MEDLINE,DVT,SEQUENTIAL,CALF,MD: Brand: MEDLINE

## (undated) DEVICE — REM POLYHESIVE ADULT PATIENT RETURN ELECTRODE: Brand: VALLEYLAB

## (undated) DEVICE — SUTURE VCRL SZ 3-0 L27IN ABSRB UD L36MM CT-1 1/2 CIR J258H

## (undated) DEVICE — CANNULA ORIG TL CLR W FOAM CUSHIONS AND 14FT SUPL TB 3 CHN

## (undated) DEVICE — SMOKE EVACUATION PENCIL: Brand: VALLEYLAB

## (undated) DEVICE — SHEET, DRAPE, SPLIT, STERILE: Brand: MEDLINE

## (undated) DEVICE — (D)GLOVE EXAM LG NITRL NS -- DISC BY MFR NO SUB

## (undated) DEVICE — SUTURE ETHLN SZ 2-0 L30IN NONABSORBABLE BLK L36MM PSLX 3/8 1697H

## (undated) DEVICE — GAUZE,SPONGE,4"X4",16PLY,STRL,LF,10/TRAY: Brand: MEDLINE

## (undated) DEVICE — DRAPE TOWEL: Brand: CONVERTORS

## (undated) DEVICE — MEDI-VAC NON-CONDUCTIVE SUCTION TUBING: Brand: CARDINAL HEALTH

## (undated) DEVICE — UNDERPAD INCONT W23XL36IN STD BLU POLYPR BK FLUF SFT

## (undated) DEVICE — TRAP SPEC POLYP REM STRNR CLN DSGN MAGNIFYING WIND DISP

## (undated) DEVICE — GOWN ISOL IMPERV UNIV, DISP, OPEN BACK, BLUE --

## (undated) DEVICE — FLEX ADVANTAGE 3000CC: Brand: FLEX ADVANTAGE

## (undated) DEVICE — FORCEPS BX L240CM JAW DIA2.8MM L CAP W/ NDL MIC MESH TOOTH

## (undated) DEVICE — GLOVE SURG SZ 8 L11.77IN FNGR THK9.8MIL STRW LTX POLYMER

## (undated) DEVICE — CANNULA NSL AD TBNG L14FT STD PVC O2 CRV CONN NONFLARED NSL

## (undated) DEVICE — BLANKET WRM AD W50XL85.8IN PACU FULL BODY FORC AIR

## (undated) DEVICE — GOWN PLASTIC FILM THMBHKS UNIV BLUE: Brand: CARDINAL HEALTH

## (undated) DEVICE — SYRINGE MED 25GA 3ML L5/8IN SUBQ PLAS W/ DETACH NDL SFTY

## (undated) DEVICE — SYR 50ML SLIP TIP NSAF LF STRL --

## (undated) DEVICE — SUTURE VCRL SZ 3-0 L27IN ABSRB UD L26MM SH 1/2 CIR J416H

## (undated) DEVICE — SYRINGE MED 50ML LUERSLIP TIP

## (undated) DEVICE — SYRINGE 20ML LL S/C 50

## (undated) DEVICE — LINER SUCT CANSTR 3000CC PLAS SFT PRE ASSEMB W/OUT TBNG W/

## (undated) DEVICE — YANKAUER,SMOOTH HANDLE,HIGH CAPACITY: Brand: MEDLINE INDUSTRIES, INC.

## (undated) DEVICE — ELECTRODE PT RET AD L9FT HI MOIST COND ADH HYDRGEL CORDED

## (undated) DEVICE — SYRINGE MED 20ML STD CLR PLAS LUERLOCK TIP N CTRL DISP

## (undated) DEVICE — DRAPE,TOP,102X53,STERILE: Brand: MEDLINE